# Patient Record
Sex: MALE | Race: WHITE | Employment: OTHER | URBAN - METROPOLITAN AREA
[De-identification: names, ages, dates, MRNs, and addresses within clinical notes are randomized per-mention and may not be internally consistent; named-entity substitution may affect disease eponyms.]

---

## 2017-02-27 RX ORDER — LISINOPRIL 20 MG/1
TABLET ORAL
Qty: 60 TABLET | Refills: 4 | Status: SHIPPED | OUTPATIENT
Start: 2017-02-27 | End: 2017-08-19 | Stop reason: SDUPTHER

## 2017-04-06 RX ORDER — ATORVASTATIN CALCIUM 40 MG/1
TABLET, FILM COATED ORAL
Qty: 30 TABLET | Refills: 5 | Status: SHIPPED | OUTPATIENT
Start: 2017-04-06 | End: 2017-10-21 | Stop reason: SDUPTHER

## 2017-04-06 RX ORDER — METOPROLOL TARTRATE 50 MG/1
TABLET, FILM COATED ORAL
Qty: 60 TABLET | Refills: 5 | Status: SHIPPED | OUTPATIENT
Start: 2017-04-06 | End: 2017-10-21 | Stop reason: SDUPTHER

## 2017-05-25 ENCOUNTER — TELEPHONE (OUTPATIENT)
Dept: CARDIOLOGY CLINIC | Age: 67
End: 2017-05-25

## 2017-07-11 ENCOUNTER — HOSPITAL ENCOUNTER (OUTPATIENT)
Dept: NON INVASIVE DIAGNOSTICS | Age: 67
Discharge: HOME | End: 2017-07-11
Attending: INTERNAL MEDICINE | Admitting: INTERNAL MEDICINE

## 2017-07-11 DIAGNOSIS — I35.1 NONRHEUMATIC AORTIC VALVE INSUFFICIENCY: ICD-10-CM

## 2017-07-11 LAB
LV EF: 48 %
LVEF MODALITY: NORMAL

## 2017-07-24 ENCOUNTER — OFFICE VISIT (OUTPATIENT)
Dept: CARDIOLOGY CLINIC | Age: 67
End: 2017-07-24

## 2017-07-24 VITALS
SYSTOLIC BLOOD PRESSURE: 130 MMHG | DIASTOLIC BLOOD PRESSURE: 80 MMHG | OXYGEN SATURATION: 98 % | HEART RATE: 62 BPM | HEIGHT: 69 IN | WEIGHT: 184 LBS | BODY MASS INDEX: 27.25 KG/M2

## 2017-07-24 DIAGNOSIS — E78.5 HYPERLIPIDEMIA, UNSPECIFIED HYPERLIPIDEMIA TYPE: ICD-10-CM

## 2017-07-24 DIAGNOSIS — I35.1 NONRHEUMATIC AORTIC VALVE INSUFFICIENCY: ICD-10-CM

## 2017-07-24 DIAGNOSIS — I25.10 CAD, MULTIPLE VESSEL: Primary | ICD-10-CM

## 2017-07-24 DIAGNOSIS — I10 ESSENTIAL HYPERTENSION: ICD-10-CM

## 2017-07-24 DIAGNOSIS — D68.00 VON WILLEBRAND DISEASE: ICD-10-CM

## 2017-07-24 PROCEDURE — 4040F PNEUMOC VAC/ADMIN/RCVD: CPT | Performed by: NURSE PRACTITIONER

## 2017-07-24 PROCEDURE — G8419 CALC BMI OUT NRM PARAM NOF/U: HCPCS | Performed by: NURSE PRACTITIONER

## 2017-07-24 PROCEDURE — G8427 DOCREV CUR MEDS BY ELIG CLIN: HCPCS | Performed by: NURSE PRACTITIONER

## 2017-07-24 PROCEDURE — 1123F ACP DISCUSS/DSCN MKR DOCD: CPT | Performed by: NURSE PRACTITIONER

## 2017-07-24 PROCEDURE — G8598 ASA/ANTIPLAT THER USED: HCPCS | Performed by: NURSE PRACTITIONER

## 2017-07-24 PROCEDURE — 1036F TOBACCO NON-USER: CPT | Performed by: NURSE PRACTITIONER

## 2017-07-24 PROCEDURE — 3017F COLORECTAL CA SCREEN DOC REV: CPT | Performed by: NURSE PRACTITIONER

## 2017-07-24 PROCEDURE — 99214 OFFICE O/P EST MOD 30 MIN: CPT | Performed by: NURSE PRACTITIONER

## 2017-07-27 DIAGNOSIS — I25.10 CAD, MULTIPLE VESSEL: ICD-10-CM

## 2017-07-27 DIAGNOSIS — I35.1 NONRHEUMATIC AORTIC VALVE INSUFFICIENCY: ICD-10-CM

## 2017-07-27 DIAGNOSIS — I10 ESSENTIAL HYPERTENSION: ICD-10-CM

## 2017-07-27 LAB
A/G RATIO: 1.5 (ref 1.1–2.2)
ALBUMIN SERPL-MCNC: 4 G/DL (ref 3.4–5)
ALP BLD-CCNC: 60 U/L (ref 40–129)
ALT SERPL-CCNC: 15 U/L (ref 10–40)
ANION GAP SERPL CALCULATED.3IONS-SCNC: 10 MMOL/L (ref 3–16)
AST SERPL-CCNC: 12 U/L (ref 15–37)
BASOPHILS ABSOLUTE: 0 K/UL (ref 0–0.2)
BASOPHILS RELATIVE PERCENT: 0.4 %
BILIRUB SERPL-MCNC: 0.7 MG/DL (ref 0–1)
BUN BLDV-MCNC: 14 MG/DL (ref 7–20)
CALCIUM SERPL-MCNC: 9.2 MG/DL (ref 8.3–10.6)
CHLORIDE BLD-SCNC: 105 MMOL/L (ref 99–110)
CHOLESTEROL, TOTAL: 128 MG/DL (ref 0–199)
CO2: 27 MMOL/L (ref 21–32)
CREAT SERPL-MCNC: 0.6 MG/DL (ref 0.8–1.3)
EOSINOPHILS ABSOLUTE: 0.2 K/UL (ref 0–0.6)
EOSINOPHILS RELATIVE PERCENT: 2.7 %
GFR AFRICAN AMERICAN: >60
GFR NON-AFRICAN AMERICAN: >60
GLOBULIN: 2.6 G/DL
GLUCOSE BLD-MCNC: 88 MG/DL (ref 70–99)
HCT VFR BLD CALC: 43.3 % (ref 40.5–52.5)
HDLC SERPL-MCNC: 52 MG/DL (ref 40–60)
HEMOGLOBIN: 14.9 G/DL (ref 13.5–17.5)
LDL CHOLESTEROL CALCULATED: 68 MG/DL
LYMPHOCYTES ABSOLUTE: 1.3 K/UL (ref 1–5.1)
LYMPHOCYTES RELATIVE PERCENT: 21.6 %
MCH RBC QN AUTO: 32.3 PG (ref 26–34)
MCHC RBC AUTO-ENTMCNC: 34.4 G/DL (ref 31–36)
MCV RBC AUTO: 94.1 FL (ref 80–100)
MONOCYTES ABSOLUTE: 0.5 K/UL (ref 0–1.3)
MONOCYTES RELATIVE PERCENT: 7.8 %
NEUTROPHILS ABSOLUTE: 4 K/UL (ref 1.7–7.7)
NEUTROPHILS RELATIVE PERCENT: 67.5 %
PDW BLD-RTO: 13.7 % (ref 12.4–15.4)
PLATELET # BLD: 157 K/UL (ref 135–450)
PMV BLD AUTO: 7.7 FL (ref 5–10.5)
POTASSIUM SERPL-SCNC: 4.9 MMOL/L (ref 3.5–5.1)
RBC # BLD: 4.61 M/UL (ref 4.2–5.9)
SODIUM BLD-SCNC: 142 MMOL/L (ref 136–145)
TOTAL PROTEIN: 6.6 G/DL (ref 6.4–8.2)
TRIGL SERPL-MCNC: 39 MG/DL (ref 0–150)
VLDLC SERPL CALC-MCNC: 8 MG/DL
WBC # BLD: 5.9 K/UL (ref 4–11)

## 2017-08-21 RX ORDER — LISINOPRIL 20 MG/1
TABLET ORAL
Qty: 60 TABLET | Refills: 3 | Status: SHIPPED | OUTPATIENT
Start: 2017-08-21 | End: 2018-10-11 | Stop reason: SDUPTHER

## 2017-10-23 RX ORDER — METOPROLOL TARTRATE 50 MG/1
TABLET, FILM COATED ORAL
Qty: 60 TABLET | Refills: 6 | Status: SHIPPED | OUTPATIENT
Start: 2017-10-23 | End: 2018-05-31 | Stop reason: SDUPTHER

## 2017-10-23 RX ORDER — ATORVASTATIN CALCIUM 40 MG/1
TABLET, FILM COATED ORAL
Qty: 30 TABLET | Refills: 11 | Status: SHIPPED | OUTPATIENT
Start: 2017-10-23 | End: 2018-08-16 | Stop reason: SDUPTHER

## 2017-10-25 RX ORDER — METOPROLOL TARTRATE 50 MG/1
TABLET, FILM COATED ORAL
Qty: 60 TABLET | Refills: 6 | Status: SHIPPED | OUTPATIENT
Start: 2017-10-25 | End: 2018-08-16 | Stop reason: SDUPTHER

## 2017-12-22 RX ORDER — LISINOPRIL 20 MG/1
TABLET ORAL
Qty: 60 TABLET | Refills: 1 | Status: SHIPPED | OUTPATIENT
Start: 2017-12-22 | End: 2018-02-23 | Stop reason: SDUPTHER

## 2018-02-26 RX ORDER — LISINOPRIL 20 MG/1
TABLET ORAL
Qty: 60 TABLET | Refills: 6 | Status: SHIPPED | OUTPATIENT
Start: 2018-02-26 | End: 2018-08-16 | Stop reason: SDUPTHER

## 2018-05-31 RX ORDER — METOPROLOL TARTRATE 50 MG/1
TABLET, FILM COATED ORAL
Qty: 180 TABLET | Refills: 3 | Status: SHIPPED | OUTPATIENT
Start: 2018-05-31 | End: 2019-10-17

## 2018-08-16 ENCOUNTER — TELEPHONE (OUTPATIENT)
Dept: CARDIOLOGY CLINIC | Age: 68
End: 2018-08-16

## 2018-08-16 RX ORDER — METOPROLOL TARTRATE 50 MG/1
TABLET, FILM COATED ORAL
Qty: 180 TABLET | Refills: 3 | Status: SHIPPED | OUTPATIENT
Start: 2018-08-16 | End: 2018-10-11 | Stop reason: SDUPTHER

## 2018-08-16 RX ORDER — LISINOPRIL 20 MG/1
TABLET ORAL
Qty: 180 TABLET | Refills: 3 | Status: SHIPPED | OUTPATIENT
Start: 2018-08-16 | End: 2019-08-13 | Stop reason: SDUPTHER

## 2018-08-16 RX ORDER — ATORVASTATIN CALCIUM 40 MG/1
TABLET, FILM COATED ORAL
Qty: 90 TABLET | Refills: 3 | Status: SHIPPED | OUTPATIENT
Start: 2018-08-16 | End: 2019-08-13 | Stop reason: SDUPTHER

## 2018-10-11 ENCOUNTER — OFFICE VISIT (OUTPATIENT)
Dept: CARDIOLOGY CLINIC | Age: 68
End: 2018-10-11
Payer: MEDICARE

## 2018-10-11 VITALS
BODY MASS INDEX: 27.4 KG/M2 | SYSTOLIC BLOOD PRESSURE: 134 MMHG | HEIGHT: 69 IN | HEART RATE: 56 BPM | DIASTOLIC BLOOD PRESSURE: 72 MMHG | OXYGEN SATURATION: 97 % | WEIGHT: 185 LBS

## 2018-10-11 DIAGNOSIS — I35.1 NONRHEUMATIC AORTIC VALVE INSUFFICIENCY: ICD-10-CM

## 2018-10-11 DIAGNOSIS — D68.00 VON WILLEBRAND DISEASE: ICD-10-CM

## 2018-10-11 DIAGNOSIS — I10 ESSENTIAL HYPERTENSION: ICD-10-CM

## 2018-10-11 DIAGNOSIS — E78.2 MIXED HYPERLIPIDEMIA: ICD-10-CM

## 2018-10-11 DIAGNOSIS — I25.10 CORONARY ARTERY DISEASE INVOLVING NATIVE CORONARY ARTERY OF NATIVE HEART WITHOUT ANGINA PECTORIS: Primary | ICD-10-CM

## 2018-10-11 DIAGNOSIS — Z95.1 S/P CABG (CORONARY ARTERY BYPASS GRAFT): Chronic | ICD-10-CM

## 2018-10-11 PROCEDURE — 4040F PNEUMOC VAC/ADMIN/RCVD: CPT | Performed by: INTERNAL MEDICINE

## 2018-10-11 PROCEDURE — 93000 ELECTROCARDIOGRAM COMPLETE: CPT | Performed by: INTERNAL MEDICINE

## 2018-10-11 PROCEDURE — 3017F COLORECTAL CA SCREEN DOC REV: CPT | Performed by: INTERNAL MEDICINE

## 2018-10-11 PROCEDURE — G8484 FLU IMMUNIZE NO ADMIN: HCPCS | Performed by: INTERNAL MEDICINE

## 2018-10-11 PROCEDURE — 1101F PT FALLS ASSESS-DOCD LE1/YR: CPT | Performed by: INTERNAL MEDICINE

## 2018-10-11 PROCEDURE — 99214 OFFICE O/P EST MOD 30 MIN: CPT | Performed by: INTERNAL MEDICINE

## 2018-10-11 PROCEDURE — G8427 DOCREV CUR MEDS BY ELIG CLIN: HCPCS | Performed by: INTERNAL MEDICINE

## 2018-10-11 PROCEDURE — G8419 CALC BMI OUT NRM PARAM NOF/U: HCPCS | Performed by: INTERNAL MEDICINE

## 2018-10-11 PROCEDURE — 1036F TOBACCO NON-USER: CPT | Performed by: INTERNAL MEDICINE

## 2018-10-11 PROCEDURE — 1123F ACP DISCUSS/DSCN MKR DOCD: CPT | Performed by: INTERNAL MEDICINE

## 2018-10-11 PROCEDURE — G8598 ASA/ANTIPLAT THER USED: HCPCS | Performed by: INTERNAL MEDICINE

## 2018-10-11 NOTE — PROGRESS NOTES
B-blocker. 2) Essential hypertension. Goal BP <130/80. Reports intolerance to HCTZ. Consider CCB if uncontrolled at follow-up. 3) Hyperlipidemia. Continue high potency statin. 4) Aortic insufficiency. Continue to monitor clinically and with intermittent echo. No murmur heard. 5) Von Willebrand disease. Tolerating ASA 81mg daily. 6) CVA. Continue ASA and statin. Follow up in 1 year. Thank you very much for allowing me to participate in the care of your patient. Please do not hesitate to contact me if you have any questions. Sincerely,  Toby Burkett. Daniela Montilla, [address] [place] [address]  Ph: [phone]  Fax: [phone]    This note was scribed in the presence of Dr Daniela Montilla MD by Lady Desai RN. Physician Attestation: The scribes documentation has been prepared under my direction and personally reviewed by me in its entirety. I confirm that the note above accurately reflects all work, treatment, procedures, and medical decision making performed by me.

## 2018-10-11 NOTE — PATIENT INSTRUCTIONS
get worse or are not getting better within 5 minutes, call 911 right away. Stay on the phone. The emergency  will give you further instructions.     · Be sure to tell your doctor about any angina symptoms that you have had, even if they went away.     · Do not take any over-the-counter medicines, vitamins, or herbal products without talking to your doctor first.   Zari Jason your doctor if a cardiac rehab program is right for you. Cardiac rehab can help you make lifestyle changes. In cardiac rehab, a team of health professionals provides education and support to help you make new, healthy habits.    · Do not smoke. Avoid secondhand smoke too. Smoking can increase your risk of a heart attack or stroke. If you need help quitting, talk to your doctor about stop-smoking programs and medicines. These can increase your chances of quitting for good.     · Eat a heart-healthy diet that is high in fiber and low in saturated fat and sodium. ¨ Learn what a serving is. A \"serving\" and a \"portion\" are not always the same thing. Make sure that you are not eating larger portions than recommended. For example, a serving of pasta is ½ cup. A serving size of meat is 2 to 3 ounces; a 3-ounce serving is about the size of a deck of cards. ¨ Eat a variety of grain products every day. Include whole-grain foods such as oats, whole wheat bread, and brown rice. ¨ Eat fish, skinless poultry, lean meats, and soy products such as tofu instead of high-fat meats. Cut out all visible fat when you prepare meat. Eat at least 2 servings of fish a week. ¨ Eat a variety of fruit and vegetables every day. They have lots of nutrients that help protect against heart disease, and they have little-if any-fat. Keep carrots, celery, and other veggies handy for snacks. Buy fruit that is in season and store it where you can see it so that you will be tempted to eat it.  Cook dishes that have a lot of veggies in them, such as stir-fried dishes

## 2019-05-09 ENCOUNTER — OFFICE VISIT (OUTPATIENT)
Dept: INTERNAL MEDICINE CLINIC | Age: 69
End: 2019-05-09
Payer: MEDICARE

## 2019-05-09 VITALS
WEIGHT: 188 LBS | DIASTOLIC BLOOD PRESSURE: 90 MMHG | SYSTOLIC BLOOD PRESSURE: 162 MMHG | HEART RATE: 60 BPM | BODY MASS INDEX: 27.76 KG/M2

## 2019-05-09 DIAGNOSIS — H90.3 SENSORINEURAL HEARING LOSS (SNHL) OF BOTH EARS: Primary | ICD-10-CM

## 2019-05-09 PROCEDURE — 1036F TOBACCO NON-USER: CPT | Performed by: INTERNAL MEDICINE

## 2019-05-09 PROCEDURE — 4040F PNEUMOC VAC/ADMIN/RCVD: CPT | Performed by: INTERNAL MEDICINE

## 2019-05-09 PROCEDURE — 99213 OFFICE O/P EST LOW 20 MIN: CPT | Performed by: INTERNAL MEDICINE

## 2019-05-09 PROCEDURE — G8598 ASA/ANTIPLAT THER USED: HCPCS | Performed by: INTERNAL MEDICINE

## 2019-05-09 PROCEDURE — G8419 CALC BMI OUT NRM PARAM NOF/U: HCPCS | Performed by: INTERNAL MEDICINE

## 2019-05-09 PROCEDURE — 3017F COLORECTAL CA SCREEN DOC REV: CPT | Performed by: INTERNAL MEDICINE

## 2019-05-09 PROCEDURE — G8427 DOCREV CUR MEDS BY ELIG CLIN: HCPCS | Performed by: INTERNAL MEDICINE

## 2019-05-09 PROCEDURE — 1123F ACP DISCUSS/DSCN MKR DOCD: CPT | Performed by: INTERNAL MEDICINE
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
## 2019-05-09 NOTE — PATIENT INSTRUCTIONS
monitor. · Use pagers, fax machines, and email if it is hard for you to communicate by telephone. · Try to learn a listening technique called speech-reading. It is not lip-reading. You pay attention to people's gestures, expressions, posture, and tone of voice. These clues can help you understand what a person is saying. Face the person you are talking to, and have him or her face you. Make sure the lighting is good. You need to see the other person's face clearly. · Think about counseling if you need help to adjust to your hearing loss. When should you call for help? Watch closely for changes in your health, and be sure to contact your doctor if:    · You think your hearing is getting worse.     · You have new symptoms, such as dizziness or nausea. Where can you learn more? Go to https://[email or web address].[email or web address] org and sign in to your [employer] account. Enter R084 in the [employer] box to learn more about \"Hearing Loss: Care Instructions. \"     If you do not have an account, please click on the \"Sign Up Now\" link. Current as of: October 21, 2018  Content Version: 12.0  © 8216-6245 Healthwise, Incorporated. Care instructions adapted under license by [facility] ([facility]). If you have questions about a medical condition or this instruction, always ask your healthcare professional. [address] any warranty or liability for your use of this information.

## 2019-05-10 ENCOUNTER — TELEPHONE (OUTPATIENT)
Dept: INTERNAL MEDICINE CLINIC | Age: 69
End: 2019-05-10

## 2019-05-10 NOTE — TELEPHONE ENCOUNTER
Carolyn with Hearing and [address] called on doctor line because she faxed over a referral form to us yesterday for Dr. Irma Perez to fill out for patient's appt on Monday 5/13 at 9:00. I explained that Dr. Irma Perez is out but that I would send a message to [place] to see what can be done so patient does not have to re-schedule his diagnostic hearing evaluation scheduled for Monday. Please call Carolyn at number provided, ext. [phone] to discuss options. Thanks.

## 2019-05-10 NOTE — TELEPHONE ENCOUNTER
Spoke with Mariel at [facility] and [facility].   Ok to fax form back on Monday afternoon

## 2019-06-10 ENCOUNTER — OFFICE VISIT (OUTPATIENT)
Dept: INTERNAL MEDICINE CLINIC | Age: 69
End: 2019-06-10
Payer: MEDICARE

## 2019-06-10 VITALS
DIASTOLIC BLOOD PRESSURE: 80 MMHG | WEIGHT: 186 LBS | RESPIRATION RATE: 12 BRPM | SYSTOLIC BLOOD PRESSURE: 160 MMHG | BODY MASS INDEX: 27.55 KG/M2 | HEART RATE: 88 BPM | HEIGHT: 69 IN

## 2019-06-10 DIAGNOSIS — Z23 NEED FOR SHINGLES VACCINE: ICD-10-CM

## 2019-06-10 DIAGNOSIS — Z00.00 ROUTINE GENERAL MEDICAL EXAMINATION AT A HEALTH CARE FACILITY: Primary | ICD-10-CM

## 2019-06-10 DIAGNOSIS — Z11.59 NEED FOR HEPATITIS C SCREENING TEST: ICD-10-CM

## 2019-06-10 DIAGNOSIS — I10 BENIGN ESSENTIAL HTN: ICD-10-CM

## 2019-06-10 DIAGNOSIS — D68.00 VON WILLEBRAND DISEASE: Chronic | ICD-10-CM

## 2019-06-10 DIAGNOSIS — D22.9 MULTIPLE NEVI: ICD-10-CM

## 2019-06-10 DIAGNOSIS — Z23 NEED FOR PROPHYLACTIC VACCINATION AGAINST STREPTOCOCCUS PNEUMONIAE (PNEUMOCOCCUS): ICD-10-CM

## 2019-06-10 DIAGNOSIS — H90.3 SENSORINEURAL HEARING LOSS (SNHL) OF BOTH EARS: ICD-10-CM

## 2019-06-10 DIAGNOSIS — Z12.11 SCREEN FOR COLON CANCER: ICD-10-CM

## 2019-06-10 DIAGNOSIS — I25.10 CORONARY ARTERY DISEASE INVOLVING NATIVE CORONARY ARTERY OF NATIVE HEART WITHOUT ANGINA PECTORIS: ICD-10-CM

## 2019-06-10 DIAGNOSIS — Z12.5 SCREENING FOR PROSTATE CANCER: ICD-10-CM

## 2019-06-10 DIAGNOSIS — Z13.1 SCREENING FOR DIABETES MELLITUS: ICD-10-CM

## 2019-06-10 DIAGNOSIS — Z23 NEED FOR TDAP VACCINATION: ICD-10-CM

## 2019-06-10 PROCEDURE — G0009 ADMIN PNEUMOCOCCAL VACCINE: HCPCS | Performed by: INTERNAL MEDICINE

## 2019-06-10 PROCEDURE — 4040F PNEUMOC VAC/ADMIN/RCVD: CPT | Performed by: INTERNAL MEDICINE

## 2019-06-10 PROCEDURE — G0438 PPPS, INITIAL VISIT: HCPCS | Performed by: INTERNAL MEDICINE

## 2019-06-10 PROCEDURE — 90670 PCV13 VACCINE IM: CPT | Performed by: INTERNAL MEDICINE

## 2019-06-10 PROCEDURE — 3017F COLORECTAL CA SCREEN DOC REV: CPT | Performed by: INTERNAL MEDICINE

## 2019-06-10 PROCEDURE — G8598 ASA/ANTIPLAT THER USED: HCPCS | Performed by: INTERNAL MEDICINE

## 2019-06-10 PROCEDURE — 1123F ACP DISCUSS/DSCN MKR DOCD: CPT | Performed by: INTERNAL MEDICINE

## 2019-06-10 ASSESSMENT — LIFESTYLE VARIABLES
HOW MANY STANDARD DRINKS CONTAINING ALCOHOL DO YOU HAVE ON A TYPICAL DAY: 0
AUDIT-C TOTAL SCORE: 3
HOW OFTEN DO YOU HAVE SIX OR MORE DRINKS ON ONE OCCASION: 0
HOW OFTEN DURING THE LAST YEAR HAVE YOU NEEDED AN ALCOHOLIC DRINK FIRST THING IN THE MORNING TO GET YOURSELF GOING AFTER A NIGHT OF HEAVY DRINKING: 0
HAS A RELATIVE, FRIEND, DOCTOR, OR ANOTHER HEALTH PROFESSIONAL EXPRESSED CONCERN ABOUT YOUR DRINKING OR SUGGESTED YOU CUT DOWN: 0
HOW OFTEN DURING THE LAST YEAR HAVE YOU HAD A FEELING OF GUILT OR REMORSE AFTER DRINKING: 0
HOW OFTEN DURING THE LAST YEAR HAVE YOU BEEN UNABLE TO REMEMBER WHAT HAPPENED THE NIGHT BEFORE BECAUSE YOU HAD BEEN DRINKING: 0
HAVE YOU OR SOMEONE ELSE BEEN INJURED AS A RESULT OF YOUR DRINKING: 0
HOW OFTEN DO YOU HAVE A DRINK CONTAINING ALCOHOL: 3
HOW OFTEN DURING THE LAST YEAR HAVE YOU FOUND THAT YOU WERE NOT ABLE TO STOP DRINKING ONCE YOU HAD STARTED: 0
AUDIT TOTAL SCORE: 3
HOW OFTEN DURING THE LAST YEAR HAVE YOU FAILED TO DO WHAT WAS NORMALLY EXPECTED FROM YOU BECAUSE OF DRINKING: 0

## 2019-06-10 ASSESSMENT — ANXIETY QUESTIONNAIRES: GAD7 TOTAL SCORE: 1

## 2019-06-10 ASSESSMENT — PATIENT HEALTH QUESTIONNAIRE - PHQ9
SUM OF ALL RESPONSES TO PHQ QUESTIONS 1-9: 2
SUM OF ALL RESPONSES TO PHQ QUESTIONS 1-9: 2
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
## 2019-06-10 NOTE — PATIENT INSTRUCTIONS
Advance Directives: Care Instructions  Your Care Instructions  An advance directive is a legal way to state your wishes at the end of your life. It tells your family and your doctor what to do if you can no longer say what you want. There are two main types of advance directives. You can change them any time that your wishes change. · A living will tells your family and your doctor your wishes about life support and other treatment. · A durable power of  for health care lets you name a person to make treatment decisions for you when you can't speak for yourself. This person is called a health care agent. If you do not have an advance directive, decisions about your medical care may be made by a doctor or a  who doesn't know you. It may help to think of an advance directive as a gift to the people who care for you. If you have one, they won't have to make tough decisions by themselves. Follow-up care is a key part of your treatment and safety. Be sure to make and go to all appointments, and call your doctor if you are having problems. It's also a good idea to know your test results and keep a list of the medicines you take. How can you care for yourself at home? · Discuss your wishes with your loved ones and your doctor. This way, there are no surprises. · Many states have a unique form. Or you might use a universal form that has been approved by many states. This kind of form can sometimes be completed and stored online. Your electronic copy will then be available wherever you have a connection to the Internet. In most cases, doctors will respect your wishes even if you have a form from a different state. · You don't need a  to do an advance directive. But you may want to get legal advice. · Think about these questions when you prepare an advance directive:  ? Who do you want to make decisions about your medical care if you are not able to?  Many people choose a family member or close friend. ? Do you know enough about life support methods that might be used? If not, talk to your doctor so you understand. ? What are you most afraid of that might happen? You might be afraid of having pain, losing your independence, or being kept alive by machines. ? Where would you prefer to die? Choices include your home, a hospital, or a nursing home. ? Would you like to have information about hospice care to support you and your family? ? Do you want to donate organs when you die? ? Do you want certain Jehovah's witness practices performed before you die? If so, put your wishes in the advance directive. · Read your advance directive every year, and make changes as needed. When should you call for help? Be sure to contact your doctor if you have any questions. Where can you learn more? Go to [email or web address] [email or web address] org and sign in to your [employer] account. Enter R264 in the Comviva box to learn more about \"Advance Directives: Care Instructions. \"     If you do not have an account, please click on the \"Sign Up Now\" link. Current as of: April 18, 2018  Content Version: 12.0  © 0986-4034 Healthwise, [employer]. Care instructions adapted under license by [facility] ([facility]). If you have questions about a medical condition or this instruction, always ask your healthcare professional. [address] any warranty or liability for your use of this information. Learning About Durable Power of  for Health Care  What is a durable power of  for health care? A durable power of  for health care is one type of the legal forms called advance directives. It lets you decide who you want to make treatment decisions for you if you cannot speak or decide for yourself. The person you choose is called your health care agent. Another type of advance directive is a living will.  It lets you write down what kinds of treatment or life support you want or do not want. What should you think about when choosing a health care agent? Choose your health care agent carefully. This person may or may not be a family member. Talk to the person before you make your final decision. Make sure he or she is comfortable with this responsibility. It's a good idea to choose someone who:  · Is at least 25years old. · Knows you well and understands what makes life meaningful for you. · Understands your Rastafari and moral values. · Will do what you want, not what he or she wants. · Will be able to make difficult choices at a stressful time. · Will be able to refuse or stop treatment, if that is what you would want, even if you could die. · Will be firm and confident with health professionals if needed. · Will ask questions to get necessary information. · Lives near you or agrees to travel to you if needed. Your family may help you make medical decisions while you can still be part of that process. But it is important to choose one person to be your health care agent in case you are not able to make decisions for yourself. If you don't fill out the legal form and name a health care agent, the decisions your family can make may be limited. Who will make decisions for you if you do not have a health care agent? If you don't have a health care agent or a living will, your family members may disagree about your medical care. And then some medical professionals who may not know you as well might have to make decisions for you. In some cases, a  makes the decisions. When you name a health care agent, it is very clear who has the power to make health decisions for you. How do you name a health care agent? You name your health care agent on a legal form. It is usually called a durable power of  for health care. Ask your hospital, state bar association, or office on aging where to find these forms.   You must sign the form to make it legal. Some states require you to get the form notarized. This means that a person called a  watches you sign the form and then he or she signs the form. Some states also require that two or more witnesses sign the form. Be sure to tell your family members and doctors who your health care agent is. Keep your forms in a safe place. But make sure that your loved ones know where the forms are. This could be in your desk where you keep other important papers. Make sure your doctor has a copy of your forms. Where can you learn more? Go to [email or web address] org and sign in to your [facility] account. Enter [id number] in the UsabilityTools.com box to learn more about \"Learning About Durable Power of  for Health Care. \"     If you do not have an account, please click on the \"Sign Up Now\" link. Current as of: April 18, 2018  Content Version: 12.0  © 4568-0038 [employer]. Care instructions adapted under license by [facility] ([facility]). If you have questions about a medical condition or this instruction, always ask your healthcare professional. [place] [address] any warranty or liability for your use of this information. Learning About Living Rob Moore  What is a living will? A living will is a legal form you use to write down the kind of care you want at the end of your life. It is used by the health professionals who will treat you if you aren't able to decide for yourself. If you put your wishes in writing, your loved ones and others will know what kind of care you want. They won't need to guess. This can ease your mind and be helpful to others. A living will is not the same as an estate or property will. An estate will explains what you want to happen with your money and property after you die. Is a living will a legal document? A living will is a legal document. Each state has its own laws about living snyder.  If you move to another state, make sure that your living will is legal in the state where you now live. Or you might use a universal form that has been approved by many states. This kind of form can sometimes be completed and stored online. Your electronic copy will then be available wherever you have a connection to the Internet. In most cases, doctors will respect your wishes even if you have a form from a different state. · You don't need an  to complete a living will. But legal advice can be helpful if your state's laws are unclear, your health history is complicated, or your family can't agree on what should be in your living will. · You can change your living will at any time. Some people find that their wishes about end-of-life care change as their health changes. · In addition to making a living will, think about completing a medical power of  form. This form lets you name the person you want to make end-of-life treatment decisions for you (your \"health care agent\") if you're not able to. Many hospitals and nursing homes will give you the forms you need to complete a living will and a medical power of . · Your living will is used only if you can't make or communicate decisions for yourself anymore. If you become able to make decisions again, you can accept or refuse any treatment, no matter what you wrote in your living will. · Your state may offer an online registry. This is a place where you can store your living will online so the doctors and nurses who need to treat you can find it right away. What should you think about when creating a living will? Talk about your end-of-life wishes with your family members and your doctor. Let them know what you want. That way the people making decisions for you won't be surprised by your choices. Think about these questions as you make your living will:  · Do you know enough about life support methods that might be used?  If not, talk to your doctor so you know what might be done if you can't breathe on your own, your heart stops, or you're unable to swallow. · What things would you still want to be able to do after you receive life-support methods? Would you want to be able to walk? To speak? To eat on your own? To live without the help of machines? · If you have a choice, where do you want to be cared for? In your home? At a hospital or nursing home? · Do you want certain Presybeterian practices performed if you become very ill? · If you have a choice at the end of your life, where would you prefer to die? At home? In a hospital or nursing home? Somewhere else? · Would you prefer to be buried or cremated? · Do you want your organs to be donated after you die? What should you do with your living will? · Make sure that your family members and your health care agent have copies of your living will. · Give your doctor a copy of your living will to keep in your medical record. If you have more than one doctor, make sure that each one has a copy. · You may want to put a copy of your living will where it can be easily found. Where can you learn more? Go to [email or web address]://ConstructpeWorkable.[email or web address] org and sign in to your [place] account. Enter E562 in the LUVHAN box to learn more about \"Learning About Living [place]. \"     If you do not have an account, please click on the \"Sign Up Now\" link. Current as of: April 18, 2018  Content Version: 12.0  © 1004-3437 Healthwise, Incorporated. Care instructions adapted under license by [facility] ([facility]). If you have questions about a medical condition or this instruction, always ask your healthcare professional. [place] [address] any warranty or liability for your use of this information. Personalized Preventive Plan for Imelda Thompson - 6/10/2019  Medicare offers a range of preventive health benefits.  Some of the tests and screenings are paid in full while other may be subject to a deductible, co-insurance, and/or copay.    Some of these benefits include a comprehensive review of your medical history including lifestyle, illnesses that may run in your family, and various assessments and screenings as appropriate. After reviewing your medical record and screening and assessments performed today your provider may have ordered immunizations, labs, imaging, and/or referrals for you. A list of these orders (if applicable) as well as your Preventive Care list are included within your After Visit Summary for your review. Other Preventive Recommendations:    · A preventive eye exam performed by an eye specialist is recommended every 1-2 years to screen for glaucoma; cataracts, macular degeneration, and other eye disorders. · A preventive dental visit is recommended every 6 months. · Try to get at least 150 minutes of exercise per week or 10,000 steps per day on a pedometer . · Order or download the FREE \"Exercise & Physical Activity: Your Everyday Guide\" from [facility] [employer] on Aging. Call 1-814.144.8075 or search [facility] [employer] online. · You need 1750-6172 mg of calcium and 2699-7297 IU of vitamin D per day. It is possible to meet your calcium requirement with diet alone, but a vitamin D supplement is usually necessary to meet this goal.  · When exposed to the sun, use a sunscreen that protects against both UVA and UVB radiation with an SPF of 30 or greater. Reapply every 2 to 3 hours or after sweating, drying off with a towel, or swimming. · Always wear a seat belt when traveling in a car. Always wear a helmet when riding a bicycle or motorcycle. Patient Education        Preventing Falls: Care Instructions  Your Care Instructions    Getting around your home safely can be a challenge if you have injuries or health problems that make it easy for you to fall. Loose rugs and furniture in walkways are among the dangers for many older people who have problems walking or who have poor eyesight.  People who have conditions such as arthritis, osteoporosis, or dementia also have to be careful not to fall. You can make your home safer with a few simple measures. Follow-up care is a key part of your treatment and safety. Be sure to make and go to all appointments, and call your doctor if you are having problems. It's also a good idea to know your test results and keep a list of the medicines you take. How can you care for yourself at home? Taking care of yourself  You may get dizzy if you do not drink enough water. To prevent dehydration, drink plenty of fluids, enough so that your urine is light yellow or clear like water. Choose water and other caffeine-free clear liquids. If you have kidney, heart, or liver disease and have to limit fluids, talk with your doctor before you increase the amount of fluids you drink. Exercise regularly to improve your strength, muscle tone, and balance. Walk if you can. Swimming may be a good choice if you cannot walk easily. Have your vision and hearing checked each year or any time you notice a change. If you have trouble seeing and hearing, you might not be able to avoid objects and could lose your balance. Know the side effects of the medicines you take. Ask your doctor or pharmacist whether the medicines you take can affect your balance. Sleeping pills or sedatives can affect your balance. Limit the amount of alcohol you drink. Alcohol can impair your balance and other senses. Ask your doctor whether calluses or corns on your feet need to be removed. If you wear loose-fitting shoes because of calluses or corns, you can lose your balance and fall. Talk to your doctor if you have numbness in your feet. Preventing falls at home  Remove raised doorway thresholds, throw rugs, and clutter. Repair loose carpet or raised areas in the floor. Move furniture and electrical cords to keep them out of walking paths.   Use nonskid floor wax, and wipe up spills right away, especially on ceramic tile floors. If you use a walker or cane, put rubber tips on it. If you use crutches, clean the bottoms of them regularly with an abrasive pad, such as steel wool. Keep your house well lit, especially stairways, porches, and outside walkways. Use night-lights in areas such as hallways and bathrooms. Add extra light switches or use remote switches (such as switches that go on or off when you clap your hands) to make it easier to turn lights on if you have to get up during the night. Install sturdy handrails on stairways. Move items in your cabinets so that the things you use a lot are on the lower shelves (about waist level). Keep a cordless phone and a flashlight with new batteries by your bed. If possible, put a phone in each of the main rooms of your house, or carry a cell phone in case you fall and cannot reach a phone. Or, you can wear a device around your neck or wrist. You push a button that sends a signal for help. Wear low-heeled shoes that fit well and give your feet good support. Use footwear with nonskid soles. Check the heels and soles of your shoes for wear. Repair or replace worn heels or soles. Do not wear socks without shoes on wood floors. Walk on the grass when the sidewalks are slippery. If you live in an area that gets snow and ice in the winter, sprinkle salt on slippery steps and sidewalks. Preventing falls in the bath  Install grab bars and nonskid mats inside and outside your shower or tub and near the toilet and sinks. Use shower chairs and bath benches. Use a hand-held shower head that will allow you to sit while showering. Get into a tub or shower by putting the weaker leg in first. Get out of a tub or shower with your strong side first.  Repair loose toilet seats and consider installing a raised toilet seat to make getting on and off the toilet easier. Keep your bathroom door unlocked while you are in the shower. Where can you learn more?   Go to effective as it used to be, because some strains of the disease have become resistant to these drugs. This makes prevention of the disease through vaccination even more important. PCV13 vaccine  Pneumococcal conjugate vaccine (called PCV13) protects against 13 types of pneumococcal bacteria. PCV13 is routinely given to children at 2, 4, 6, and 1515 months of age. It is also recommended for children and adults 3to 59years of age with certain health conditions, and for all adults 72years of age and older. Your doctor can give you details. Some people should not get this vaccine  Anyone who has ever had a life-threatening allergic reaction to a dose of this vaccine, to an earlier pneumococcal vaccine called PCV7, or to any vaccine containing diphtheria toxoid (for example, DTaP), should not get PCV13. Anyone with a severe allergy to any component of PCV13 should not get the vaccine. Tell your doctor if the person being vaccinated has any severe allergies. If the person scheduled for vaccination is not feeling well, your healthcare provider might decide to reschedule the shot on another day. Risks of a vaccine reaction  With any medicine, including vaccines, there is a chance of reactions. These are usually mild and go away on their own, but serious reactions are also possible. Problems reported following PCV13 varied by age and dose in the series. The most common problems reported among children were:  About half became drowsy after the shot, had a temporary loss of appetite, or had redness or tenderness where the shot was given. About 1 out of 3 had swelling where the shot was given. About 1 out of 3 had a mild fever, and about 1 in 20 had a fever over 102.2°F.  Up to about 8 out of 10 became fussy or irritable. Adults have reported pain, redness, and swelling where the shot was given; also mild fever, fatigue, headache, chills, or muscle pain.   Young children who get PCV13 along with inactivated flu (VICP) is a federal program that was created to compensate people who may have been injured by certain vaccines. Persons who believe they may have been injured by a vaccine can learn about the program and about filing a claim by calling 5-406.633.9020 or visiting the [employer] website at www.[facility].gov/vaccinecompensation. There is a time limit to file a claim for compensation. How can I learn more? Ask your healthcare provider. He or she can give you the vaccine package insert or suggest other sources of information. Call your local or state health department. Contact the Centers for Disease Control and Prevention (CDC): Call 2-198.833.9738 (1-800-CDC-INFO) or  Visit CDC's website at www.cdc.gov/vaccines  Vaccine Information Statement  PCV13 Vaccine  11/5/2015  42 RADHA Weiss [id number]  Department of Health and Human Services  Centers for Disease Control and Prevention  Many Vaccine Information Statements are available in Djib[place] and other languages. See www.immunize.org/vis. Muchas hojas de información sobre vacunas están disponibles en español y en otros idiomas. Visite www.immunize.org/vis. Care instructions adapted under license by [facility] ([facility]). If you have questions about a medical condition or this instruction, always ask your healthcare professional. [place] [address] any warranty or liability for your use of this information. Patient Education        Recombinant Zoster (Shingles) Vaccine, RZV: What you need to know  Why get vaccinated? Shingles (also called herpes zoster, or just zoster) is a painful skin rash, often with blisters. Shingles is caused by the varicella zoster virus, the same virus that causes chickenpox. After you have chickenpox, the virus stays in your body and can cause shingles later in life. You can't catch shingles from another person. However, a person who has never had chickenpox (or chickenpox vaccine) could get chickenpox from someone with shingles.   A shingles rash usually appears on one side of the face or body and heals within 2 to 4 weeks. Its main symptom is pain, which can be severe. Other symptoms can include fever, headache, chills, and upset stomach. Very rarely, a shingles infection can lead to pneumonia, hearing problems, blindness, brain inflammation (encephalitis), or death. For about 1 person in 5, severe pain can continue even long after the rash has cleared up. This long-lasting pain is called post-herpetic neuralgia (PHN). Shingles is far more common in people 48years of age and older than in younger people, and the risk increases with age. It is also more common in people whose immune system is weakened because of a disease such as cancer, or by drugs such as steroids or chemotherapy. At least 1 million people a year in the [facility] get shingles. Shingles vaccine (recombinant)  Recombinant shingles vaccine was approved by FDA in 2017 for the prevention of shingles. In clinical trials, it was more than 90% effective in preventing shingles. It can also reduce the likelihood of PHN. Two doses, 2 to 6 months apart, are recommended for adults 48 and older. This vaccine is also recommended for people who have already gotten the live shingles vaccine (Zostavax). There is no live virus in this vaccine. Some people should not get this vaccine  Tell your vaccine provider if you:  Have any severe, life-threatening allergies. A person who has ever had a life-threatening allergic reaction after a dose of recombinant shingles vaccine, or has a severe allergy to any component of this vaccine, may be advised not to be vaccinated. Ask your health care provider if you want information about vaccine components. Are pregnant or breastfeeding. There is not much information about use of recombinant shingles vaccine in pregnant or nursing women. Your healthcare provider might recommend delaying vaccination. Are not feeling well.  If you have a mild illness, such as a cold, you can probably get the vaccine today. If you are moderately or severely ill, you should probably wait until you recover. Your doctor can advise you. Risks of a vaccine reaction  With any medicine, including vaccines, there is a chance of reactions. After recombinant shingles vaccination, a person might experience:  Pain, redness, soreness, or swelling at the site of the injection  Headache, muscle aches, fever, shivering, fatigue  In clinical trials, most people got a sore arm with mild or moderate pain after vaccination, and some also had redness and swelling where they got the shot. Some people felt tired, had muscle pain, a headache, shivering, fever, stomach pain, or nausea. About 1 out of 6 people who got recombinant zoster vaccine experienced side effects that prevented them from doing regular activities. Symptoms went away on their own in about 2 to 3 days. Side effects were more common in younger people. You should still get the second dose of recombinant zoster vaccine even if you had one of these reactions after the first dose. Other things that could happen after this vaccine:  People sometimes faint after medical procedures, including vaccination. Sitting or lying down for about 15 minutes can help prevent fainting and injuries caused by a fall. Tell your provider if you feel dizzy or have vision changes or ringing in the ears. Some people get shoulder pain that can be more severe and longer-lasting than routine soreness that can follow injections. This happens very rarely. Any medication can cause a severe allergic reaction. Such reactions to a vaccine are estimated at about 1 in a million doses, and would happen within a few minutes to a few hours after the vaccination. As with any medicine, there is a very remote chance of a vaccine causing a serious injury or death. The safety of vaccines is always being monitored.  For more information, visit: www.cdc.gov/vaccinesafety/  What if there is a serious problem? What should I look for? Look for anything that concerns you, such as signs of a severe allergic reaction, very high fever, or unusual behavior. Signs of a severe allergic reaction can include hives, swelling of the face and throat, difficulty breathing, a fast heartbeat, dizziness, and weakness. These would usually start a few minutes to a few hours after the vaccination. What should I do? If you think it is a severe allergic reaction or other emergency that can't wait, call 9-1-1 or get to the nearest hospital. Otherwise, call your health care provider. Afterward, the reaction should be reported to the Vaccine Adverse Event Reporting System (VAERS). Your doctor should file this report, or you can do it yourself through the VAERS website at www.vaers. [facility].gov, or by calling 1-895.969.8143. VAERS does not give medical advice. .  How can I learn more? Ask your health care provider. He or she can give you the vaccine package insert or suggest other sources of information. Call your local or state health department. Contact the Centers for Disease Control and Prevention (CDC): Call 8-460.629.7961 (1-800-CDC-INFO) or  Visit CDC's website at www.cdc.gov/vaccines  Vaccine Information Statement (Interim)  Recombinant Zoster Vaccine  2/12/2018  [facility] for Disease Control and Prevention  Many Vaccine Information Statements are available in [place]ive[place] and other languages. See www.immunize.org/vis. Hojas de Información Sobre Vacunas están disponibles en Español y en muchos otros idiomas. Visite Wesley.si   Care instructions adapted under license by [address] If you have questions about a medical condition or this instruction, always ask your healthcare professional. [place] [address] any warranty or liability for your use of this information.          Patient Education Tdap (Tetanus, Diphtheria, Pertussis) Vaccine: What You Need to Know  Why get vaccinated? Tetanus, diphtheria, and pertussis are very serious diseases. Tdap vaccine can protect us from these diseases. And Tdap vaccine given to pregnant women can protect  babies against pertussis. Tetanus (lockjaw) is rare in the [facility] today. It causes painful muscle tightening and stiffness, usually all over the body. It can lead to tightening of muscles in the head and neck so you can't open your mouth, swallow, or sometimes even breathe. Tetanus kills about 1 out of 10 people who are infected even after receiving the best medical care. Diphtheria is also rare in the [place] today. It can cause a thick coating to form in the back of the throat. It can lead to breathing problems, heart failure, paralysis, and death. Pertussis (whooping cough) causes severe coughing spells, which can cause difficulty breathing, vomiting, and disturbed sleep. It can also lead to weight loss, incontinence, and rib fractures. Up to 2 in 100 adolescents and 5 in 100 adults with pertussis are hospitalized or have complications, which could include pneumonia or death. These diseases are caused by bacteria. Diphtheria and pertussis are spread from person to person through secretions from coughing or sneezing. Tetanus enters the body through cuts, scratches, or wounds. Before vaccines, as many as 200,000 cases of diphtheria, 200,000 cases of pertussis, and hundreds of cases of tetanus were reported in the [place] each year. Since vaccination began, reports of cases for tetanus and diphtheria have dropped by about 99% and for pertussis by about 80%. Tdap vaccine  The Tdap vaccine can protect adolescents and adults from tetanus, diphtheria, and pertussis. One dose of Tdap is routinely given at age 6 or 15. People who did not get Tdap at that age should get it as soon as possible.   Tdap is especially important for health adults)  Redness or swelling where the shot was given (about 1 person in 5)  Mild fever of at least 100.4°F (up to about 1 in 25 adolescents or 1 in 100 adults)  Headache (about 3 or 4 people in 10)  Tiredness (about 1 person in 3 or 4)  Nausea, vomiting, diarrhea, stomachache (up to 1 in 4 adolescents or 1 in 10 adults)  Chills, sore joints (about 1 person in 10)  Body aches (about 1 person in 3 or 4)  Rash, swollen glands (uncommon)  Moderate problems following Tdap  (Interfered with activities, but did not require medical attention)  Pain where the shot was given (up to 1 in 5 or 6)  Redness or swelling where the shot was given (up to about 1 in 16 adolescents or 1 in 12 adults)  Fever over 102°F (about 1 in 100 adolescents or 1 in 250 adults)  Headache (about 1 in 7 adolescents or 1 in 10 adults)  Nausea, vomiting, diarrhea, stomachache (up to 1 to 3 people in 100)  Swelling of the entire arm where the shot was given (up to about 1 in 500)  Severe problems following Tdap  (Unable to perform usual activities; required medical attention)  Swelling, severe pain, bleeding and redness in the arm where the shot was given (rare)  Problems that could happen after any vaccine:  People sometimes faint after a medical procedure, including vaccination. Sitting or lying down for about 15 minutes can help prevent fainting, and injuries caused by a fall. Tell your doctor if you feel dizzy or have vision changes or ringing in the ears. Some people get severe pain in the shoulder and have difficulty moving the arm where a shot was given. This happens very rarely. Any medication can cause a severe allergic reaction. Such reactions from a vaccine are very rare, estimated at fewer than 1 in a million doses, and would happen within a few minutes to a few hours after the vaccination. As with any medicine, there is a very remote chance of a vaccine causing a serious injury or death.   The safety of vaccines is always being monitored. For more information, visit: www.cdc.gov/vaccinesafety. What if there is a serious problem? What should I look for? Look for anything that concerns you, such as signs of a severe allergic reaction, very high fever, or unusual behavior. Signs of a severe allergic reaction can include hives, swelling of the face and throat, difficulty breathing, a fast heartbeat, dizziness, and weakness. These would usually start a few minutes to a few hours after the vaccination. What should I do? If you think it is a severe allergic reaction or other emergency that can't wait, call 9-1-1 or get the person to the nearest hospital. Otherwise, call your doctor. Afterward, the reaction should be reported to the Vaccine Adverse Event Reporting System (VAERS). Your doctor might file this report, or you can do it yourself through the VAERS web site at www.vaers. Vigour.io.gov, or by calling 7-536.959.8692. VAERS does not give medical advice. The National Vaccine Injury Compensation Program  The National Vaccine Injury Compensation Program (VICP) is a federal program that was created to compensate people who may have been injured by certain vaccines. Persons who believe they may have been injured by a vaccine can learn about the program and about filing a claim by calling 5-971.684.3196 or visiting the [facility] website at www.[facility].gov/vaccinecompensation. There is a time limit to file a claim for compensation. How can I learn more? Ask your doctor. He or she can give you the vaccine package insert or suggest other sources of information. Call your local or state health department. Contact the Centers for Disease Control and Prevention (CDC): Call 3-196.376.5534 (1-800-CDC-INFO) or  Visit CDC's website at www.cdc.gov/vaccines  Vaccine Information Statement (Interim)  Tdap Vaccine  (2/24/15)  [address] [id number]  Department of Health and Human Services  Centers for Disease Control and Prevention  Many Vaccine Information Statements are available in [place]ian and other languages. See www.immunize.org/vis. Muchas hojas de información sobre vacunas están disponibles en español y en otros idiomas. Visite www.immunize.org/vis. Care instructions adapted under license by [facility] ([facility]). If you have questions about a medical condition or this instruction, always ask your healthcare professional. [place] [address] any warranty or liability for your use of this information. Patient Education        Moles: Care Instructions  Your Care Instructions  Moles are skin growths made up of cells that produce color (pigment). A mole can appear anywhere on the skin, alone or in groups. Most people get a few moles during their first 20 years of life. They are usually brown in color but can be blue, black, or flesh-colored. Most moles are harmless and do not cause pain or other symptoms, unless you rub them or they bump against something. You usually do not need treatment for moles. But some can turn into cancer. Talk to your doctor if a mole bleeds, itches, burns, or changes size or color. Also let your doctor know if you get a new mole. Make sure to wear sunscreen and other sun protection every day to help prevent skin cancer. Follow-up care is a key part of your treatment and safety. Be sure to make and go to all appointments, and call your doctor if you are having problems. It's also a good idea to know your test results and keep a list of the medicines you take. How can you care for yourself at home? Check all the skin on your body once a month for skin growths or other changes, such as in the color and feel of the skin.  front of a full-length mirror. Look carefully at the front and back of your body. Then look at your right and left sides with your arms raised. Bend your elbows and look carefully at your forearms, the back of your upper arms, and your palms.   Look at your feet, the bottoms of your feet, and the spaces between your toes. Use a hand mirror to look at the back of your legs, the back of your neck, and your back, rear end (buttocks), and genital area. Part the hair on your head to look at your scalp. If you see a change in a skin growth, contact your doctor. Look for:  A mole that bleeds. A fast-growing mole. A scaly or crusted growth on the skin. A sore that will not heal.  To prevent skin cancer  Always wear sunscreen on exposed skin. Make sure to use a broad-spectrum sunscreen that has a sun protection factor (SPF) of 30 or higher. Use it every day, even when it is cloudy. Wear a wide-brimmed hat and long sleeves and pants if you are going to be outdoors for very long. Avoid the sun between 10 a.m. and 4 p.m., which is the peak time for the sun's ultraviolet rays. Avoid sunburns, tanning booths, and sunlamps. Be sure to protect children from the sun. Sunburns in childhood damage the skin and increase the risk of cancer. When should you call for help? Watch closely for changes in your health, and be sure to contact your doctor if:    A mole looks different than it did before. It may have changed in size, color, shape, or the way it looks. Where can you learn more? Go to [email or web address]://NanostellarpebrittonCitycelebrityeb.Pharmaxis. org and sign in to your Maxpanda SaaS Software account. Enter V573 in the Rock Content box to learn more about \"Moles: Care Instructions. \"     If you do not have an account, please click on the \"Sign Up Now\" link. Current as of: April 17, 2018  Content Version: 12.0  © 0724-2591 Claro. Care instructions adapted under license by [facility] ([facility]). If you have questions about a medical condition or this instruction, always ask your healthcare professional. [address] any warranty or liability for your use of this information.          Patient Education        Prostate Cancer Screening: Care Instructions  Your Care Instructions    The prostate gland is an organ found just below a man's bladder. It is the size and shape of a walnut. It surrounds the tube that carries urine from the bladder out of the body through the penis. This tube is called the urethra. Prostate cancer is the abnormal growth of cells in the prostate. It is the second most common type of cancer in men. (Skin cancer is the most common.)  Most cases of prostate cancer occur in men older than 72. The disease runs in families. And it's more common in -American men. When it's found and treated early, prostate cancer may be cured. But it is not always treated. This is because prostate cancer may not shorten your life, especially if you are older and the cancer is growing slowly. Follow-up care is a key part of your treatment and safety. Be sure to make and go to all appointments, and call your doctor if you are having problems. It's also a good idea to know your test results and keep a list of the medicines you take. What are the screening tests for prostate cancer? The main screening test for prostate cancer is the prostate-specific antigen (PSA) test. This is a blood test that measures how much PSA is in your blood. A high level may mean that you have an enlargement, an infection, or cancer. Along with the PSA test, you may have a digital rectal exam. The digital (finger) rectal exam checks for anything abnormal in your prostate. To do the exam, the doctor puts a lubricated, gloved finger into your rectum. If these tests suggest cancer, you may need a prostate biopsy. How is prostate cancer diagnosed? In a biopsy, the doctor takes small tissue samples from your prostate gland. Another doctor then looks at the tissue under a microscope to see if there are cancer cells, signs of infection, or other problems. The results help diagnose prostate cancer. What are the pros and cons of screening? Neither a PSA test nor a digital rectal exam can tell you for sure that you do or do not have cancer. But they can help you decide if you need more tests, such as a prostate biopsy. Screening tests may be useful because most men with prostate cancer don't have symptoms. It can be hard to know if you have cancer until it is more advanced. And then it's harder to treat. But having a PSA test can also cause harm. The test may show high levels of PSA that aren't caused by cancer. So you could have a prostate biopsy you didn't need. Or the PSA test might be normal when there is cancer, so a cancer might not be found early. The test can also find cancers that would never have caused a problem during your lifetime. So you might have treatment that was not needed. Prostate cancer usually develops late in life and grows slowly. For many men, it does not shorten their lives. Some experts advise screening only for men who are at high risk. Talk with your doctor to see if screening is right for you. Where can you learn more? Go to https://TapCanvas.Socialize. org and sign in to your Hotlease.Com account. Enter R550 in the Elonics box to learn more about \"Prostate Cancer Screening: Care Instructions. \"     If you do not have an account, please click on the \"Sign Up Now\" link. Current as of: December 19, 2018  Content Version: 12.0  © 7152-3686 Fortisphere. Care instructions adapted under license by [facility] ([facility]). If you have questions about a medical condition or this instruction, always ask your healthcare professional. [place] [address] any warranty or liability for your use of this information. Patient Education        Colon Cancer Screening: Care Instructions  Your Care Instructions    Colorectal cancer occurs in the colon or rectum. That's the lower part of your digestive system. It is the second-leading cause of cancer deaths in the [place]. It often starts with small growths called polyps in the colon or rectum.  Polyps are usually found with screening blood test (gFOBT) every year  Sigmoidoscopy every 5 years  Colonoscopy every 10 years  If you are over age 76, you can work with your doctor to decide if screening is a good option. Talk with your doctor about when you need to be tested. And discuss which tests are right for you. Your doctor may recommend earlier or more frequent testing if you:  Have had colorectal cancer before. Have had colon polyps. Have symptoms of colorectal cancer. These include blood in your stool and changes in your bowel habits. Have a parent, brother or sister, or child with colon polyps or colorectal cancer. Have a bowel disease. This includes ulcerative colitis and Crohn's disease. Have a rare polyp syndrome that runs in families, such as familial adenomatous polyposis (FAP). Have had radiation treatments to the belly or pelvis. When should you call for help? Watch closely for changes in your health, and be sure to contact your doctor if:    You have any changes in your bowel habits.     You have any problems. Where can you learn more? Go to https://Appvance."[employer]. org and sign in to your WayConnected account. Enter [id number] [id number] [phone] in the Purple Labs box to learn more about \"Colon Cancer Screening: Care Instructions. \"     If you do not have an account, please click on the \"Sign Up Now\" link. Current as of: December 19, 2018  Content Version: 12.0  © 3667-6066 Healthwise, Incorporated. Care instructions adapted under license by [facility] ([facility]). If you have questions about a medical condition or this instruction, always ask your healthcare professional. [place] [address] any warranty or liability for your use of this information. Patient Education        Well Visit, Over 72: Care Instructions  Your Care Instructions    Physical exams can help you stay healthy. Your doctor has checked your overall health and may have suggested ways to take good care of yourself.  He or she also may have recommended tests. At home, you can help prevent illness with healthy eating, regular exercise, and other steps. Follow-up care is a key part of your treatment and safety. Be sure to make and go to all appointments, and call your doctor if you are having problems. It's also a good idea to know your test results and keep a list of the medicines you take. How can you care for yourself at home? Reach and stay at a healthy weight. This will lower your risk for many problems, such as obesity, diabetes, heart disease, and high blood pressure. Get at least 30 minutes of exercise on most days of the week. Walking is a good choice. You also may want to do other activities, such as running, swimming, cycling, or playing tennis or team sports. Do not smoke. Smoking can make health problems worse. If you need help quitting, talk to your doctor about stop-smoking programs and medicines. These can increase your chances of quitting for good. Protect your skin from too much sun. When you're outdoors from 10 a.m. to 4 p.m., stay in the shade or cover up with clothing and a hat with a wide brim. Wear sunglasses that block UV rays. Even when it's cloudy, put broad-spectrum sunscreen (SPF 30 or higher) on any exposed skin. See a dentist one or two times a year for checkups and to have your teeth cleaned. Wear a seat belt in the car. Limit alcohol to 2 drinks a day for men and 1 drink a day for women. Too much alcohol can cause health problems. Follow your doctor's advice about when to have certain tests. These tests can spot problems early. For men and women  Cholesterol. Your doctor will tell you how often to have this done based on your overall health and other things that can increase your risk for heart attack and stroke. Blood pressure. Have your blood pressure checked during a routine doctor visit.  Your doctor will tell you how often to check your blood pressure based on your age, your blood pressure results, and other factors. Diabetes. Ask your doctor whether you should have tests for diabetes. Vision. Experts recommend that you have yearly exams for glaucoma and other age-related eye problems. Hearing. Tell your doctor if you notice any change in your hearing. You can have tests to find out how well you hear. Colon cancer tests. Keep having colon cancer tests as your doctor recommends. You can have one of several types of tests. Heart attack and stroke risk. At least every 4 to 6 years, you should have your risk for heart attack and stroke assessed. Your doctor uses factors such as your age, blood pressure, cholesterol, and whether you smoke or have diabetes to show what your risk for a heart attack or stroke is over the next 10 years. Osteoporosis. Talk to your doctor about whether you should have a bone density test to find out whether you have thinning bones. Also ask your doctor about whether you should take calcium and vitamin D supplements. For women  Pap test and pelvic exam. You may no longer need a Pap test. Talk with your doctor about whether to stop or continue to have Pap tests. Breast exam and mammogram. Ask how often you should have a mammogram, which is an X-ray of your breasts. A mammogram can spot breast cancer before it can be felt and when it is easiest to treat. Thyroid disease. Talk to your doctor about whether to have your thyroid checked as part of a regular physical exam. Women have an increased chance of a thyroid problem. For men  Prostate exam. Talk to your doctor about whether you should have a blood test (called a PSA test) for prostate cancer. Experts recommend that you discuss the benefits and risks of the test with your doctor before you decide whether to have this test. Some experts say that men ages 79 and older no longer need testing. Abdominal aortic aneurysm. Ask your doctor whether you should have a test to check for an aneurysm.  You may need a test if you ever smoked or if your parent, brother, sister, or child has had an aneurysm. When should you call for help? Watch closely for changes in your health, and be sure to contact your doctor if you have any problems or symptoms that concern you. Where can you learn more? Go to https://chflaquitaeb.Corvil. org and sign in to your CardShark Poker Productst account. Enter N336 in the Walk-in box to learn more about \"Well Visit, Over 65: Care Instructions. \"     If you do not have an account, please click on the \"Sign Up Now\" link. Current as of: December 13, 2018  Content Version: 12.0  © 9813-9722 Healthwise, Incorporated. Care instructions adapted under license by [facility] ([facility]). If you have questions about a medical condition or this instruction, always ask your healthcare professional. [address] any warranty or liability for your use of this information.

## 2019-06-17 ENCOUNTER — TELEPHONE (OUTPATIENT)
Dept: INTERNAL MEDICINE CLINIC | Age: 69
End: 2019-06-17

## 2019-06-17 NOTE — TELEPHONE ENCOUNTER
Radha with [facility] requesting previous audiology results from [facility]. Please fax to [phone] if results are available. Radha can be reached at phone number provided.  She states patient is currently in office getting a hearing test.

## 2019-06-17 NOTE — TELEPHONE ENCOUNTER
Advised Radha no record of recent hearing test. Looked at last OV and in Dr Yair Mcdermott note it stated patient was not happy with last hearing eval so he wanted a second opinion is why it was ordered.

## 2019-08-15 RX ORDER — METOPROLOL TARTRATE 50 MG/1
TABLET, FILM COATED ORAL
Qty: 180 TABLET | Refills: 2 | Status: SHIPPED | OUTPATIENT
Start: 2019-08-15 | End: 2019-10-17

## 2019-08-15 RX ORDER — ATORVASTATIN CALCIUM 40 MG/1
TABLET, FILM COATED ORAL
Qty: 90 TABLET | Refills: 2 | Status: SHIPPED | OUTPATIENT
Start: 2019-08-15 | End: 2019-10-17

## 2019-08-15 RX ORDER — LISINOPRIL 20 MG/1
TABLET ORAL
Qty: 180 TABLET | Refills: 2 | Status: SHIPPED | OUTPATIENT
Start: 2019-08-15 | End: 2019-10-17

## 2019-08-15 NOTE — TELEPHONE ENCOUNTER
Please advise as this is a Dr. Pankaj Mars patient. .  Per RN, Kayley Gordon, pt also needs a followup in conjunction to RX request.    Thanks.

## 2019-10-17 ENCOUNTER — OFFICE VISIT (OUTPATIENT)
Dept: CARDIOLOGY CLINIC | Age: 69
End: 2019-10-17
Payer: MEDICARE

## 2019-10-17 VITALS
HEART RATE: 61 BPM | WEIGHT: 199 LBS | HEIGHT: 69 IN | OXYGEN SATURATION: 99 % | DIASTOLIC BLOOD PRESSURE: 80 MMHG | SYSTOLIC BLOOD PRESSURE: 172 MMHG | BODY MASS INDEX: 29.47 KG/M2

## 2019-10-17 DIAGNOSIS — I35.1 NONRHEUMATIC AORTIC VALVE INSUFFICIENCY: Primary | Chronic | ICD-10-CM

## 2019-10-17 DIAGNOSIS — Z86.73 H/O: CVA (CEREBROVASCULAR ACCIDENT): ICD-10-CM

## 2019-10-17 DIAGNOSIS — I10 ESSENTIAL HYPERTENSION: ICD-10-CM

## 2019-10-17 DIAGNOSIS — D68.00 VON WILLEBRAND DISEASE: ICD-10-CM

## 2019-10-17 DIAGNOSIS — Z95.1 S/P CABG (CORONARY ARTERY BYPASS GRAFT): ICD-10-CM

## 2019-10-17 DIAGNOSIS — I25.10 CORONARY ARTERY DISEASE INVOLVING NATIVE CORONARY ARTERY OF NATIVE HEART WITHOUT ANGINA PECTORIS: ICD-10-CM

## 2019-10-17 PROCEDURE — 4040F PNEUMOC VAC/ADMIN/RCVD: CPT | Performed by: INTERNAL MEDICINE

## 2019-10-17 PROCEDURE — 1036F TOBACCO NON-USER: CPT | Performed by: INTERNAL MEDICINE

## 2019-10-17 PROCEDURE — 1123F ACP DISCUSS/DSCN MKR DOCD: CPT | Performed by: INTERNAL MEDICINE

## 2019-10-17 PROCEDURE — G8598 ASA/ANTIPLAT THER USED: HCPCS | Performed by: INTERNAL MEDICINE

## 2019-10-17 PROCEDURE — 99214 OFFICE O/P EST MOD 30 MIN: CPT | Performed by: INTERNAL MEDICINE

## 2019-10-17 PROCEDURE — 93000 ELECTROCARDIOGRAM COMPLETE: CPT | Performed by: INTERNAL MEDICINE

## 2019-10-17 PROCEDURE — G8427 DOCREV CUR MEDS BY ELIG CLIN: HCPCS | Performed by: INTERNAL MEDICINE

## 2019-10-17 PROCEDURE — G8417 CALC BMI ABV UP PARAM F/U: HCPCS | Performed by: INTERNAL MEDICINE

## 2019-10-17 PROCEDURE — G8484 FLU IMMUNIZE NO ADMIN: HCPCS | Performed by: INTERNAL MEDICINE

## 2019-10-17 PROCEDURE — 3017F COLORECTAL CA SCREEN DOC REV: CPT | Performed by: INTERNAL MEDICINE

## 2019-10-17 RX ORDER — METOPROLOL SUCCINATE 100 MG/1
100 TABLET, EXTENDED RELEASE ORAL DAILY
Qty: 30 TABLET | Refills: 3 | Status: SHIPPED | OUTPATIENT
Start: 2019-10-17 | End: 2020-03-24

## 2019-10-17 RX ORDER — LISINOPRIL 40 MG/1
40 TABLET ORAL DAILY
Qty: 30 TABLET | Refills: 3 | Status: SHIPPED | OUTPATIENT
Start: 2019-10-17 | End: 2020-03-24

## 2019-10-17 RX ORDER — ATORVASTATIN CALCIUM 80 MG/1
80 TABLET, FILM COATED ORAL DAILY
Qty: 90 TABLET | Refills: 3 | Status: SHIPPED | OUTPATIENT
Start: 2019-10-17 | End: 2020-12-31

## 2019-10-17 RX ORDER — AMLODIPINE BESYLATE 2.5 MG/1
2.5 TABLET ORAL DAILY
Qty: 30 TABLET | Refills: 2 | Status: SHIPPED | OUTPATIENT
Start: 2019-10-17 | End: 2020-01-07 | Stop reason: DRUGHIGH

## 2019-11-01 ENCOUNTER — HOSPITAL ENCOUNTER (OUTPATIENT)
Dept: NON INVASIVE DIAGNOSTICS | Age: 69
Discharge: HOME | End: 2019-11-01
Payer: MEDICARE

## 2019-11-01 DIAGNOSIS — Z95.1 S/P CABG (CORONARY ARTERY BYPASS GRAFT): ICD-10-CM

## 2019-11-01 DIAGNOSIS — I35.1 NONRHEUMATIC AORTIC VALVE INSUFFICIENCY: Chronic | ICD-10-CM

## 2019-11-01 DIAGNOSIS — I25.10 CORONARY ARTERY DISEASE INVOLVING NATIVE CORONARY ARTERY OF NATIVE HEART WITHOUT ANGINA PECTORIS: ICD-10-CM

## 2019-11-01 LAB
LV EF: 53 %
LVEF MODALITY: NORMAL

## 2019-11-01 PROCEDURE — 93306 TTE W/DOPPLER COMPLETE: CPT

## 2019-12-05 ENCOUNTER — OFFICE VISIT (OUTPATIENT)
Dept: INTERNAL MEDICINE CLINIC | Age: 69
End: 2019-12-05
Payer: MEDICARE

## 2019-12-05 VITALS
RESPIRATION RATE: 12 BRPM | DIASTOLIC BLOOD PRESSURE: 80 MMHG | BODY MASS INDEX: 29.62 KG/M2 | HEART RATE: 66 BPM | WEIGHT: 200 LBS | HEIGHT: 69 IN | SYSTOLIC BLOOD PRESSURE: 172 MMHG

## 2019-12-05 DIAGNOSIS — D68.00 VON WILLEBRAND DISEASE: ICD-10-CM

## 2019-12-05 DIAGNOSIS — E78.5 HYPERLIPIDEMIA, UNSPECIFIED HYPERLIPIDEMIA TYPE: Chronic | ICD-10-CM

## 2019-12-05 DIAGNOSIS — I10 BENIGN ESSENTIAL HTN: Primary | ICD-10-CM

## 2019-12-05 DIAGNOSIS — I25.10 CORONARY ARTERY DISEASE INVOLVING NATIVE CORONARY ARTERY OF NATIVE HEART WITHOUT ANGINA PECTORIS: ICD-10-CM

## 2019-12-05 PROCEDURE — G8484 FLU IMMUNIZE NO ADMIN: HCPCS | Performed by: INTERNAL MEDICINE

## 2019-12-05 PROCEDURE — 99214 OFFICE O/P EST MOD 30 MIN: CPT | Performed by: INTERNAL MEDICINE

## 2019-12-05 PROCEDURE — 1036F TOBACCO NON-USER: CPT | Performed by: INTERNAL MEDICINE

## 2019-12-05 PROCEDURE — G8427 DOCREV CUR MEDS BY ELIG CLIN: HCPCS | Performed by: INTERNAL MEDICINE

## 2019-12-05 PROCEDURE — 1123F ACP DISCUSS/DSCN MKR DOCD: CPT | Performed by: INTERNAL MEDICINE

## 2019-12-05 PROCEDURE — G8598 ASA/ANTIPLAT THER USED: HCPCS | Performed by: INTERNAL MEDICINE

## 2019-12-05 PROCEDURE — G8417 CALC BMI ABV UP PARAM F/U: HCPCS | Performed by: INTERNAL MEDICINE

## 2019-12-05 PROCEDURE — 3017F COLORECTAL CA SCREEN DOC REV: CPT | Performed by: INTERNAL MEDICINE

## 2019-12-05 PROCEDURE — 4040F PNEUMOC VAC/ADMIN/RCVD: CPT | Performed by: INTERNAL MEDICINE

## 2019-12-05 RX ORDER — AMLODIPINE BESYLATE 5 MG/1
5 TABLET ORAL DAILY
Qty: 90 TABLET | Refills: 3 | Status: SHIPPED | OUTPATIENT
Start: 2019-12-05 | End: 2020-01-10 | Stop reason: DRUGHIGH

## 2019-12-06 ENCOUNTER — NURSE ONLY (OUTPATIENT)
Dept: INTERNAL MEDICINE CLINIC | Age: 69
End: 2019-12-06
Payer: MEDICARE

## 2019-12-06 DIAGNOSIS — Z12.12 SCREENING FOR MALIGNANT NEOPLASM OF THE RECTUM: Primary | ICD-10-CM

## 2019-12-06 PROCEDURE — 82274 ASSAY TEST FOR BLOOD FECAL: CPT | Performed by: INTERNAL MEDICINE

## 2019-12-09 DIAGNOSIS — R19.5 POSITIVE FIT (FECAL IMMUNOCHEMICAL TEST): Primary | ICD-10-CM

## 2019-12-09 LAB
CONTROL: NORMAL
HEMOCCULT STL QL: POSITIVE

## 2019-12-20 ENCOUNTER — TELEPHONE (OUTPATIENT)
Dept: INTERNAL MEDICINE CLINIC | Age: 69
End: 2019-12-20

## 2019-12-20 NOTE — TELEPHONE ENCOUNTER
Patient is requesting that Dorian Nelson call him back at number provided so he can ask her some additional questions about his lab results. Thanks.

## 2020-01-07 ENCOUNTER — OFFICE VISIT (OUTPATIENT)
Dept: INTERNAL MEDICINE CLINIC | Age: 70
End: 2020-01-07
Payer: MEDICARE

## 2020-01-07 VITALS
SYSTOLIC BLOOD PRESSURE: 150 MMHG | WEIGHT: 200 LBS | RESPIRATION RATE: 12 BRPM | BODY MASS INDEX: 29.53 KG/M2 | DIASTOLIC BLOOD PRESSURE: 72 MMHG | HEART RATE: 80 BPM

## 2020-01-07 PROCEDURE — 99213 OFFICE O/P EST LOW 20 MIN: CPT | Performed by: INTERNAL MEDICINE

## 2020-01-07 ASSESSMENT — PATIENT HEALTH QUESTIONNAIRE - PHQ9
1. LITTLE INTEREST OR PLEASURE IN DOING THINGS: 0
SUM OF ALL RESPONSES TO PHQ QUESTIONS 1-9: 0
SUM OF ALL RESPONSES TO PHQ9 QUESTIONS 1 & 2: 0
2. FEELING DOWN, DEPRESSED OR HOPELESS: 0
SUM OF ALL RESPONSES TO PHQ QUESTIONS 1-9: 0

## 2020-01-07 NOTE — PROGRESS NOTES
[facility]) Physicians  Internal Medicine  Patient Encounter  Treasure Sy D.O., [facility]        Chief Complaint   Patient presents with    1 Month Follow-Up       HPI: 71 y.o. male seen for a short interval follow-up from his checkup on 12/5/2019. At that visit we addressed his blood pressure which was uncontrolled. His blood pressure was measured at 140/80 and then rechecked at 160/70 and then again 172/80. His amlodipine was increased to 5 mg daily. Lab work was obtained. We reviewed the results. He denies adverse effects. He has not been checking BP at home because he has been distracted about the colonoscopy. Pharmacy machine-- 12/26/2019--150/76, 12/27/2019-- 161/85.  12/30/2019-- 151/80. He has tried to reduce added salt. He states he has adverse experience with HCTZ-- he states this was a dizziness. He denies a true allergic reaction. He denies any problems with hyponatremia or skin reaction    His LDL was 93. HDL 45  Glucose 106     We also discussed the importance of: Cancer screening. Patient did not want to do the colonoscopy. He did agree to the FIT. This test was positive. He was referred to GI for colonoscopy. He thinks the blood is from Hemorrhoids. He is nervous though. He does have Atha Lights. He may need infusion of Humate P. Past Medical History:   Diagnosis Date    Acute ischemic stroke ([facility] [address] 10/24/2014    Right    Arthritis     rt knee    CAD (coronary artery disease) 5/29/14    LAD, RCA, Diag - cariothoracic surgery    HTN (hypertension)     Hyperlipidemia     Von Willebrand disease ([facility] [address]          MEDICATIONS:  Prior to Visit Medications    Medication Sig Taking?  Authorizing Provider   amLODIPine (NORVASC) 5 MG tablet Take 1 tablet by mouth daily Yes Albert Lopez DO   metoprolol succinate (TOPROL XL) 100 MG extended release tablet Take 1 tablet by mouth daily Yes Leandro Jacobson MD   lisinopril (PRINIVIL;ZESTRIL) 40 MG tablet Take 1 tablet by mouth daily Yes Sahra Osullivan MD   aspirin 81 MG tablet Take 81 mg by mouth daily. Yes Historical Provider, MD   atorvastatin (LIPITOR) 80 MG tablet Take 1 tablet by mouth daily  Sahra Osullivan MD           Review of Systems - As per HPI        OBJECTIVE:  Vitals:    01/07/20 1025 01/07/20 1101 01/07/20 1107   BP: (!) 144/74 (!) 172/70 (!) 150/72   Pulse: 72 80    Resp: 12     Weight: 200 lb (90.7 kg)       Body mass index is 29.53 kg/m². Wt Readings from Last 3 Encounters:   01/07/20 200 lb (90.7 kg)   12/05/19 200 lb (90.7 kg)   10/17/19 199 lb (90.3 kg)     BP Readings from Last 3 Encounters:   01/07/20 (!) 150/72   12/05/19 (!) 172/80   10/17/19 (!) 172/80       GEN: NAD, A&O, Non-toxic  HEENT: NC/AT, SALLY, EOMI, Oral cavity Clear,  TM's NL, Nasal cavity clear. NECK: Supple. No thyromegaly. LYMPH: No C/SC nodes. CV: S1 S2 NL, RRR. No murmurs, clicks or rubs. PULM: CTA, symmentric air exchange  EXT: No edema    ASSESSMENT[de-identified]  Alexy Dasilva was seen today for 1 month follow-up. Diagnoses and all orders for this visit:    Benign essential HTN  --Blood pressure continues to be an issue. It would seem he is got a whitecoat component. However, his readings at the drugstore have been elevated  --Recommend he get his own home blood pressure unit  --Would like to consider adding a low-dose of hydrochlorothiazide again. This was listed as an allergy but patient states he really did not have a true allergy. He had some lightheadedness or dizziness problems while on it. This can be reexplored. --We will communicate with his cardiologist.    Librado Marshall disease [facility])  --Patient will need to discuss this with his gastroenterologist prior to the colonoscopy in case biopsies need to be done. --Patient may need to be pretreated with Humate-P          Additional Plan:  1. Return in 3 month  2.  Send in BP readings    Discussed medications with patient who voiced understanding of their use, indication and potential side effects. Pt also understands the above recommendations. All questions answered. This note was generated completely or in part utilizing Dragon dictation speech recognition software. Occasionally, words are mistranscribed and despite editing, the text may contain inaccuracies due to incorrect word recognition.   If further clarification is needed please contact the office at [phone]

## 2020-01-07 NOTE — PATIENT INSTRUCTIONS
Check BP 2-3 times daily and record. Send these readings in to the office in the next couple of weeks    You may need to re-explore Hydrochlorothiazide.

## 2020-01-09 ENCOUNTER — TELEPHONE (OUTPATIENT)
Dept: INTERNAL MEDICINE CLINIC | Age: 70
End: 2020-01-09

## 2020-01-10 RX ORDER — AMLODIPINE BESYLATE 5 MG/1
10 TABLET ORAL DAILY
Qty: 90 TABLET | Refills: 3 | Status: SHIPPED
Start: 2020-01-10 | End: 2020-02-05 | Stop reason: DRUGHIGH

## 2020-01-10 NOTE — TELEPHONE ENCOUNTER
Patient is returning Dr. Tressa Tracey call. Per Dr. Emmy Ricardo, patient instructed to increase Amlodipine to 10mg daily. Patient voiced understanding.

## 2020-01-31 ENCOUNTER — ANESTHESIA EVENT (OUTPATIENT)
Dept: ENDOSCOPY | Age: 70
End: 2020-01-31
Payer: MEDICARE

## 2020-01-31 RX ORDER — SODIUM CHLORIDE 9 MG/ML
INJECTION, SOLUTION INTRAVENOUS CONTINUOUS
Status: CANCELLED | OUTPATIENT
Start: 2020-02-03

## 2020-01-31 RX ORDER — METHYLPREDNISOLONE SODIUM SUCCINATE 125 MG/2ML
125 INJECTION, POWDER, LYOPHILIZED, FOR SOLUTION INTRAMUSCULAR; INTRAVENOUS ONCE
Status: CANCELLED | OUTPATIENT
Start: 2020-02-03

## 2020-01-31 RX ORDER — SODIUM CHLORIDE 0.9 % (FLUSH) 0.9 %
10 SYRINGE (ML) INJECTION PRN
Status: CANCELLED | OUTPATIENT
Start: 2020-02-03

## 2020-01-31 RX ORDER — EPINEPHRINE 1 MG/ML
0.3 INJECTION, SOLUTION, CONCENTRATE INTRAVENOUS PRN
Status: CANCELLED | OUTPATIENT
Start: 2020-02-03

## 2020-01-31 RX ORDER — DIPHENHYDRAMINE HYDROCHLORIDE 50 MG/ML
50 INJECTION INTRAMUSCULAR; INTRAVENOUS ONCE
Status: CANCELLED | OUTPATIENT
Start: 2020-02-03

## 2020-02-03 ENCOUNTER — HOSPITAL ENCOUNTER (OUTPATIENT)
Age: 70
Setting detail: OUTPATIENT SURGERY
Discharge: HOME | End: 2020-02-03
Attending: INTERNAL MEDICINE | Admitting: INTERNAL MEDICINE
Payer: MEDICARE

## 2020-02-03 ENCOUNTER — HOSPITAL ENCOUNTER (OUTPATIENT)
Dept: INFUSION THERAPY | Age: 70
Setting detail: INFUSION SERIES
Discharge: HOME | End: 2020-02-03
Payer: MEDICARE

## 2020-02-03 ENCOUNTER — ANESTHESIA (OUTPATIENT)
Dept: ENDOSCOPY | Age: 70
End: 2020-02-03
Payer: MEDICARE

## 2020-02-03 VITALS
OXYGEN SATURATION: 96 % | WEIGHT: 200 LBS | DIASTOLIC BLOOD PRESSURE: 70 MMHG | SYSTOLIC BLOOD PRESSURE: 122 MMHG | BODY MASS INDEX: 29.62 KG/M2 | HEART RATE: 72 BPM | HEIGHT: 69 IN | TEMPERATURE: 97.6 F | RESPIRATION RATE: 16 BRPM

## 2020-02-03 VITALS
TEMPERATURE: 98.6 F | SYSTOLIC BLOOD PRESSURE: 100 MMHG | DIASTOLIC BLOOD PRESSURE: 54 MMHG | RESPIRATION RATE: 17 BRPM | OXYGEN SATURATION: 97 %

## 2020-02-03 VITALS
TEMPERATURE: 98 F | OXYGEN SATURATION: 97 % | RESPIRATION RATE: 18 BRPM | DIASTOLIC BLOOD PRESSURE: 76 MMHG | HEART RATE: 76 BPM | SYSTOLIC BLOOD PRESSURE: 157 MMHG

## 2020-02-03 DIAGNOSIS — D68.00 VON WILLEBRAND DISEASE: Primary | ICD-10-CM

## 2020-02-03 LAB — APTT: 36.1 SEC (ref 24.2–36.2)

## 2020-02-03 PROCEDURE — 2580000003 HC RX 258

## 2020-02-03 PROCEDURE — 6360000002 HC RX W HCPCS: Performed by: NURSE ANESTHETIST, CERTIFIED REGISTERED

## 2020-02-03 PROCEDURE — 7100000001 HC PACU RECOVERY - ADDTL 15 MIN: Performed by: INTERNAL MEDICINE

## 2020-02-03 PROCEDURE — 85730 THROMBOPLASTIN TIME PARTIAL: CPT

## 2020-02-03 PROCEDURE — 6360000002 HC RX W HCPCS: Performed by: INTERNAL MEDICINE

## 2020-02-03 PROCEDURE — 85245 CLOT FACTOR VIII VW RISTOCTN: CPT

## 2020-02-03 PROCEDURE — 96365 THER/PROPH/DIAG IV INF INIT: CPT

## 2020-02-03 PROCEDURE — 7100000000 HC PACU RECOVERY - FIRST 15 MIN: Performed by: INTERNAL MEDICINE

## 2020-02-03 PROCEDURE — 88305 TISSUE EXAM BY PATHOLOGIST: CPT

## 2020-02-03 PROCEDURE — 85246 CLOT FACTOR VIII VW ANTIGEN: CPT

## 2020-02-03 PROCEDURE — 21480 CLTX TMPRMAND DISLC 1ST/SBSQ: CPT

## 2020-02-03 PROCEDURE — 7100000010 HC PHASE II RECOVERY - FIRST 15 MIN: Performed by: INTERNAL MEDICINE

## 2020-02-03 PROCEDURE — 2580000003 HC RX 258: Performed by: ANESTHESIOLOGY

## 2020-02-03 PROCEDURE — 2709999900 HC NON-CHARGEABLE SUPPLY: Performed by: INTERNAL MEDICINE

## 2020-02-03 PROCEDURE — 2580000003 HC RX 258: Performed by: INTERNAL MEDICINE

## 2020-02-03 PROCEDURE — 3609010300 HC COLONOSCOPY W/BIOPSY SINGLE/MULTIPLE

## 2020-02-03 PROCEDURE — 85240 CLOT FACTOR VIII AHG 1 STAGE: CPT

## 2020-02-03 PROCEDURE — 21480 CLTX TMPRMAND DISLC 1ST/SBSQ: CPT | Performed by: OTOLARYNGOLOGY

## 2020-02-03 PROCEDURE — 3700000001 HC ADD 15 MINUTES (ANESTHESIA): Performed by: INTERNAL MEDICINE

## 2020-02-03 PROCEDURE — 3609010600 HC COLONOSCOPY POLYPECTOMY SNARE/COLD BIOPSY: Performed by: INTERNAL MEDICINE

## 2020-02-03 PROCEDURE — 7100000011 HC PHASE II RECOVERY - ADDTL 15 MIN: Performed by: INTERNAL MEDICINE

## 2020-02-03 PROCEDURE — 3700000000 HC ANESTHESIA ATTENDED CARE: Performed by: INTERNAL MEDICINE

## 2020-02-03 RX ORDER — EPINEPHRINE 1 MG/ML
0.3 INJECTION, SOLUTION, CONCENTRATE INTRAVENOUS PRN
Status: CANCELLED | OUTPATIENT
Start: 2020-02-03

## 2020-02-03 RX ORDER — PROPOFOL 10 MG/ML
INJECTION, EMULSION INTRAVENOUS CONTINUOUS PRN
Status: DISCONTINUED | OUTPATIENT
Start: 2020-02-03 | End: 2020-02-03 | Stop reason: SDUPTHER

## 2020-02-03 RX ORDER — SODIUM CHLORIDE 0.9 % (FLUSH) 0.9 %
10 SYRINGE (ML) INJECTION PRN
Status: CANCELLED | OUTPATIENT
Start: 2020-02-03

## 2020-02-03 RX ORDER — DIPHENHYDRAMINE HYDROCHLORIDE 50 MG/ML
50 INJECTION INTRAMUSCULAR; INTRAVENOUS ONCE
Status: CANCELLED | OUTPATIENT
Start: 2020-02-03

## 2020-02-03 RX ORDER — SODIUM CHLORIDE 9 MG/ML
INJECTION, SOLUTION INTRAVENOUS CONTINUOUS
Status: CANCELLED | OUTPATIENT
Start: 2020-02-03

## 2020-02-03 RX ORDER — METHYLPREDNISOLONE SODIUM SUCCINATE 125 MG/2ML
125 INJECTION, POWDER, LYOPHILIZED, FOR SOLUTION INTRAMUSCULAR; INTRAVENOUS ONCE
Status: CANCELLED | OUTPATIENT
Start: 2020-02-03

## 2020-02-03 RX ORDER — SODIUM CHLORIDE 9 MG/ML
INJECTION, SOLUTION INTRAVENOUS CONTINUOUS
Status: DISCONTINUED | OUTPATIENT
Start: 2020-02-03 | End: 2020-02-03 | Stop reason: HOSPADM

## 2020-02-03 RX ORDER — SODIUM CHLORIDE 9 MG/ML
INJECTION, SOLUTION INTRAVENOUS
Status: COMPLETED
Start: 2020-02-03 | End: 2020-02-03

## 2020-02-03 RX ORDER — ONDANSETRON 2 MG/ML
4 INJECTION INTRAMUSCULAR; INTRAVENOUS
Status: DISCONTINUED | OUTPATIENT
Start: 2020-02-03 | End: 2020-02-03 | Stop reason: HOSPADM

## 2020-02-03 RX ORDER — SODIUM CHLORIDE 0.9 % (FLUSH) 0.9 %
10 SYRINGE (ML) INJECTION PRN
Status: DISCONTINUED | OUTPATIENT
Start: 2020-02-03 | End: 2020-02-03 | Stop reason: HOSPADM

## 2020-02-03 RX ORDER — PROPOFOL 10 MG/ML
INJECTION, EMULSION INTRAVENOUS PRN
Status: DISCONTINUED | OUTPATIENT
Start: 2020-02-03 | End: 2020-02-03 | Stop reason: SDUPTHER

## 2020-02-03 RX ORDER — SODIUM CHLORIDE 0.9 % (FLUSH) 0.9 %
10 SYRINGE (ML) INJECTION EVERY 12 HOURS SCHEDULED
Status: DISCONTINUED | OUTPATIENT
Start: 2020-02-03 | End: 2020-02-03 | Stop reason: HOSPADM

## 2020-02-03 RX ADMIN — DESMOPRESSIN ACETATE 20 MCG: 4 SOLUTION INTRAVENOUS at 09:54

## 2020-02-03 RX ADMIN — SODIUM CHLORIDE 1000 ML: 9 INJECTION, SOLUTION INTRAVENOUS at 09:50

## 2020-02-03 RX ADMIN — PROPOFOL 100 MG: 10 INJECTION, EMULSION INTRAVENOUS at 11:55

## 2020-02-03 RX ADMIN — PROPOFOL 180 MCG/KG/MIN: 10 INJECTION, EMULSION INTRAVENOUS at 11:55

## 2020-02-03 RX ADMIN — SODIUM CHLORIDE: 9 INJECTION, SOLUTION INTRAVENOUS at 11:10

## 2020-02-03 ASSESSMENT — PULMONARY FUNCTION TESTS
PIF_VALUE: 0
PIF_VALUE: 5
PIF_VALUE: 0
PIF_VALUE: 2
PIF_VALUE: 0
PIF_VALUE: 0

## 2020-02-03 ASSESSMENT — PAIN DESCRIPTION - ONSET
ONSET: ON-GOING

## 2020-02-03 ASSESSMENT — PAIN DESCRIPTION - DESCRIPTORS
DESCRIPTORS: SORE
DESCRIPTORS: ACHING
DESCRIPTORS: SORE

## 2020-02-03 ASSESSMENT — PAIN DESCRIPTION - ORIENTATION
ORIENTATION: RIGHT
ORIENTATION: RIGHT

## 2020-02-03 ASSESSMENT — PAIN DESCRIPTION - FREQUENCY
FREQUENCY: CONTINUOUS

## 2020-02-03 ASSESSMENT — PAIN DESCRIPTION - PAIN TYPE
TYPE: ACUTE PAIN

## 2020-02-03 ASSESSMENT — PAIN DESCRIPTION - PROGRESSION
CLINICAL_PROGRESSION: NOT CHANGED

## 2020-02-03 ASSESSMENT — PAIN DESCRIPTION - LOCATION
LOCATION: JAW

## 2020-02-03 ASSESSMENT — PAIN SCALES - GENERAL
PAINLEVEL_OUTOF10: 2
PAINLEVEL_OUTOF10: 2
PAINLEVEL_OUTOF10: 0
PAINLEVEL_OUTOF10: 10

## 2020-02-03 ASSESSMENT — PAIN - FUNCTIONAL ASSESSMENT: PAIN_FUNCTIONAL_ASSESSMENT: 0-10

## 2020-02-03 NOTE — PROGRESS NOTES
Alert and oriented. Agreeable to procedure. Consent signed by pt.   Electronically signed by Merrie Jeans, RN on 2/3/2020 at 11:07 AM

## 2020-02-03 NOTE — ANESTHESIA POSTPROCEDURE EVALUATION
src:Temporal, Pulse:76, Resp:19, SpO2:97 %  02/03/20 1106, BP:(!) 158/74, Temp:98.9 °F (37.2 °C), Temp src:Temporal, Pulse:90, Resp:16, SpO2:98 %     LABS:    CBC  Lab Results       Component                Value               Date/Time                  WBC                      5.9                 12/06/2019 10:28 AM        HGB                      15.6                12/06/2019 10:28 AM        HCT                      45.6                12/06/2019 10:28 AM        PLT                      177                 12/06/2019 10:28 AM   RENAL  Lab Results       Component                Value               Date/Time                  NA                       141                 12/06/2019 10:28 AM        K                        4.6                 12/06/2019 10:28 AM        CL                       102                 12/06/2019 10:28 AM        CO2                      28                  12/06/2019 10:28 AM        BUN                      16                  12/06/2019 10:28 AM        CREATININE               0.8                 12/06/2019 10:28 AM        GLUCOSE                  106 (H)             12/06/2019 10:28 AM   COAGS  Lab Results       Component                Value               Date/Time                  PROTIME                  13.2 (H)            10/28/2014 05:20 AM        INR                      1.21 (H)            10/28/2014 05:20 AM        APTT                     36.1                02/03/2020 09:35 AM     Intake & Output:  @24RVSP@    Nausea & Vomiting:  No    Level of Consciousness:  Awake    Pain Assessment:  Adequate analgesia    Anesthesia Complications:  No apparent anesthetic complications    SUMMARY      Vital signs stable  OK to discharge from Stage I post anesthesia care.   Care transferred from Anesthesiology department on discharge from perioperative area

## 2020-02-03 NOTE — PROGRESS NOTES
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 States he has always bled easily after an injury but has been told his VW is not as severe as his sister. Sees Dr. Donna Kaiser for his VW. Patient had a CABG done in 2014 and received DDAVP during that period. Has HTN and is on 3 different BP medications. Recently had blood in stool and needs Colonoscopy today per Dr. Martha Fonseca. Patient is anxious about having all this done. Sister states he usually doesn't like going to doctors so he is a little irritable but cooperative. Patient remains NPO for colonoscopy. Is the patient experiencing any:  Fatigue:   [x]   None  []   Increase over baseline but not altering normal activities  []   Moderate of causing difficulty performing some activities  []   Severe or loss of ability to perform some activities  []   Bedridden or disabling    Dizziness or Lightheadedness:  [x]   None  []   No Interference  []   Interferes with functioning but not activities of daily living  []   Interferes with daily activies  []   Bedridden or disabling    Shortness of Breath:   []   None   [x]   Dyspneic on exertion   []   Dyspnea with normal activities  []   Dyspnea at rest      Patient currently having active bleeding? No   Nose bleed? No  Bleeding mouth or gums? No  Blood noted in urine? No  Blood noted in stool? Yes , recently  Patient have unexplained bruising? No   Patient have petechiae? No   Headache? No  Blurry Vision?   No    Patient Active Problem List   Diagnosis    Hyperlipidemia    CAD, multiple vessel    [facility]illebrand disease ([facility] [address]    S/P CABG (coronary artery bypass graft)  AI (aortic insufficiency)    Benign essential HTN    Right knee DJD    Primary localized osteoarthrosis, lower leg    Acute ischemic stroke (HCC)    Left-sided weakness    Slurred speech    Facial droop due to stroke    Anemia    Paronychia of great toe, left    Sensorineural hearing loss (SNHL) of both ears    Coronary artery disease involving native coronary artery of native heart without angina pectoris        Blood drawn prior to infusion; PTT, VonWillebrand Panel (VW activity, VW antigen, Factor 8)    Administered DDAVP via: [x] Peripheral access    [] PICC access    [] Port access    DDAVP 20 mcg in 50 ml infused over 30 minutes. No complications. Blood drawn again after infusion completed. Response to treatment:  Well tolerated by patient. Patient transferred to [facility] per wheelchair. Patient's sister accompanied patient to [facility].       Electronically signed by Delbert Villarreal RN on 2/3/2020 at 10:34 AM

## 2020-02-03 NOTE — OP NOTE
gI       Colonoscopy Procedure Note      Patient: Alessandra Gregory  : 1950  Acct#:     Procedure: Colonoscopy with biopsy, polypectomy (cold snare)    Date:  2/3/2020    Surgeon:  Joey Hernandez MD    Referring Physician:  Isai Crump DO    Previous Colonoscopy: NO  Date: N/A  Greater than 3 years: N/A    Preoperative Diagnosis:  1. Positive FIT    Postoperative Diagnosis:  1. Transverse Colon Polyps 2. Diverticulosis 3. Internal hemorrhoids     Consent:  The patient or their legal guardian has signed a consent, and is aware of the potential risks, benefits, alternatives, and potential complications of this procedure. These include, but are not limited to hemorrhage, bleeding, post procedural pain, perforation, phlebitis, aspiration, hypotension, hypoxia, cardiovascular events such as arryhthmia, and possibly death. Additionally, the possibility of missed colonic polyps and interval colon cancer was discussed in the consent. Anesthesia:  The patient was administered IV propofol per anesthesiology team.  Please see their operative records for full details. Procedure: An informed consent was obtained from the patient after explanation of indications, benefits, possible risks and complications of the procedure. The patient was then taken to the endoscopy suite, placed in the left lateral decubitus position, and the above IV anesthesia was administered. A digital rectal examination was performed and revealed negative without mass, lesions or tenderness. The Olympus video colonoscope was placed in the patient's rectum under digital direction and advanced to the cecum. The cecum was identified by characteristic anatomy and ballottment. The preparation was excellent. The ileocecal valve was identified. The terminal ileum was normal appearing. The scope was then withdrawn back through the cecum, ascending, transverse, descending, sigmoid colon, and rectum.   Careful

## 2020-02-03 NOTE — PROGRESS NOTES
Consent for closed reduction of jaw dislocation signed by pt's sister. Pt now waking up from anesthesia. States no more jaw pain, able to open and close mouth properly.

## 2020-02-03 NOTE — ANESTHESIA PRE PROCEDURE
[facility] Department of Anesthesiology  Pre-Anesthesia Evaluation/Consultation       Name:  Nicole Phpips  : 1950  Age:  71 y.o. MRN:  [id number]  Date: 2/3/2020           Surgeon: Surgeon(s):  Niurka Vazquez MD    Procedure: Procedure(s):  COLONOSCOPY     No Known Allergies  Patient Active Problem List   Diagnosis    Hyperlipidemia    CAD, multiple vessel    Von Willebrand disease ([address]    S/P CABG (coronary artery bypass graft)    AI (aortic insufficiency)    Benign essential HTN    Right knee DJD    Primary localized osteoarthrosis, lower leg    Acute ischemic stroke ([address]    Left-sided weakness    Slurred speech    Facial droop due to stroke    Anemia    Paronychia of great toe, left    Sensorineural hearing loss (SNHL) of both ears    Coronary artery disease involving native coronary artery of native heart without angina pectoris     Past Medical History:   Diagnosis Date    Acute ischemic stroke ([address] 10/24/2014    Right    Anemia     Arthritis     rt knee    CAD (coronary artery disease) 14    LAD, RCA, Diag - cariothoracic surgery    [place] (hard of hearing)     hermes-lateral hearing aids    HTN (hypertension)     Hyperlipidemia     Hypokalemia     Von Willebrand disease ([address]     Walking pneumonia      Past Surgical History:   Procedure Laterality Date    CORONARY ARTERY BYPASS GRAFT      JOINT REPLACEMENT Right 10/21/14    right total knee replacement    TONSILLECTOMY AND ADENOIDECTOMY       Social History     Tobacco Use    Smoking status: Never Smoker    Smokeless tobacco: Never Used   Substance Use Topics    Alcohol use: Yes     Alcohol/week: 3.0 standard drinks     Types: 3 Cans of beer per week    Drug use: No     Medications  No current facility-administered medications on file prior to encounter.       Current Outpatient Medications on File Prior to Encounter   Medication Sig Dispense Refill    amLODIPine (NORVASC) 5 MG tablet Take 2 tablets by mouth daily 90 tablet 3    aspirin 81 MG tablet Take 81 mg by mouth daily.  metoprolol succinate (TOPROL XL) 100 MG extended release tablet Take 1 tablet by mouth daily 30 tablet 3    lisinopril (PRINIVIL;ZESTRIL) 40 MG tablet Take 1 tablet by mouth daily 30 tablet 3    atorvastatin (LIPITOR) 80 MG tablet Take 1 tablet by mouth daily 90 tablet 3     No current facility-administered medications for this encounter. Vital Signs (Current)   Vitals:    20 1507   Weight: 200 lb (90.7 kg)   Height: 5' 9\" (1.753 m)                                          BP Readings from Last 3 Encounters:   20 (!) 157/76   20 (!) 150/72   19 (!) 17280     Vital Signs Statistics (for past 48 hrs)     Temp  Av °F (36.7 °C)  Min: 98 °F (36.7 °C)   Min taken time: 20 0905  Max: 98 °F (36.7 °C)   Max taken time: 20 0905  Pulse  Av  Min: 68   Min taken time: 20 0905  Max: 68   Max taken time: 20 0905  Resp  Av  Min: 25   Min taken time: 20 0905  Max: 18   Max taken time: 20 0905  BP  Min: 157/76   Min taken time: 20 0905  Max: 157/76   Max taken time: 20 0905  SpO2  Av %  Min: 97 %   Min taken time: 20 0905  Max: 97 %   Max taken time: 20 0905  BP Readings from Last 3 Encounters:   20 (!) 157/76   20 (!) 150/72   19 (!) 172/80       BMI  Body mass index is 29.53 kg/m². Estimated body mass index is 29.53 kg/m² as calculated from the following:    Height as of this encounter: 5' 9\" (1.753 m). Weight as of this encounter: 200 lb (90.7 kg).     CBC   Lab Results   Component Value Date    WBC 5.9 2019    RBC 4.80 2019    HGB 15.6 2019    HCT 45.6 2019    MCV 95.0 2019    RDW 14.1 2019     2019     CMP    Lab Results   Component Value Date     2019    K 4.6 2019     2019    CO2 28 12/06/2019    BUN 16 12/06/2019    CREATININE 0.8 12/06/2019    GFRAA >60 12/06/2019    AGRATIO 1.8 12/06/2019    LABGLOM >60 12/06/2019    GLUCOSE 106 12/06/2019    PROT 6.9 12/06/2019    CALCIUM 9.6 12/06/2019    BILITOT 0.8 12/06/2019    ALKPHOS 63 12/06/2019    AST 11 12/06/2019    ALT 17 12/06/2019     BMP    Lab Results   Component Value Date     12/06/2019    K 4.6 12/06/2019     12/06/2019    CO2 28 12/06/2019    BUN 16 12/06/2019    CREATININE 0.8 12/06/2019    CALCIUM 9.6 12/06/2019    GFRAA >60 12/06/2019    LABGLOM >60 12/06/2019    GLUCOSE 106 12/06/2019     POCGlucose  No results for input(s): GLUCOSE in the last 72 hours.    Coags    Lab Results   Component Value Date    PROTIME 13.2 10/28/2014    INR 1.21 10/28/2014    APTT 36.1 53/37/4443     HCG (If Applicable) No results found for: Kelle Do, HCG, HCGQUANT   ABGs   Lab Results   Component Value Date    PHART 7.389 06/03/2014    PO2ART 69 06/03/2014    AUG4XGW 41 06/03/2014    UMR8WRU 24.8 06/03/2014    BEART 0 06/03/2014    T3MUGEOQ 93 06/03/2014      Type & Screen (If Applicable)  No results found for: LABABO, LABRH                         BMI: Wt Readings from Last 3 Encounters:       NPO Status:                          Anesthesia Evaluation  Patient summary reviewed no history of anesthetic complications:   Airway: Mallampati: II  TM distance: >3 FB   Neck ROM: full  Mouth opening: > = 3 FB Dental: normal exam         Pulmonary:       (-) pneumonia                           Cardiovascular:    (+) hypertension:, valvular problems/murmurs: AI, CAD:, CABG/stent:, hyperlipidemia    (-) pacemaker                Neuro/Psych:   (+) CVA:,    (-) seizures           GI/Hepatic/Renal:   (+) bowel prep,      (-) liver disease, no renal disease and no morbid obesity       Endo/Other:    (+) blood dyscrasia: VWd and anemia:., .    (-) diabetes mellitus, hypothyroidism               Abdominal:           Vascular:     - DVT and PE. Anesthesia Plan      MAC     ASA 3     (Completed ddavp infusion )  Induction: intravenous. Anesthetic plan and risks discussed with patient. Plan discussed with CRNA. Attending anesthesiologist reviewed and agrees with Pre Eval content              This pre-anesthesia assessment may be used as a history and physical.    DOS STAFF ADDENDUM:    Pt seen and examined, chart reviewed (including anesthesia, drug and allergy history). No interval changes to history and physical examination. Anesthetic plan, risks, benefits, alternatives, and personnel involved discussed with patient. Patient verbalized an understanding and agrees to proceed.       Trae Marx MD  February 3, 2020  11:04 AM

## 2020-02-03 NOTE — PROCEDURES
[address] [facility] SURGERY  OPERATIVE REPORT    Patient Name: Lisa Main  YOB: 1950  Medical Record Number:  [id number]  Billing Number:  [id number]  Date of Procedure: [unfilled]  Time: 1100    Pre Operative Diagnoses:   Dislocated temporomandibular joint  Post Operative Diagnoses:    Dislocated temporomandibular joint  Procedure: Closed reduction of the dislocated temporomandibular joint       Surgeon: Yaya Emery MD  OR Staff/ Assistant:  Sedation Nurse: Erica Shaikh RN  Endoscopy Technician: Chery Dangelo    Anesthesia: Sedation     Findings:  1) dislocated right temporomandibular joint    Indications: This is a 71 y.o. male with reportedly underwent a colonoscopy earlier today. I was called in recovery because he had dislocation of his temporomandibular joint. On my exam his mouth was open with about 2-1/2 cm at the incisors. He could not open or close his mouth from this point. He reportedly has a history of this happening before a number of years ago. He required injection of something into the muscles around the joint in order to relax him to get it to go back into place. The decision was made to give him some sedation to try to reduce the dislocation. His wife signed consent to proceed as he had already had sedation prior to me seeing him. DETAILS OF PROCEDURE(S):   The patient was in recovery. Anesthesia gave him some propofol for deep sedation. I grabbed his mandible and pulled anteriorly and you could feel the right temporomandibular joint move. It seemed to slip back out of place quickly. I had a twisted side to side and he suddenly went back into occlusion. He seemed to stay in occlusion while he woke up. He was able to open and close his mouth. I attest that I was present for and did the entire procedure myself.     Estimated Blood Loss: None                 This patient's drill went back into occlusion with sedation and manipulation. I still recommend that he see oral surgery to discuss this with them to see if anything else needs to be done.     Janae Luo

## 2020-02-05 RX ORDER — AMLODIPINE BESYLATE 10 MG/1
10 TABLET ORAL DAILY
Qty: 90 TABLET | Refills: 1 | Status: SHIPPED | OUTPATIENT
Start: 2020-02-05 | End: 2020-07-06

## 2020-02-06 LAB
FACTOR VIII ACTIVITY: 158 % (ref 56–191)
VON WILLEBRAND ACTIVITY RCF: 94 % (ref 51–215)
VON WILLEBRAND AG: 120 % (ref 52–214)

## 2020-03-24 RX ORDER — LISINOPRIL 40 MG/1
TABLET ORAL
Qty: 30 TABLET | Refills: 2 | Status: SHIPPED | OUTPATIENT
Start: 2020-03-24 | End: 2020-06-23

## 2020-03-24 RX ORDER — METOPROLOL SUCCINATE 100 MG/1
TABLET, EXTENDED RELEASE ORAL
Qty: 30 TABLET | Refills: 2 | Status: SHIPPED | OUTPATIENT
Start: 2020-03-24 | End: 2020-06-23

## 2020-06-19 ENCOUNTER — VIRTUAL VISIT (OUTPATIENT)
Dept: INTERNAL MEDICINE CLINIC | Age: 70
End: 2020-06-19
Payer: MEDICARE

## 2020-06-19 PROCEDURE — 99442 PR PHYS/QHP TELEPHONE EVALUATION 11-20 MIN: CPT | Performed by: INTERNAL MEDICINE

## 2020-06-19 NOTE — PROGRESS NOTES
minutes (20 minutes)    Note: not billable if this call serves to triage the patient into an appointment for the relevant concern      Nadya Kennedy

## 2020-06-22 DIAGNOSIS — E78.5 HYPERLIPIDEMIA, UNSPECIFIED HYPERLIPIDEMIA TYPE: ICD-10-CM

## 2020-06-22 DIAGNOSIS — R73.01 IMPAIRED FASTING GLUCOSE: ICD-10-CM

## 2020-06-22 DIAGNOSIS — I10 BENIGN ESSENTIAL HTN: ICD-10-CM

## 2020-06-22 DIAGNOSIS — Z12.5 SCREENING FOR PROSTATE CANCER: ICD-10-CM

## 2020-06-22 DIAGNOSIS — I25.10 CORONARY ARTERY DISEASE INVOLVING NATIVE CORONARY ARTERY OF NATIVE HEART WITHOUT ANGINA PECTORIS: ICD-10-CM

## 2020-06-22 LAB
A/G RATIO: 1.9 (ref 1.1–2.2)
ALBUMIN SERPL-MCNC: 4.3 G/DL (ref 3.4–5)
ALP BLD-CCNC: 68 U/L (ref 40–129)
ALT SERPL-CCNC: 10 U/L (ref 10–40)
ANION GAP SERPL CALCULATED.3IONS-SCNC: 15 MMOL/L (ref 3–16)
AST SERPL-CCNC: 10 U/L (ref 15–37)
BASOPHILS ABSOLUTE: 0 K/UL (ref 0–0.2)
BASOPHILS RELATIVE PERCENT: 0.6 %
BILIRUB SERPL-MCNC: 0.6 MG/DL (ref 0–1)
BUN BLDV-MCNC: 13 MG/DL (ref 7–20)
CALCIUM SERPL-MCNC: 9.1 MG/DL (ref 8.3–10.6)
CHLORIDE BLD-SCNC: 108 MMOL/L (ref 99–110)
CHOLESTEROL, TOTAL: 138 MG/DL (ref 0–199)
CO2: 21 MMOL/L (ref 21–32)
CREAT SERPL-MCNC: 0.6 MG/DL (ref 0.8–1.3)
EOSINOPHILS ABSOLUTE: 0.2 K/UL (ref 0–0.6)
EOSINOPHILS RELATIVE PERCENT: 2.7 %
GFR AFRICAN AMERICAN: >60
GFR NON-AFRICAN AMERICAN: >60
GLOBULIN: 2.3 G/DL
GLUCOSE BLD-MCNC: 124 MG/DL (ref 70–99)
HCT VFR BLD CALC: 45.6 % (ref 40.5–52.5)
HDLC SERPL-MCNC: 47 MG/DL (ref 40–60)
HEMOGLOBIN: 15.5 G/DL (ref 13.5–17.5)
LDL CHOLESTEROL CALCULATED: 79 MG/DL
LYMPHOCYTES ABSOLUTE: 1.8 K/UL (ref 1–5.1)
LYMPHOCYTES RELATIVE PERCENT: 21.8 %
MCH RBC QN AUTO: 32.5 PG (ref 26–34)
MCHC RBC AUTO-ENTMCNC: 34 G/DL (ref 31–36)
MCV RBC AUTO: 95.8 FL (ref 80–100)
MONOCYTES ABSOLUTE: 0.5 K/UL (ref 0–1.3)
MONOCYTES RELATIVE PERCENT: 6.4 %
NEUTROPHILS ABSOLUTE: 5.6 K/UL (ref 1.7–7.7)
NEUTROPHILS RELATIVE PERCENT: 68.5 %
PDW BLD-RTO: 13.3 % (ref 12.4–15.4)
PLATELET # BLD: 201 K/UL (ref 135–450)
PMV BLD AUTO: 8 FL (ref 5–10.5)
POTASSIUM SERPL-SCNC: 4.3 MMOL/L (ref 3.5–5.1)
PROSTATE SPECIFIC ANTIGEN: 3.56 NG/ML (ref 0–4)
RBC # BLD: 4.76 M/UL (ref 4.2–5.9)
SODIUM BLD-SCNC: 144 MMOL/L (ref 136–145)
TOTAL PROTEIN: 6.6 G/DL (ref 6.4–8.2)
TRIGL SERPL-MCNC: 60 MG/DL (ref 0–150)
VLDLC SERPL CALC-MCNC: 12 MG/DL
WBC # BLD: 8.2 K/UL (ref 4–11)

## 2020-06-23 LAB
ESTIMATED AVERAGE GLUCOSE: 91.1 MG/DL
HBA1C MFR BLD: 4.8 %

## 2020-06-23 RX ORDER — METOPROLOL SUCCINATE 100 MG/1
TABLET, EXTENDED RELEASE ORAL
Qty: 90 TABLET | Refills: 3 | Status: SHIPPED | OUTPATIENT
Start: 2020-06-23 | End: 2021-06-09

## 2020-06-23 RX ORDER — LISINOPRIL 40 MG/1
TABLET ORAL
Qty: 90 TABLET | Refills: 3 | Status: SHIPPED | OUTPATIENT
Start: 2020-06-23 | End: 2021-06-09

## 2020-07-06 RX ORDER — AMLODIPINE BESYLATE 10 MG/1
TABLET ORAL
Qty: 90 TABLET | Refills: 0 | Status: SHIPPED | OUTPATIENT
Start: 2020-07-06 | End: 2020-09-30

## 2020-07-08 ENCOUNTER — TELEPHONE (OUTPATIENT)
Dept: INTERNAL MEDICINE CLINIC | Age: 70
End: 2020-07-08

## 2020-07-08 NOTE — TELEPHONE ENCOUNTER
Patient is having cataract surgery on 7/31/20 by Dr. Neelam Jaramillo ([facility]). Patient is requesting a PreOp appointment. Patient states that his PreOp paperwork indicates that an EKG is needed. Ok to schedule?

## 2020-07-23 NOTE — PROGRESS NOTES
[address] [facility] time___0640_________        Surgery time__0810__________    Take the following medications with a sip of water:      Do not eat or drink anything after 12:00 midnight prior to your surgery. This includes water chewing gum, mints and ice chips. You may brush your teeth and gargle the morning of your surgery, but do not swallow the water     Please see your family doctor/pediatrician for a history and physical and/or concerning medications. H&P 7/24/20 Dr. Lucy Stein    Bring any test results/reports from your physicians office. If you are under the care of a heart doctor or specialist doctor, please be aware that you may be asked to them for clearance    You may be asked to stop blood thinners such as Coumadin, Plavix, Fragmin, Lovenox, etc., or any anti-inflammatories such as:  Aspirin, Ibuprofen, Advil, Naproxen prior to your surgery. We also ask that you stop any OTC medications such as fish oil, vitamin E, glucosamine, garlic, Multivitamins, COQ 10, etc.    We ask that you do not smoke 24 hours prior to surgery  We ask that you do not  drink any alcoholic beverages 24 hours prior to surgery     You must make arrangements for a responsible adult to take you home after your surgery. For your safety you will not be allowed to leave alone or drive yourself home. Your surgery will be cancelled if you do not have a ride home. Also for your safety, it is strongly suggested that someone stay with you the first 24 hours after your surgery. A parent or legal guardian must accompany a child scheduled for surgery and plan to stay at the hospital until the child is discharged. Please do not bring other children with you. For your comfort, please wear simple loose fitting clothing to the hospital.  Please do not bring valuables. Wear short sleeve button down shirt or loose fitting shirt. Bring eye drops or ointment day of surgery.     Do not wear any make-up or nail polish on your fingers or toes      For your safety, please do not wear any jewelry or body piercing's on the day of surgery. All jewelry must be removed. If you have dentures, they will be removed before going to operating room. For your convenience, we will provide you with a container. If you wear contact lenses or glasses, they will be removed, please bring a case for them. If you have a living will and a durable power of  for healthcare, please bring in a copy. As part of our patient safety program to minimize surgical site infections, we ask you to do the following:    · Please notify your surgeon if you develop any illness between         now and the  day of your surgery. · This includes a cough, cold, fever, sore throat, nausea,         or vomiting, and diarrhea, etc.  ·  Please notify your surgeon if you experience dizziness, shortness         of breath or blurred vision between now and the time of your surgery. Do not shave your operative site 96 hours prior to surgery. For face and neck surgery, men may use an electric razor 48 hours   prior to surgery. You may shower the night before surgery or the morning of   your surgery with an antibacterial soap. You will need to bring a photo ID and insurance card    [facility] has an onsite pharmacy, would you like to utilize our pharmacy     If you will be staying overnight and use a C-pap machine, please bring   your C-pap to hospital     Our goal is to provide you with excellent care, therefore, visitors will be limited to two(2) in the room at a time so that we may focus on providing this care for you. Please contact pre-admission testing if you have any further questions. [facility] phone number:  [phone]  Please note these are generalized instructions for all surgical cases, you may be provided with more specific instructions according to your surgery.

## 2020-07-29 ENCOUNTER — OFFICE VISIT (OUTPATIENT)
Dept: INTERNAL MEDICINE CLINIC | Age: 70
End: 2020-07-29
Payer: MEDICARE

## 2020-07-29 VITALS
BODY MASS INDEX: 29.77 KG/M2 | SYSTOLIC BLOOD PRESSURE: 142 MMHG | DIASTOLIC BLOOD PRESSURE: 60 MMHG | HEART RATE: 80 BPM | HEIGHT: 69 IN | OXYGEN SATURATION: 98 % | WEIGHT: 201 LBS | RESPIRATION RATE: 12 BRPM

## 2020-07-29 PROCEDURE — 99214 OFFICE O/P EST MOD 30 MIN: CPT | Performed by: INTERNAL MEDICINE

## 2020-07-29 PROCEDURE — G0444 DEPRESSION SCREEN ANNUAL: HCPCS | Performed by: INTERNAL MEDICINE

## 2020-07-29 RX ORDER — ESCITALOPRAM OXALATE 5 MG/1
5 TABLET ORAL DAILY
Qty: 90 TABLET | Refills: 1 | Status: SHIPPED | OUTPATIENT
Start: 2020-07-29 | End: 2020-08-17 | Stop reason: DRUGHIGH

## 2020-07-29 ASSESSMENT — PATIENT HEALTH QUESTIONNAIRE - PHQ9
7. TROUBLE CONCENTRATING ON THINGS, SUCH AS READING THE NEWSPAPER OR WATCHING TELEVISION: 0
9. THOUGHTS THAT YOU WOULD BE BETTER OFF DEAD, OR OF HURTING YOURSELF: 0
6. FEELING BAD ABOUT YOURSELF - OR THAT YOU ARE A FAILURE OR HAVE LET YOURSELF OR YOUR FAMILY DOWN: 3
SUM OF ALL RESPONSES TO PHQ QUESTIONS 1-9: 10
1. LITTLE INTEREST OR PLEASURE IN DOING THINGS: 2
SUM OF ALL RESPONSES TO PHQ9 QUESTIONS 1 & 2: 3
5. POOR APPETITE OR OVEREATING: 1
3. TROUBLE FALLING OR STAYING ASLEEP: 1
SUM OF ALL RESPONSES TO PHQ QUESTIONS 1-9: 10
4. FEELING TIRED OR HAVING LITTLE ENERGY: 2
10. IF YOU CHECKED OFF ANY PROBLEMS, HOW DIFFICULT HAVE THESE PROBLEMS MADE IT FOR YOU TO DO YOUR WORK, TAKE CARE OF THINGS AT HOME, OR GET ALONG WITH OTHER PEOPLE: 2
2. FEELING DOWN, DEPRESSED OR HOPELESS: 1
8. MOVING OR SPEAKING SO SLOWLY THAT OTHER PEOPLE COULD HAVE NOTICED. OR THE OPPOSITE, BEING SO FIGETY OR RESTLESS THAT YOU HAVE BEEN MOVING AROUND A LOT MORE THAN USUAL: 0

## 2020-07-29 NOTE — PATIENT INSTRUCTIONS
Patient Education        Recovering From Depression: Care Instructions  Your Care Instructions     Taking good care of yourself is important as you recover from depression. In time, your symptoms will fade as your treatment takes hold. Do not give up. Instead, focus your energy on getting better. Your mood will improve. It just takes some time. Focus on things that can help you feel better, such as being with friends and family, eating well, and getting enough rest. But take things slowly. Do not do too much too soon. You will begin to feel better gradually. Follow-up care is a key part of your treatment and safety. Be sure to make and go to all appointments, and call your doctor if you are having problems. It's also a good idea to know your test results and keep a list of the medicines you take. How can you care for yourself at home? Be realistic  · If you have a large task to do, break it up into smaller steps you can handle, and just do what you can. · You may want to put off important decisions until your depression has lifted. If you have plans that will have a major impact on your life, such as marriage, divorce, or a job change, try to wait a bit. Talk it over with friends and loved ones who can help you look at the overall picture first.  · Reaching out to people for help is important. Do not isolate yourself. Let your family and friends help you. Find someone you can trust and confide in, and talk to that person. · Be patient, and be kind to yourself. Remember that depression is not your fault and is not something you can overcome with willpower alone. Treatment is necessary for depression, just like for any other illness. Feeling better takes time, and your mood will improve little by little. Stay active  · Stay busy and get outside. Take a walk, or try some other light exercise. · Talk with your doctor about an exercise program. Exercise can help with mild depression.   · Go to a movie or concert. Take part in a Caodaism activity or other social gathering. Go to a [employer] game. · Ask a friend to have dinner with you. Take care of yourself  · Eat a balanced diet with plenty of fresh fruits and vegetables, whole grains, and lean protein. If you have lost your appetite, eat small snacks rather than large meals. · Avoid drinking alcohol or using illegal drugs. Do not take medicines that have not been prescribed for you. They may interfere with medicines you may be taking for depression, or they may make your depression worse. · Take your medicines exactly as they are prescribed. You may start to feel better within 1 to 3 weeks of taking antidepressant medicine. But it can take as many as 6 to 8 weeks to see more improvement. If you have questions or concerns about your medicines, or if you do not notice any improvement by 3 weeks, talk to your doctor. · If you have any side effects from your medicine, tell your doctor. Antidepressants can make you feel tired, dizzy, or nervous. Some people have dry mouth, constipation, headaches, sexual problems, or diarrhea. Many of these side effects are mild and will go away on their own after you have been taking the medicine for a few weeks. Some may last longer. Talk to your doctor if side effects are bothering you too much. You might be able to try a different medicine. · Get enough sleep. If you have problems sleeping:  ? Go to bed at the same time every night, and get up at the same time every morning. ? Keep your bedroom dark and quiet. ? Do not exercise after 5:00 p.m.  ? Avoid drinks with caffeine after 5:00 p.m. · Avoid sleeping pills unless they are prescribed by the doctor treating your depression. Sleeping pills may make you groggy during the day, and they may interact with other medicine you are taking. · If you have any other illnesses, such as diabetes, heart disease, or high blood pressure, make sure to continue with your treatment.  Tell your doctor about all of the medicines you take, including those with or without a prescription. · Keep the numbers for these national suicide hotlines: 8-991-529-TALK (6-909.374.6133) and 4-564-SJTDSFD (9-616.514.3285). If you or someone you know talks about suicide or feeling hopeless, get help right away. When should you call for help? TYBO949 anytime you think you may need emergency care. For example, call if:  · You feel like hurting yourself or someone else. · Someone you know has depression and is about to attempt or is attempting suicide. Call your doctor now or seek immediate medical care if:  · You hear voices. · Someone you know has depression and:  ? Starts to give away his or her possessions. ? Uses illegal drugs or drinks alcohol heavily. ? Talks or writes about death, including writing suicide notes or talking about guns, knives, or pills. ? Starts to spend a lot of time alone. ? Acts very aggressively or suddenly appears calm. Watch closely for changes in your health, and be sure to contact your doctor if:  · You do not get better as expected. Where can you learn more? Go to https://[employer].MONOQI. org and sign in to your [employer] account. Enter P838 in the [employer] box to learn more about \"Recovering From Depression: Care Instructions. \"     If you do not have an account, please click on the \"Sign Up Now\" link. Current as of: January 31, 2020               Content Version: 12.5  © 2006-2020 Healthwise, Incorporated. Care instructions adapted under license by [facility] ([facility]). If you have questions about a medical condition or this instruction, always ask your healthcare professional. [place] [address] any warranty or liability for your use of this information. Patient Education        Depression Treatment: Care Instructions  Your Care Instructions     Depression is a condition that affects the way you feel, think, and act.  It causes are pregnant or breastfeeding, talk to your doctor about what medicines you can take. Learn about counseling  In many cases, counseling can work as well as medicines to treat mild to moderate depression. Counseling is done by licensed mental health providers, such as psychologists, social workers, and some types of nurses. It can be done in one-on-one sessions or in a group setting. Many people find group sessions helpful. Cognitive-behavioral therapy is a type of counseling. In this treatment therapy, you learn how to see and change unhelpful thinking styles that may be adding to your depression. Counseling and medicines often work well when used together. To manage depression  · Be physically active. Getting 30 minutes of exercise each day is good for your body and your mind. Begin slowly if it is hard for you to get started. If you already exercise, keep it up. · Plan something pleasant for yourself every day. Include activities that you have enjoyed in the past.  · Get enough sleep. Talk to your doctor if you have problems sleeping. · Eat a balanced diet. If you do not feel hungry, eat small snacks rather than large meals. · Do not drink alcohol, use illegal drugs, or take medicines that your doctor has not prescribed for you. They may interfere with your treatment. · Spend time with family and friends. It may help to speak openly about your depression with people you trust.  · Take your medicines exactly as prescribed. Call your doctor if you think you are having a problem with your medicine. · Do not make major life decisions while you are depressed. Depression may change the way you think. You will be able to make better decisions after you feel better. · Think positively. Challenge negative thoughts with statements such as \"I am hopeful\"; \"Things will get better\"; and \"I can ask for the help I need. \" Write down these statements and read them often, even if you don't believe them yet.   · Be patient with yourself. It took time for your depression to develop, and it will take time for your symptoms to improve. Do not take on too much or be too hard on yourself. · Learn all you can about depression from written and online materials. · Check out behavioral health classes to learn more about dealing with depression. · Keep the numbers for these national suicide hotlines: 7-365-918-TALK (2-721.838.9869) and 1-722-VXFQKAV (6-741.649.5620). If you or someone you know talks about suicide or feeling hopeless, get help right away. When should you call for help? PELY131 anytime you think you may need emergency care. For example, call if:  · You feel you cannot stop from hurting yourself or someone else. Call your doctor now or seek immediate medical care if:  · You hear voices. · You feel much more depressed. Watch closely for changes in your health, and be sure to contact your doctor if:  · You are having problems with your depression medicine. · You are not getting better as expected. Where can you learn more? Go to https://[employer] Networks[employer] Solutions.SumAll. org and sign in to your [employer] account. Enter N606 in the [employer] box to learn more about \"Depression Treatment: Care Instructions. \"     If you do not have an account, please click on the \"Sign Up Now\" link. Current as of: January 31, 2020               Content Version: 12.5  © 0937-0134 [employer] (formerly [employer]). Care instructions adapted under license by [facility] ([facility]). If you have questions about a medical condition or this instruction, always ask your healthcare professional. [place] [address] any warranty or liability for your use of this information. Patient Education        Adjustment Disorder: Care Instructions  Your Care Instructions     Adjustment disorder means that you have emotional or behavioral problems because of stress. But your response to the stress is far more severe than a normal response.  It is deeply, filling up the area between your navel and your rib cage. Breathe so that your belly goes up and down. · Do not hold your breath. · Breathe like this for 5 to 10 minutes. Notice the feeling of calmness throughout your whole body. As you continue to breathe slowly and deeply, relax by doing these next steps for another 5 to 10 minutes:  · Tighten and relax each muscle group in your body. Start at your toes, and work your way up to your head. · Imagine your muscle groups relaxing and getting heavy. · Empty your mind of all thoughts. · Let yourself relax more and more deeply. · Be aware of the state of calmness that surrounds you. · When your relaxation time is over, you can bring yourself back to alertness by moving your fingers and toes. Then move your hands and feet. And then move your entire body. Sometimes people fall asleep during relaxation. But they most often wake up soon. · Always give yourself time to return to full alertness before you drive a car. Wait to do anything that might cause an accident if you are not fully alert. Never play a relaxation tape while you drive a car. When should you call for help? TZAY315 anytime you think you may need emergency care. For example, call if:  · You feel you cannot stop from hurting yourself or someone else. Keep the numbers for these national suicide hotlines: 0-479-048-TALK (5-676-542-078-418-9354) and 4-115-PYMMTNT (7-940.274.8019). If you or someone you know talks about suicide or feeling hopeless, get help right away. Watch closely for changes in your health, and be sure to contact your doctor if:  · You have new anxiety, or your anxiety gets worse. · You have been feeling sad, depressed, or hopeless or have lost interest in things that you usually enjoy. · You do not get better as expected. Where can you learn more? Go to https://manjula.ChipCare. org and sign in to your [employer] account.  Enter [id number] in the [place] box to learn more about \"Adjustment Disorder: Care Instructions. \"     If you do not have an account, please click on the \"Sign Up Now\" link. Current as of: December 16, 2019               Content Version: 12.5  © 1662-3455 Healthwise, Incorporated. Care instructions adapted under license by [facility] ([facility]). If you have questions about a medical condition or this instruction, always ask your healthcare professional. [address] any warranty or liability for your use of this information.

## 2020-07-29 NOTE — PROGRESS NOTES
[facility]) Physicians  Internal Medicine  Patient Encounter  Oracio Zhang D.O., [facility]          Chief Complaint   Patient presents with    Pre-op Exam     [facility]  rt cataract 7/31/20       HPI-- 79 y.o. male presents today for a preoperative physical.  Pt diagnosed with Bl cataracts. He has been struggling with decreased visual acuity over the last several months. He is also struggling with hearing loss, but he wanted to get his eyes dealt with. His visual difficulty is affecting his activities of daily living. The combination of his vision and hearing difficulties and social isolation, he has become somewhat depressed. He is now scheduled for BL cataract removal with IOL implants. I have been asked to see patient for pre-operative risk assessment and clearance. Surgery right eye scheduled for 7/31/2020 followed by the left eye on 8/6/2020. Surgery will be performed by Dr. Dejah Rangel at [facility]. CAD--he denies any chest pain, shortness of breath, orthopnea, or edema. [place] He is under the care of cardiology. Hypertension--patient denies any headaches, dizziness, lightheadedness or any other symptoms suggestive of accelerated blood pressures. He denies any episodes of unilateral weakness or paresthesias, speech or visual disturbances. He denies any new symptoms suggestive of recurrent stroke. 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 He has had to receive presurgical treatment for major surgeries with Humate-P. He is under the care of hematology. He denies any recent bleeding. As noted above, he has been feeling a little depressed. The pandemic has created a feeling of isolation. He has days when her does not want to do anything. He feels down and blue. He denies crying. He reports feels of hopelessness, worthlessness and low self esteem. Motivation is poor. He has lack of donna in life. He has lack of interest in daily activities. He denies SI, HI.   He does have some passive thoughts of death, but denies thoughts of self harm. He reports family H/O of mental health disorders. His sisters have indicated to him that he seems depressed. He is interested in medication. Medical/Surgical Histories     Past Medical History:   Diagnosis Date    Acute ischemic stroke ([facility] [place] [address] 10/24/2014    Right    Anemia     Arthritis     rt knee    CAD (coronary artery disease) 5/29/14    LAD, RCA, Diag - cariothoracic surgery    Colon polyps 02/03/2020    Tubular adenoma, Dr. Jaylon Dalton (hard of hearing)     hermes-lateral hearing aids    HTN (hypertension)     Hyperlipidemia     Hypokalemia     Jaw dislocation     Von Willebrand disease ([facility] [place] [address]     Walking pneumonia       No prior H/O DVT, PE or bleeding dyscrasias    Past Surgical History:   Procedure Laterality Date    COLONOSCOPY  02/03/2020    Dr. Trejo Head N/A 2/3/2020    COLONOSCOPY POLYPECTOMY SNARE/COLD BIOPSY performed by Jez Gillis MD at [address]      2015    JOINT REPLACEMENT Right 10/21/14    right total knee replacement    TONSILLECTOMY AND ADENOIDECTOMY  1955           Medications/Allergies     Medication Sig    amLODIPine (NORVASC) 10 MG tablet TAKE 1 TABLET BY MOUTH ONE TIME A DAY  (Patient taking differently: Indications: takes at noon )    metoprolol succinate (TOPROL XL) 100 MG extended release tablet TAKE 1 TABLET BY MOUTH ONE TIME A DAY  (Patient taking differently: Indications: takes noon )    lisinopril (PRINIVIL;ZESTRIL) 40 MG tablet TAKE 1 TABLET BY MOUTH ONE TIME A DAY  (Patient taking differently: Indications: takes at noon )    atorvastatin (LIPITOR) 80 MG tablet Take 1 tablet by mouth daily    aspirin 81 MG tablet Take 81 mg by mouth daily.          No Known Allergies      Substance Use History     Social History     Tobacco Use    Smoking status: Never Smoker    Smokeless tobacco: Never Used   Substance Use Topics    Neg  Polydipsia,polyuria,abnormal weight changes,heat /cold intolerance, Hair changes. No H/O Diabetes or Thyroid disease. Physical Exam    BP (!) 142/60   Pulse 80   Resp 12   Ht 5' 9\" (1.753 m)   Wt 201 lb (91.2 kg)   SpO2 98%   BMI 29.68 kg/m²   GEN:  79 y.o. male who is in NAD, A&O. He appears stated age and well nourished. He is cooperative and pleasant. HEAD:  NC/AT, no lesions. EYES:  MALKA, EOMI, No scleral icterus or conjunctival injection or discharge. Visual fields in tact to confrontation. EARS:  EAC's clear, TM's normal.  NOSE:  Nasal cavity is clear. No mucosal congestion or discharge. Sinuses are nontender. MOUTH & THROAT:  Oral cavity is clear without mucosal lesions. Tongue is midline. Dentition is in good repair. No pharyngeal erythema or exudate. NECK:  Supple. Full ROM. Trachea is midline. No increased JVD. No thyromegaly or nodules. No masses  LYMPH: No C/SC/A/F nodes  CARDIAC:  S1S2 NL. Regular rhythm. Soft 1/6 systolic murmur, prominent 2/6 diastolic murmur  VASC:  Pedal pulses 2/4. Carotid upstrokes 2+. No bruits noted. PULM:  Lungs are CTA. Symmetric breath sounds noted. AP Diameter NL. GI:  Abdomen is soft and nontender. No distension. No organomegaly. No masses. No pulsatile masses. EXT:  No Cyanosis or clubbing. 1+ LE edema, pitting. SKIN: Warm and dry, normal turgor, no rash or lesions of concern. NEURO:  Cranial nerves 2-12 are NL. Speech fluent and coherent. Strength is 5/5 in all muscle groups. No sensory deficits. No focal or lateralizing deficits. Reflexes 2/4 and symmetric. Gait is normal.  MS:  No C/T/L paraspinal tenderness. No scoliosis. No joint effusions. Full joint ROM. PSYCH:  Mood and affect NL.   Judgement and insight NL.      PHQ Scores 7/29/2020 1/7/2020 6/10/2019   PHQ2 Score 3 0 2   PHQ9 Score 10 0 2     Interpretation of Total Score Depression Severity: 1-4 = Minimal depression, 5-9 = Mild depression, 10-14 = Moderate depression, 15-19 = Moderately severe depression, 20-27 = Severe depression      Encounter Diagnoses   Name Primary?  Preop exam for internal medicine Yes    Cataract of both eyes, unspecified cataract type     Benign essential HTN     Coronary artery disease involving native coronary artery of native heart without angina pectoris     [facility] [address]     Impaired fasting glucose     Sensorineural hearing loss (SNHL) of both ears     Nonrheumatic aortic valve insufficiency     Current moderate episode of major depressive disorder without prior episode ([address]        Plan:  Acceptable risk for the planned procedure. No contraindications at this time    EKG--not needed  Lab--not needed    Pt will avoid NSAID's, OTC vitamin supplements and fish oil 1 week prior to procedure      Start Lexapro 5 mg daily. May need to titrate medication.     Refer to ENT        Electronically Signed:  Marilou French D.O      Copy of H&P given to pt and sent to S[facility]

## 2020-07-30 ENCOUNTER — ANESTHESIA EVENT (OUTPATIENT)
Dept: SURGERY | Age: 70
End: 2020-07-30
Payer: MEDICARE

## 2020-07-30 ENCOUNTER — TELEPHONE (OUTPATIENT)
Dept: INTERNAL MEDICINE CLINIC | Age: 70
End: 2020-07-30

## 2020-07-30 NOTE — TELEPHONE ENCOUNTER
Left message for patient on 7/30 at 2:20 asking him to call office and schedule 2 week follow up. I explained we could do as a TELEPHONE call since he does not have cell phone and all open appts week of 8/10 are later in the day.

## 2020-07-30 NOTE — PROGRESS NOTES
[address] [facility]  [phone]        Pre-Op Phone Call:     Patient Name: Catarina Cowan     Telephone Information:   Mobile [phone]     Home phone:  [phone]    Surgery Time:    8:10 AM     Arrival Time:  1687     Left extended Message:  Yes     Message left with: VM     Recent change in health status:  Call MDAdvised of transportation/ policy:  Yes     NPO policy reviewed: Yes vm     Advised to take morning heart/blood pressure medications with sips of water morning of surgery? Yes     Instructed to bring eye drops, photo identification, and insurance card day of surgery? Yes     Advised to wear short sleeved button down shirt (no T-shirt underneath):  Yes     Advised not to wear jewelry, hairpins, or pantyhose day of surgery? Yes     Advised not to wear make-up and to wash face day of surgery?   Yes    Remarks:        Electronically signed by:  Haleigh Esposito RN at 7/30/2020 2:02 PM

## 2020-07-31 ENCOUNTER — ANESTHESIA (OUTPATIENT)
Dept: SURGERY | Age: 70
End: 2020-07-31
Payer: MEDICARE

## 2020-07-31 ENCOUNTER — HOSPITAL ENCOUNTER (OUTPATIENT)
Age: 70
Setting detail: OUTPATIENT SURGERY
Discharge: HOME | End: 2020-07-31
Attending: OPHTHALMOLOGY | Admitting: OPHTHALMOLOGY
Payer: MEDICARE

## 2020-07-31 VITALS
WEIGHT: 200 LBS | RESPIRATION RATE: 14 BRPM | BODY MASS INDEX: 29.62 KG/M2 | SYSTOLIC BLOOD PRESSURE: 140 MMHG | OXYGEN SATURATION: 94 % | DIASTOLIC BLOOD PRESSURE: 54 MMHG | TEMPERATURE: 97.3 F | HEIGHT: 69 IN | HEART RATE: 73 BPM

## 2020-07-31 VITALS — DIASTOLIC BLOOD PRESSURE: 50 MMHG | SYSTOLIC BLOOD PRESSURE: 136 MMHG | OXYGEN SATURATION: 99 %

## 2020-07-31 PROCEDURE — 2500000003 HC RX 250 WO HCPCS: Performed by: OPHTHALMOLOGY

## 2020-07-31 PROCEDURE — 7100000010 HC PHASE II RECOVERY - FIRST 15 MIN: Performed by: OPHTHALMOLOGY

## 2020-07-31 PROCEDURE — 3600000004 HC SURGERY LEVEL 4 BASE: Performed by: OPHTHALMOLOGY

## 2020-07-31 PROCEDURE — 6370000000 HC RX 637 (ALT 250 FOR IP): Performed by: OPHTHALMOLOGY

## 2020-07-31 PROCEDURE — 3600000014 HC SURGERY LEVEL 4 ADDTL 15MIN: Performed by: OPHTHALMOLOGY

## 2020-07-31 PROCEDURE — 3700000000 HC ANESTHESIA ATTENDED CARE: Performed by: OPHTHALMOLOGY

## 2020-07-31 PROCEDURE — 2580000003 HC RX 258: Performed by: ANESTHESIOLOGY

## 2020-07-31 PROCEDURE — V2632 POST CHMBR INTRAOCULAR LENS: HCPCS | Performed by: OPHTHALMOLOGY

## 2020-07-31 PROCEDURE — 2709999900 HC NON-CHARGEABLE SUPPLY: Performed by: OPHTHALMOLOGY

## 2020-07-31 PROCEDURE — 3700000001 HC ADD 15 MINUTES (ANESTHESIA): Performed by: OPHTHALMOLOGY

## 2020-07-31 PROCEDURE — 6360000002 HC RX W HCPCS: Performed by: NURSE ANESTHETIST, CERTIFIED REGISTERED

## 2020-07-31 DEVICE — LENS IOL SN60WF 19.0D: Type: IMPLANTABLE DEVICE | Site: EYE | Status: FUNCTIONAL

## 2020-07-31 RX ORDER — BRIMONIDINE TARTRATE 2 MG/ML
SOLUTION/ DROPS OPHTHALMIC
Status: COMPLETED | OUTPATIENT
Start: 2020-07-31 | End: 2020-07-31

## 2020-07-31 RX ORDER — FENTANYL CITRATE 50 UG/ML
INJECTION, SOLUTION INTRAMUSCULAR; INTRAVENOUS PRN
Status: DISCONTINUED | OUTPATIENT
Start: 2020-07-31 | End: 2020-07-31 | Stop reason: SDUPTHER

## 2020-07-31 RX ORDER — OFLOXACIN 3 MG/ML
SOLUTION/ DROPS OPHTHALMIC
Status: COMPLETED | OUTPATIENT
Start: 2020-07-31 | End: 2020-07-31

## 2020-07-31 RX ORDER — BALANCED SALT SOLUTION 6.4; .75; .48; .3; 3.9; 1.7 MG/ML; MG/ML; MG/ML; MG/ML; MG/ML; MG/ML
SOLUTION OPHTHALMIC
Status: COMPLETED | OUTPATIENT
Start: 2020-07-31 | End: 2020-07-31

## 2020-07-31 RX ORDER — TETRACAINE HYDROCHLORIDE 5 MG/ML
1 SOLUTION OPHTHALMIC ONCE
Status: COMPLETED | OUTPATIENT
Start: 2020-07-31 | End: 2020-07-31

## 2020-07-31 RX ORDER — LIDOCAINE HYDROCHLORIDE 10 MG/ML
INJECTION, SOLUTION EPIDURAL; INFILTRATION; INTRACAUDAL; PERINEURAL
Status: COMPLETED | OUTPATIENT
Start: 2020-07-31 | End: 2020-07-31

## 2020-07-31 RX ORDER — MIDAZOLAM HYDROCHLORIDE 1 MG/ML
INJECTION INTRAMUSCULAR; INTRAVENOUS PRN
Status: DISCONTINUED | OUTPATIENT
Start: 2020-07-31 | End: 2020-07-31 | Stop reason: SDUPTHER

## 2020-07-31 RX ORDER — ONDANSETRON 2 MG/ML
4 INJECTION INTRAMUSCULAR; INTRAVENOUS
Status: DISCONTINUED | OUTPATIENT
Start: 2020-07-31 | End: 2020-07-31 | Stop reason: HOSPADM

## 2020-07-31 RX ORDER — SODIUM CHLORIDE 0.9 % (FLUSH) 0.9 %
10 SYRINGE (ML) INJECTION PRN
Status: DISCONTINUED | OUTPATIENT
Start: 2020-07-31 | End: 2020-07-31 | Stop reason: HOSPADM

## 2020-07-31 RX ORDER — LABETALOL HYDROCHLORIDE 5 MG/ML
5 INJECTION, SOLUTION INTRAVENOUS EVERY 10 MIN PRN
Status: DISCONTINUED | OUTPATIENT
Start: 2020-07-31 | End: 2020-07-31 | Stop reason: HOSPADM

## 2020-07-31 RX ORDER — PROMETHAZINE HYDROCHLORIDE 25 MG/ML
6.25 INJECTION, SOLUTION INTRAMUSCULAR; INTRAVENOUS
Status: DISCONTINUED | OUTPATIENT
Start: 2020-07-31 | End: 2020-07-31 | Stop reason: HOSPADM

## 2020-07-31 RX ORDER — CIPROFLOXACIN HYDROCHLORIDE 3.5 MG/ML
1 SOLUTION/ DROPS TOPICAL
Status: COMPLETED | OUTPATIENT
Start: 2020-07-31 | End: 2020-07-31

## 2020-07-31 RX ORDER — TETRACAINE HYDROCHLORIDE 5 MG/ML
SOLUTION OPHTHALMIC
Status: COMPLETED | OUTPATIENT
Start: 2020-07-31 | End: 2020-07-31

## 2020-07-31 RX ORDER — SODIUM CHLORIDE 0.9 % (FLUSH) 0.9 %
10 SYRINGE (ML) INJECTION EVERY 12 HOURS SCHEDULED
Status: DISCONTINUED | OUTPATIENT
Start: 2020-07-31 | End: 2020-07-31 | Stop reason: HOSPADM

## 2020-07-31 RX ORDER — SODIUM CHLORIDE 9 MG/ML
INJECTION, SOLUTION INTRAVENOUS CONTINUOUS
Status: DISCONTINUED | OUTPATIENT
Start: 2020-07-31 | End: 2020-07-31 | Stop reason: HOSPADM

## 2020-07-31 RX ADMIN — FENTANYL CITRATE 25 MCG: 50 INJECTION INTRAMUSCULAR; INTRAVENOUS at 08:08

## 2020-07-31 RX ADMIN — Medication 0.5 ML: at 07:16

## 2020-07-31 RX ADMIN — SODIUM CHLORIDE: 9 INJECTION, SOLUTION INTRAVENOUS at 08:08

## 2020-07-31 RX ADMIN — SODIUM CHLORIDE: 9 INJECTION, SOLUTION INTRAVENOUS at 07:26

## 2020-07-31 RX ADMIN — Medication 0.5 ML: at 07:21

## 2020-07-31 RX ADMIN — CIPROFLOXACIN 1 DROP: 3 SOLUTION OPHTHALMIC at 07:21

## 2020-07-31 RX ADMIN — POVIDONE-IODINE: 5 SOLUTION OPHTHALMIC at 07:16

## 2020-07-31 RX ADMIN — TETRACAINE HYDROCHLORIDE 1 DROP: 5 SOLUTION OPHTHALMIC at 07:16

## 2020-07-31 RX ADMIN — CIPROFLOXACIN 1 DROP: 3 SOLUTION OPHTHALMIC at 07:16

## 2020-07-31 RX ADMIN — MIDAZOLAM 1 MG: 1 INJECTION INTRAMUSCULAR; INTRAVENOUS at 08:08

## 2020-07-31 ASSESSMENT — PAIN - FUNCTIONAL ASSESSMENT: PAIN_FUNCTIONAL_ASSESSMENT: 0-10

## 2020-07-31 ASSESSMENT — PAIN DESCRIPTION - DESCRIPTORS: DESCRIPTORS: DISCOMFORT

## 2020-07-31 ASSESSMENT — PAIN SCALES - GENERAL: PAINLEVEL_OUTOF10: 0

## 2020-07-31 NOTE — OP NOTE
Bigg Ochoa    OPERATIVE NOTE    Preoperative Diagnosis: Cataract right eye    Postoperative Diagnosis: Cataract right eye    Procedure: Phacoemulsification with intraocular lens implantation, right eye  Surgeon: Christie Hunt MD    Anesthesia: MAC, topical.    Complications: none    Estimated blood loss: less than 1 ml. Specimens: none    Indications for procedure: The patient is a 79y.o. year old with decreased vision, glare and halos around lights, and trouble with activities of daily living. Examination revealed a visually significant cataract in the right eye. Risks, benefits, and alternatives to surgery were discussed with the patient and the patient elected to proceed with phacoemulsification with lens implantation. Details of the procedure: Following informed consent, the patient was taken to the operating room and placed in the supine position. Monitored anesthesia care was administered. The eye was prepped and draped in the usual sterile fashion using aseptic technique for cataract surgery. A side port incision was made. 1% preservative free lidocaine was injected through the side port incision for topical anesthesia. The eye was filled with viscoelastic and a 2.4 mm keratome blade was used to make a 3-plane clear corneal incision in the temporal cornea. The cystitome was used to make a tear in the anterior capsule and a Utrata forceps was used to make a complete curvilinear capsulorrhexis. The lens was hydrodissected and freely rotated. Phacoemulsification was performed. Irrigation/aspiration was used to remove all cortical material from the capsular bag. The eye was filled with viscoelastic and a foldable posterior chamber intraocular lens was injected into the capsular bag. The lens was rotated to the appropriate axis as needed. Irrigation/aspiration was used to remove all excess viscoelastic.   The eye was pressurized with BSS and the wounds were check for leaks and none were found. The patient had ofloxacin and Alphagan solutions placed on the eye. The patient went to the PACU in excellent condition, having tolerated the procedure well.

## 2020-07-31 NOTE — ANESTHESIA PRE PROCEDURE
[facility] Department of Anesthesiology  Pre-Anesthesia Evaluation/Consultation       Name:  Mily Wong  : 1950  Age:  79 y.o.                                            MRN:  [id number]  Date: 2020           Surgeon: Surgeon(s):  Erin Coates MD    Procedure: Procedure(s):  Phacoemulsification with intraocular lens implant RIGHT   No Known Allergies  Patient Active Problem List   Diagnosis    Hyperlipidemia    CAD, multiple vessel    Von Willebrand disease ([address]    S/P CABG (coronary artery bypass graft)    AI (aortic insufficiency)    Benign essential HTN    Right knee DJD    Primary localized osteoarthrosis, lower leg    Acute ischemic stroke ([address]    Left-sided weakness    Slurred speech    Facial droop due to stroke    Anemia    Paronychia of great toe, left    Sensorineural hearing loss (SNHL) of both ears    Coronary artery disease involving native coronary artery of native heart without angina pectoris     Past Medical History:   Diagnosis Date    [facility] 10/24/2014    Right    Anemia     Arthritis     rt knee    CAD (coronary artery disease) 14    LAD, RCA, Diag - cariothoracic surgery    Colon polyps 2020    Tubular adenoma, Dr. Taisha Bright (hard of hearing)     hermes-lateral hearing aids    HTN (hypertension)     Hyperlipidemia     Hypokalemia     Jaw dislocation     [facility]illebrand disease ([facility]     Walking pneumonia      Past Surgical History:   Procedure Laterality Date    COLONOSCOPY  2020    Dr. Guerrero Tyner 2/3/2020    COLONOSCOPY POLYPECTOMY SNARE/COLD BIOPSY performed by Araseli Ward MD at [address] [place] [address]          JOINT REPLACEMENT Right 10/21/14    right total knee replacement    TONSILLECTOMY AND ADENOIDECTOMY       Social History     Tobacco Use    Smoking status: Never Smoker    Smokeless tobacco: Never Used   Substance Use Topics  Alcohol use: Yes     Alcohol/week: 3.0 standard drinks     Types: 3 Cans of beer per week     Comment: rare    Drug use: No     Medications  No current facility-administered medications on file prior to encounter. Current Outpatient Medications on File Prior to Encounter   Medication Sig Dispense Refill    amLODIPine (NORVASC) 10 MG tablet TAKE 1 TABLET BY MOUTH ONE TIME A DAY  (Patient taking differently: Indications: takes at noon ) 90 tablet 0    metoprolol succinate (TOPROL XL) 100 MG extended release tablet TAKE 1 TABLET BY MOUTH ONE TIME A DAY  (Patient taking differently: Indications: takes noon ) 90 tablet 3    lisinopril (PRINIVIL;ZESTRIL) 40 MG tablet TAKE 1 TABLET BY MOUTH ONE TIME A DAY  (Patient taking differently: Indications: takes at noon ) 90 tablet 3    atorvastatin (LIPITOR) 80 MG tablet Take 1 tablet by mouth daily 90 tablet 3    aspirin 81 MG tablet Take 81 mg by mouth daily.        Current Facility-Administered Medications   Medication Dose Route Frequency Provider Last Rate Last Dose    0.9 % sodium chloride infusion   Intravenous Continuous Alistair Meadows MD        sodium chloride flush 0.9 % injection 10 mL  10 mL Intravenous 2 times per day Alistair Meadows MD        sodium chloride flush 0.9 % injection 10 mL  10 mL Intravenous PRN Alistair Meadows MD        ciprofloxacin (CILOXAN) 0.3 % ophthalmic solution 1 drop  1 drop Right Eye Q5 Brie Sauer MD   1 drop at 20 1459    cyclopentolate 1%, phenylephrine 2.5%, tropicamide 1%, ketorolac 0.5% ophthalmic solution  0.5 mL Right Eye Q5 Min Trip Wong MD   0.5 mL at 20 0716     Vital Signs (Current)   Vitals:    20   BP: (!) 117/54   Pulse: 80   Resp: 14   Temp: 98.8 °F (37.1 °C)   SpO2: 97%     Vital Signs Statistics (for past 48 hrs)     Temp  Av.8 °F (37.1 °C)  Min: 98.8 °F (37.1 °C)   Min taken time: 20  Max: 98.8 °F (37.1 °C)   Max taken time: 20  Pulse  Av Min: [de-identified]   Min taken time: 20 8094  Max: [de-identified]   Max taken time: 20 0712  Resp  Av  Min: 15   Min taken time: 20 2881  Max: 15   Max taken time: 20 0712  BP  Min: 117/54   Min taken time: 20 0712  Max: 117/54   Max taken time: 20 0712  SpO2  Av %  Min: 97 %   Min taken time: 20 2292  Max: 97 %   Max taken time: 20 0712    BP Readings from Last 3 Encounters:   20 (!) 117/54   20 (!) 142/60   20 (!) 100/54     BMI  Body mass index is 29.53 kg/m². Estimated body mass index is 29.53 kg/m² as calculated from the following:    Height as of this encounter: 5' 9\" (1.753 m). Weight as of this encounter: 200 lb (90.7 kg). CBC   Lab Results   Component Value Date    WBC 8.2 2020    RBC 4.76 2020    HGB 15.5 2020    HCT 45.6 2020    MCV 95.8 2020    RDW 13.3 2020     2020     CMP    Lab Results   Component Value Date     2020    K 4.3 2020     2020    CO2 21 2020    BUN 13 2020    CREATININE 0.6 2020    GFRAA >60 2020    AGRATIO 1.9 2020    LABGLOM >60 2020    GLUCOSE 124 2020    PROT 6.6 2020    CALCIUM 9.1 2020    BILITOT 0.6 2020    ALKPHOS 68 2020    AST 10 2020    ALT 10 2020     BMP    Lab Results   Component Value Date     2020    K 4.3 2020     2020    CO2 21 2020    BUN 13 2020    CREATININE 0.6 2020    CALCIUM 9.1 2020    GFRAA >60 2020    LABGLOM >60 2020    GLUCOSE 124 2020     POCGlucose  No results for input(s): GLUCOSE in the last 72 hours.    Coags    Lab Results   Component Value Date    PROTIME 13.2 10/28/2014    INR 1.21 10/28/2014    APTT 36.1      HCG (If Applicable) No results found for: PREGTESTUR, PREGSERUM, HCG, HCGQUANT   ABGs   Lab Results   Component Value Date    PHART 7.389 2014

## 2020-08-03 NOTE — PROGRESS NOTES
C-Difficile admission screening and protocol:     * Admitted with diarrhea? no     *Prior history of C-Diff. In last 3 months? no     *Antibiotic use in the past 6-8 weeks? Yes eye drops     *Prior hospitalization or nursing home in the last month?   no

## 2020-08-03 NOTE — PROGRESS NOTES
[address] time___0820_________        Surgery time_____0950_______    Take the following medications with a sip of water: takes meds at noon     Do not eat or drink anything after 12:00 midnight prior to your surgery. This includes water chewing gum, mints and ice chips. You may brush your teeth and gargle the morning of your surgery, but do not swallow the water     Please see your family doctor/pediatrician for a history and physical and/or concerning medications. H&P 7/29/2020 DARRELL Quinnhernandez Can    Bring any test results/reports from your physicians office. If you are under the care of a heart doctor or specialist doctor, please be aware that you may be asked to them for clearance    You may be asked to stop blood thinners such as Coumadin, Plavix, Fragmin, Lovenox, etc., or any anti-inflammatories such as:  Aspirin, Ibuprofen, Advil, Naproxen prior to your surgery. We also ask that you stop any OTC medications such as fish oil, vitamin E, glucosamine, garlic, Multivitamins, COQ 10, etc.    We ask that you do not smoke 24 hours prior to surgery  We ask that you do not  drink any alcoholic beverages 24 hours prior to surgery     You must make arrangements for a responsible adult to take you home after your surgery. For your safety you will not be allowed to leave alone or drive yourself home. Your surgery will be cancelled if you do not have a ride home. Also for your safety, it is strongly suggested that someone stay with you the first 24 hours after your surgery. A parent or legal guardian must accompany a child scheduled for surgery and plan to stay at the hospital until the child is discharged. Please do not bring other children with you. For your comfort, please wear simple loose fitting clothing to the hospital.  Please do not bring valuables. Wear short sleeve button down shirt or loose fitting shirt.  Bring eye drops or ointment day of surgery. Do not wear any make-up or nail polish on your fingers or toes      For your safety, please do not wear any jewelry or body piercing's on the day of surgery. All jewelry must be removed. If you have dentures, they will be removed before going to operating room. For your convenience, we will provide you with a container. If you wear contact lenses or glasses, they will be removed, please bring a case for them. If you have a living will and a durable power of  for healthcare, please bring in a copy. As part of our patient safety program to minimize surgical site infections, we ask you to do the following:    · Please notify your surgeon if you develop any illness between         now and the  day of your surgery. · This includes a cough, cold, fever, sore throat, nausea,         or vomiting, and diarrhea, etc.  ·  Please notify your surgeon if you experience dizziness, shortness         of breath or blurred vision between now and the time of your surgery. Do not shave your operative site 96 hours prior to surgery. For face and neck surgery, men may use an electric razor 48 hours   prior to surgery. You may shower the night before surgery or the morning of   your surgery with an antibacterial soap. You will need to bring a photo ID and insurance card    [facility] has an onsite pharmacy, would you like to utilize our pharmacy     If you will be staying overnight and use a C-pap machine, please bring   your C-pap to hospital     Our goal is to provide you with excellent care, therefore, visitors will be limited to two(2) in the room at a time so that we may focus on providing this care for you. Please contact pre-admission testing if you have any further questions.                  [facility] phone number:  [phone]  Please note these are generalized instructions for all surgical cases, you may be provided with more specific instructions according to your surgery.

## 2020-08-04 NOTE — PROGRESS NOTES
[address]        Pre-Op Phone Call:     Patient Name: Melanie Hernandez     Telephone Information:   Mobile [phone]     Home phone:  [phone]    Surgery Time:    9:50 AM     Arrival Time:  0820      Left extended Message:  Yes     Message left with: vm     Recent change in health status:  Call MD     Advised of transportation/ policy:  Yes     NPO policy reviewed: Yes vm     Advised to take morning heart/blood pressure medications with sips of water morning of surgery? Yes     Instructed to bring eye drops, photo identification, and insurance card day of surgery? Yes     Advised to wear short sleeved button down shirt (no T-shirt underneath):  Yes     Advised not to wear jewelry, hairpins, or pantyhose day of surgery? Yes     Advised not to wear make-up and to wash face day of surgery?   Yes    Remarks:        Electronically signed by:  Candelaria Fang RN at 8/4/2020 3:21 PM

## 2020-08-05 ENCOUNTER — PREP FOR PROCEDURE (OUTPATIENT)
Dept: OPHTHALMOLOGY | Age: 70
End: 2020-08-05

## 2020-08-05 ENCOUNTER — ANESTHESIA EVENT (OUTPATIENT)
Dept: SURGERY | Age: 70
End: 2020-08-05
Payer: MEDICARE

## 2020-08-05 RX ORDER — SODIUM CHLORIDE 0.9 % (FLUSH) 0.9 %
10 SYRINGE (ML) INJECTION EVERY 12 HOURS SCHEDULED
Status: CANCELLED | OUTPATIENT
Start: 2020-08-05

## 2020-08-05 RX ORDER — CIPROFLOXACIN HYDROCHLORIDE 3.5 MG/ML
1 SOLUTION/ DROPS TOPICAL SEE ADMIN INSTRUCTIONS
Status: CANCELLED | OUTPATIENT
Start: 2020-08-05

## 2020-08-05 RX ORDER — SODIUM CHLORIDE 0.9 % (FLUSH) 0.9 %
10 SYRINGE (ML) INJECTION PRN
Status: CANCELLED | OUTPATIENT
Start: 2020-08-05

## 2020-08-05 RX ORDER — TETRACAINE HYDROCHLORIDE 5 MG/ML
1 SOLUTION OPHTHALMIC ONCE
Status: CANCELLED | OUTPATIENT
Start: 2020-08-05 | End: 2020-08-05

## 2020-08-06 ENCOUNTER — ANESTHESIA (OUTPATIENT)
Dept: SURGERY | Age: 70
End: 2020-08-06
Payer: MEDICARE

## 2020-08-06 ENCOUNTER — HOSPITAL ENCOUNTER (OUTPATIENT)
Age: 70
Setting detail: OUTPATIENT SURGERY
Discharge: HOME | End: 2020-08-06
Attending: OPHTHALMOLOGY | Admitting: OPHTHALMOLOGY
Payer: MEDICARE

## 2020-08-06 VITALS
HEIGHT: 69 IN | RESPIRATION RATE: 18 BRPM | SYSTOLIC BLOOD PRESSURE: 148 MMHG | WEIGHT: 200 LBS | TEMPERATURE: 97.4 F | BODY MASS INDEX: 29.62 KG/M2 | DIASTOLIC BLOOD PRESSURE: 54 MMHG | OXYGEN SATURATION: 95 % | HEART RATE: 66 BPM

## 2020-08-06 VITALS — SYSTOLIC BLOOD PRESSURE: 130 MMHG | DIASTOLIC BLOOD PRESSURE: 56 MMHG | OXYGEN SATURATION: 100 %

## 2020-08-06 PROCEDURE — 3600000004 HC SURGERY LEVEL 4 BASE: Performed by: OPHTHALMOLOGY

## 2020-08-06 PROCEDURE — 2580000003 HC RX 258: Performed by: NURSE ANESTHETIST, CERTIFIED REGISTERED

## 2020-08-06 PROCEDURE — 3700000000 HC ANESTHESIA ATTENDED CARE: Performed by: OPHTHALMOLOGY

## 2020-08-06 PROCEDURE — 2500000003 HC RX 250 WO HCPCS: Performed by: OPHTHALMOLOGY

## 2020-08-06 PROCEDURE — 2709999900 HC NON-CHARGEABLE SUPPLY: Performed by: OPHTHALMOLOGY

## 2020-08-06 PROCEDURE — 6370000000 HC RX 637 (ALT 250 FOR IP): Performed by: OPHTHALMOLOGY

## 2020-08-06 PROCEDURE — 7100000010 HC PHASE II RECOVERY - FIRST 15 MIN: Performed by: OPHTHALMOLOGY

## 2020-08-06 PROCEDURE — V2632 POST CHMBR INTRAOCULAR LENS: HCPCS | Performed by: OPHTHALMOLOGY

## 2020-08-06 PROCEDURE — 3600000014 HC SURGERY LEVEL 4 ADDTL 15MIN: Performed by: OPHTHALMOLOGY

## 2020-08-06 PROCEDURE — 6360000002 HC RX W HCPCS: Performed by: NURSE ANESTHETIST, CERTIFIED REGISTERED

## 2020-08-06 PROCEDURE — 2580000003 HC RX 258: Performed by: ANESTHESIOLOGY

## 2020-08-06 PROCEDURE — 3700000001 HC ADD 15 MINUTES (ANESTHESIA): Performed by: OPHTHALMOLOGY

## 2020-08-06 DEVICE — LENS IOL SN60WF 19.5D: Type: IMPLANTABLE DEVICE | Site: EYE | Status: FUNCTIONAL

## 2020-08-06 RX ORDER — SODIUM CHLORIDE 0.9 % (FLUSH) 0.9 %
10 SYRINGE (ML) INJECTION EVERY 12 HOURS SCHEDULED
Status: DISCONTINUED | OUTPATIENT
Start: 2020-08-06 | End: 2020-08-06 | Stop reason: HOSPADM

## 2020-08-06 RX ORDER — SODIUM CHLORIDE 0.9 % (FLUSH) 0.9 %
10 SYRINGE (ML) INJECTION PRN
Status: DISCONTINUED | OUTPATIENT
Start: 2020-08-06 | End: 2020-08-06 | Stop reason: SDUPTHER

## 2020-08-06 RX ORDER — TETRACAINE HYDROCHLORIDE 5 MG/ML
SOLUTION OPHTHALMIC
Status: COMPLETED | OUTPATIENT
Start: 2020-08-06 | End: 2020-08-06

## 2020-08-06 RX ORDER — OFLOXACIN 3 MG/ML
SOLUTION/ DROPS OPHTHALMIC
Status: COMPLETED | OUTPATIENT
Start: 2020-08-06 | End: 2020-08-06

## 2020-08-06 RX ORDER — TETRACAINE HYDROCHLORIDE 5 MG/ML
1 SOLUTION OPHTHALMIC ONCE
Status: COMPLETED | OUTPATIENT
Start: 2020-08-06 | End: 2020-08-06

## 2020-08-06 RX ORDER — BALANCED SALT SOLUTION 6.4; .75; .48; .3; 3.9; 1.7 MG/ML; MG/ML; MG/ML; MG/ML; MG/ML; MG/ML
SOLUTION OPHTHALMIC
Status: COMPLETED | OUTPATIENT
Start: 2020-08-06 | End: 2020-08-06

## 2020-08-06 RX ORDER — SODIUM CHLORIDE 9 MG/ML
INJECTION, SOLUTION INTRAVENOUS CONTINUOUS
Status: DISCONTINUED | OUTPATIENT
Start: 2020-08-06 | End: 2020-08-06 | Stop reason: HOSPADM

## 2020-08-06 RX ORDER — SODIUM CHLORIDE 0.9 % (FLUSH) 0.9 %
10 SYRINGE (ML) INJECTION PRN
Status: DISCONTINUED | OUTPATIENT
Start: 2020-08-06 | End: 2020-08-06 | Stop reason: HOSPADM

## 2020-08-06 RX ORDER — SODIUM CHLORIDE 0.9 % (FLUSH) 0.9 %
10 SYRINGE (ML) INJECTION EVERY 12 HOURS SCHEDULED
Status: DISCONTINUED | OUTPATIENT
Start: 2020-08-06 | End: 2020-08-06 | Stop reason: SDUPTHER

## 2020-08-06 RX ORDER — LIDOCAINE HYDROCHLORIDE 10 MG/ML
INJECTION, SOLUTION EPIDURAL; INFILTRATION; INTRACAUDAL; PERINEURAL
Status: COMPLETED | OUTPATIENT
Start: 2020-08-06 | End: 2020-08-06

## 2020-08-06 RX ORDER — MIDAZOLAM HYDROCHLORIDE 1 MG/ML
INJECTION INTRAMUSCULAR; INTRAVENOUS PRN
Status: DISCONTINUED | OUTPATIENT
Start: 2020-08-06 | End: 2020-08-06 | Stop reason: SDUPTHER

## 2020-08-06 RX ORDER — SODIUM CHLORIDE 9 MG/ML
INJECTION, SOLUTION INTRAVENOUS CONTINUOUS PRN
Status: DISCONTINUED | OUTPATIENT
Start: 2020-08-06 | End: 2020-08-06 | Stop reason: SDUPTHER

## 2020-08-06 RX ORDER — CIPROFLOXACIN HYDROCHLORIDE 3.5 MG/ML
1 SOLUTION/ DROPS TOPICAL SEE ADMIN INSTRUCTIONS
Status: COMPLETED | OUTPATIENT
Start: 2020-08-06 | End: 2020-08-06

## 2020-08-06 RX ORDER — BRIMONIDINE TARTRATE 2 MG/ML
SOLUTION/ DROPS OPHTHALMIC
Status: COMPLETED | OUTPATIENT
Start: 2020-08-06 | End: 2020-08-06

## 2020-08-06 RX ADMIN — POVIDONE-IODINE 0.1 ML: 5 SOLUTION OPHTHALMIC at 08:37

## 2020-08-06 RX ADMIN — Medication 3 ML: at 08:40

## 2020-08-06 RX ADMIN — TETRACAINE HYDROCHLORIDE 1 DROP: 5 SOLUTION OPHTHALMIC at 08:37

## 2020-08-06 RX ADMIN — Medication 3 ML: at 08:49

## 2020-08-06 RX ADMIN — CIPROFLOXACIN 1 DROP: 3 SOLUTION OPHTHALMIC at 08:37

## 2020-08-06 RX ADMIN — Medication 3 ML: at 08:44

## 2020-08-06 RX ADMIN — MIDAZOLAM 2 MG: 1 INJECTION INTRAMUSCULAR; INTRAVENOUS at 09:27

## 2020-08-06 RX ADMIN — CIPROFLOXACIN 1 DROP: 3 SOLUTION OPHTHALMIC at 08:44

## 2020-08-06 RX ADMIN — SODIUM CHLORIDE: 9 INJECTION, SOLUTION INTRAVENOUS at 09:26

## 2020-08-06 RX ADMIN — SODIUM CHLORIDE: 9 INJECTION, SOLUTION INTRAVENOUS at 08:43

## 2020-08-06 ASSESSMENT — PAIN SCALES - GENERAL
PAINLEVEL_OUTOF10: 0
PAINLEVEL_OUTOF10: 0

## 2020-08-06 ASSESSMENT — PULMONARY FUNCTION TESTS
PIF_VALUE: 0

## 2020-08-06 ASSESSMENT — PAIN - FUNCTIONAL ASSESSMENT: PAIN_FUNCTIONAL_ASSESSMENT: 0-10

## 2020-08-06 NOTE — ANESTHESIA PRE PROCEDURE
[facility] Department of Anesthesiology  Pre-Anesthesia Evaluation/Consultation       Name:  Tee Lopez  : 1950  Age:  79 y.o.                                            MRN:  [id number]  Date: 2020           Surgeon: Surgeon(s):  Chuy Pienda MD    Procedure: Procedure(s):  Phacoemulsification with intraocular lens implant RIGHT   No Known Allergies  Patient Active Problem List   Diagnosis    Hyperlipidemia    CAD, multiple vessel    [address]brand disease ([address]    S/P CABG (coronary artery bypass graft)    AI (aortic insufficiency)    Benign essential HTN    Right knee DJD    Primary localized osteoarthrosis, lower leg    Acute ischemic stroke ([address]    Left-sided weakness    Slurred speech    Facial droop due to stroke    Anemia    Paronychia of great toe, left    Sensorineural hearing loss (SNHL) of both ears    Coronary artery disease involving native coronary artery of native heart without angina pectoris     Past Medical History:   Diagnosis Date    Acute ischemic stroke ([address] 10/24/2014    Right    Anemia     Arthritis     rt knee    CAD (coronary artery disease) 14    LAD, RCA, Diag - cariothoracic surgery    Colon polyps 2020    Tubular adenoma, Dr. Matt Gifford (hard of hearing)     hermes-lateral hearing aids    HTN (hypertension)     Hyperlipidemia     Hypokalemia     Jaw dislocation     Von Willebrand disease ([address]     Walking pneumonia      Past Surgical History:   Procedure Laterality Date    COLONOSCOPY  2020    Dr. Pauly Peck 2/3/2020    COLONOSCOPY POLYPECTOMY SNARE/COLD BIOPSY performed by Hesham Enamorado MD at [facility]      2015    INTRACAPSULAR CATARACT EXTRACTION Right 2020    Phacoemulsification with intraocular lens implant performed by Chuy Pineda MD at [address] [place] [address] Right 10/21/14    right total knee replacement    TONSILLECTOMY AND ADENOIDECTOMY  1955     Social History     Tobacco Use    Smoking status: Never Smoker    Smokeless tobacco: Never Used   Substance Use Topics    Alcohol use:  Yes     Alcohol/week: 3.0 standard drinks     Types: 3 Cans of beer per week     Comment: rare    Drug use: No     Medications  Current Facility-Administered Medications on File Prior to Visit   Medication Dose Route Frequency Provider Last Rate Last Dose    sodium chloride flush 0.9 % injection 10 mL  10 mL Intravenous 2 times per day Leanna Santos MD        sodium chloride flush 0.9 % injection 10 mL  10 mL Intravenous PRN Leanna Santos MD        0.9 % sodium chloride infusion   Intravenous Continuous Leanna Santos MD        ciprofloxacin (CILOXAN) 0.3 % ophthalmic solution 1 drop  1 drop Left Eye See Admin Instructions Augusto Garcia MD        cyclopentolate 1%, phenylephrine 2.5%, tropicamide 1%, ketorolac 0.5% ophthalmic solution  3 mL Ophthalmic See Admin Instructions Augusto Garcia MD        povidone-iodine 5 % ophthalmic solution 0.1 mL  1 drop Left Eye Once Augusto Garcia MD        tetracaine (TETRAVISC) 0.5 % ophthalmic solution 1 drop  1 drop Left Eye Once Augusto Garcia MD         Current Outpatient Medications on File Prior to Visit   Medication Sig Dispense Refill    escitalopram (LEXAPRO) 5 MG tablet Take 1 tablet by mouth daily 90 tablet 1    amLODIPine (NORVASC) 10 MG tablet TAKE 1 TABLET BY MOUTH ONE TIME A DAY  (Patient taking differently: Indications: takes at noon ) 90 tablet 0    metoprolol succinate (TOPROL XL) 100 MG extended release tablet TAKE 1 TABLET BY MOUTH ONE TIME A DAY  (Patient taking differently: Indications: takes noon ) 90 tablet 3    lisinopril (PRINIVIL;ZESTRIL) 40 MG tablet TAKE 1 TABLET BY MOUTH ONE TIME A DAY  (Patient taking differently: Indications: takes at noon ) 90 tablet 3    atorvastatin (LIPITOR) 80 MG tablet Take 1 tablet by mouth daily 90 06/22/2020    AGRATIO 1.9 06/22/2020    LABGLOM >60 06/22/2020    GLUCOSE 124 06/22/2020    PROT 6.6 06/22/2020    CALCIUM 9.1 06/22/2020    BILITOT 0.6 06/22/2020    ALKPHOS 68 06/22/2020    AST 10 06/22/2020    ALT 10 06/22/2020     BMP    Lab Results   Component Value Date     06/22/2020    K 4.3 06/22/2020     06/22/2020    CO2 21 06/22/2020    BUN 13 06/22/2020    CREATININE 0.6 06/22/2020    CALCIUM 9.1 06/22/2020    GFRAA >60 06/22/2020    LABGLOM >60 06/22/2020    GLUCOSE 124 06/22/2020     POCGlucose  No results for input(s): GLUCOSE in the last 72 hours. Coags    Lab Results   Component Value Date    PROTIME 13.2 10/28/2014    INR 1.21 10/28/2014    APTT 36.1 04/91/4325     HCG (If Applicable) No results found for: Kady Shutters, HCG, HCGQUANT   ABGs   Lab Results   Component Value Date    PHART 7.389 06/03/2014    PO2ART 69 06/03/2014    HDI6GFY 41 06/03/2014    KRZ9AXQ 24.8 06/03/2014    BEART 0 06/03/2014    S5FDZBFM 93 06/03/2014      Type & Screen (If Applicable)  No results found for: LABABO, LABRH                         BMI: Wt Readings from Last 3 Encounters:       NPO Status:  >8h                          Anesthesia Evaluation  Patient summary reviewed no history of anesthetic complications:   Airway: Mallampati: III  TM distance: >3 FB   Neck ROM: full   Dental:    (+) partials      Pulmonary:normal exam                               Cardiovascular:  Exercise tolerance: good (>4 METS),   (+) hypertension:, valvular problems/murmurs: AI, CAD (EF 50):, CABG/stent (CABG 4 years ago):,       ECG reviewed  Rhythm: regular  Rate: normal           Beta Blocker:  Dose within 24 Hrs         Neuro/Psych:   (+) CVA (no residual sx per pt):,             GI/Hepatic/Renal: Neg GI/Hepatic/Renal ROS            Endo/Other:    (+) blood dyscrasia: VWd:., .                 Abdominal:           Vascular: negative vascular ROS.                                          Anesthesia Plan      MAC

## 2020-08-06 NOTE — ANESTHESIA POSTPROCEDURE EVALUATION
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        Anesthesia Post Evaluation    Patient location during evaluation: PACU  Patient participation: complete - patient participated  Level of consciousness: awake and alert  Pain score: 2  Airway patency: patent  Nausea & Vomiting: no nausea and no vomiting  Complications: no  Cardiovascular status: blood pressure returned to baseline  Respiratory status: acceptable  Hydration status: euvolemic

## 2020-08-06 NOTE — OP NOTE
Savita Turner    OPERATIVE NOTE    Preoperative Diagnosis: Cataract left eye    Postoperative Diagnosis: Cataract left eye    Procedure: Phacoemulsification with intraocular lens implantation, left eye  Surgeon: Lamont Tai MD    Anesthesia: MAC, topical.    Complications: none    Estimated blood loss: less than 1 ml. Specimens: none    Indications for procedure: The patient is a 79y.o. year old with decreased vision, glare and halos around lights, and trouble with activities of daily living. Examination revealed a visually significant cataract in the left eye. Risks, benefits, and alternatives to surgery were discussed with the patient and the patient elected to proceed with phacoemulsification with lens implantation. Details of the procedure: Following informed consent, the patient was taken to the operating room and placed in the supine position. Monitored anesthesia care was administered. The eye was prepped and draped in the usual sterile fashion using aseptic technique for cataract surgery. A side port incision was made. 1% preservative free lidocaine was injected through the side port incision for topical anesthesia. The eye was filled with viscoelastic and a 2.4 mm keratome blade was used to make a 3-plane clear corneal incision in the temporal cornea. The cystitome was used to make a tear in the anterior capsule and a Utrata forceps was used to make a complete curvilinear capsulorrhexis. The lens was hydrodissected and freely rotated. Phacoemulsification was performed. Irrigation/aspiration was used to remove all cortical material from the capsular bag. The eye was filled with viscoelastic and a foldable posterior chamber intraocular lens was injected into the capsular bag. The lens was rotated to the appropriate axis as needed. Irrigation/aspiration was used to remove all excess viscoelastic.   The eye was pressurized with BSS and the wounds were check for leaks and none were found.  The patient had ofloxacin and Alphagan solutions placed on the eye. The patient went to the PACU in excellent condition, having tolerated the procedure well.

## 2020-08-17 ENCOUNTER — VIRTUAL VISIT (OUTPATIENT)
Dept: INTERNAL MEDICINE CLINIC | Age: 70
End: 2020-08-17
Payer: MEDICARE

## 2020-08-17 PROCEDURE — 99441 PR PHYS/QHP TELEPHONE EVALUATION 5-10 MIN: CPT | Performed by: INTERNAL MEDICINE

## 2020-08-17 RX ORDER — ESCITALOPRAM OXALATE 5 MG/1
10 TABLET ORAL DAILY
Qty: 90 TABLET | Refills: 1 | Status: SHIPPED | OUTPATIENT
Start: 2020-08-17 | End: 2020-09-08 | Stop reason: SDUPTHER

## 2020-08-17 NOTE — PROGRESS NOTES
Hector Ace is a 79 y.o. male  evaluated via telephone on 8/17/2020        [facility]) Physicians  Internal Medicine  Patient Encounter  Aliyah Parson D.O., [place]     Consent:  She and/or health care decision maker is aware that that she may receive a bill for this telephone service, depending on her insurance coverage, and has provided verbal consent to proceed: Yes      Documentation:    Chief Complaint   Patient presents with    Follow-up         79 y.o. male being evaluated via a phone consultation visit due to the coronavirus pandemic emergency and public health crisis and inability to see the patient face-to-face in the office. Pt had his cataract surgery. He is doing well. He is pleased with the progress. His next quest is his hearing. He was recently started on Lexapro. He states he feels better. His mood is better. He is coping better. He does still feel lonely. He is pleased with how much improvement he has had. Past Medical History:   Diagnosis Date    Acute ischemic stroke ([facility] [address] 10/24/2014    Right    Anemia     Arthritis     rt knee    CAD (coronary artery disease) 5/29/14    LAD, RCA, Diag - cariothoracic surgery    Colon polyps 02/03/2020    Tubular adenoma, Dr. Helena Lewis (hard of hearing)     hermes-lateral hearing aids    HTN (hypertension)     Hyperlipidemia     Hypokalemia     Jaw dislocation     Von Willebrand disease ([facility] [address]     Walking pneumonia           Prior to Visit Medications    Medication Sig Taking?  Authorizing Provider   escitalopram (LEXAPRO) 5 MG tablet Take 1 tablet by mouth daily Yes Albert Lopez DO   amLODIPine (NORVASC) 10 MG tablet TAKE 1 TABLET BY MOUTH ONE TIME A DAY   Patient taking differently: Indications: takes at noon  Yes Albert Lopez,    metoprolol succinate (TOPROL XL) 100 MG extended release tablet TAKE 1 TABLET BY MOUTH ONE TIME A DAY   Patient taking differently: Indications: takes noon  Yes Milka Arce MD

## 2020-08-31 ENCOUNTER — OFFICE VISIT (OUTPATIENT)
Dept: INTERNAL MEDICINE CLINIC | Age: 70
End: 2020-08-31
Payer: MEDICARE

## 2020-08-31 VITALS
HEART RATE: 60 BPM | WEIGHT: 199 LBS | TEMPERATURE: 98 F | SYSTOLIC BLOOD PRESSURE: 120 MMHG | RESPIRATION RATE: 12 BRPM | DIASTOLIC BLOOD PRESSURE: 60 MMHG | BODY MASS INDEX: 29.39 KG/M2

## 2020-08-31 LAB
BILIRUBIN, POC: ABNORMAL
BLOOD URINE, POC: ABNORMAL
CLARITY, POC: ABNORMAL
COLOR, POC: ABNORMAL
GLUCOSE URINE, POC: ABNORMAL
KETONES, POC: ABNORMAL
LEUKOCYTE EST, POC: ABNORMAL
NITRITE, POC: ABNORMAL
PH, POC: 6
PROTEIN, POC: ABNORMAL
SPECIFIC GRAVITY, POC: 1
UROBILINOGEN, POC: ABNORMAL

## 2020-08-31 PROCEDURE — 99214 OFFICE O/P EST MOD 30 MIN: CPT | Performed by: INTERNAL MEDICINE

## 2020-08-31 PROCEDURE — 93000 ELECTROCARDIOGRAM COMPLETE: CPT | Performed by: INTERNAL MEDICINE

## 2020-08-31 PROCEDURE — 81002 URINALYSIS NONAUTO W/O SCOPE: CPT | Performed by: INTERNAL MEDICINE

## 2020-08-31 RX ORDER — POLYETHYLENE GLYCOL 3350 17 G/17G
17 POWDER, FOR SOLUTION ORAL DAILY
Qty: 1530 G | Refills: 0 | Status: SHIPPED | OUTPATIENT
Start: 2020-08-31 | End: 2020-09-30

## 2020-08-31 NOTE — PROGRESS NOTES
[facility]) Physicians  Internal Medicine  Patient Encounter  Gina Li D.O., [facility]        Chief Complaint   Patient presents with    GI Problem       HPI: 79 y.o. male seen urgently today with complaint of an upper abd ache. Symptoms started about 10 days ago. He thought he pulled a muscle, but he did not sustain an injury. The pain was initially located in the left upper quadrant of the abd. He has had discomfort every day, but the pain has been intermittent. The pan does not radiate into the back. He denies exacerbating factors. He feels better with eating. Tums did not help the discomfort. The ache can occur across the upper abd on both sides. The left side is the worse. He denies N/V, SOB, sweats, diarrhea. In the last few days his BM's have been noticeably more difficult. He denies melena or hematochezia. Stools have been drier and harder. Pain is mostly when he is sitting around. Each episode can last 20 to 30 minutes. he denies any exertional chest pain or shortness of breath. He denies any palpitations, dizziness, lightheadedness, syncope. He is able to mow the grass without difficulty. Symptoms are distinctly different than his anginal symptoms in 2014 when he was experiencing chest pressure.     Past Medical History:   Diagnosis Date    Acute ischemic stroke ([address] 10/24/2014    Right    Anemia     Arthritis     rt knee    CAD (coronary artery disease) 5/29/14    LAD, RCA, Diag - cariothoracic surgery    Colon polyps 02/03/2020    Tubular adenoma, Dr. Crystal Membreno (hard of hearing)     hermes-lateral hearing aids    HTN (hypertension)     Hyperlipidemia     Hypokalemia     Jaw dislocation     Von Willebrand disease ([address]     Walking pneumonia          MEDICATIONS:  Medication Sig   escitalopram (LEXAPRO) 5 MG tablet Take 2 tablets by mouth daily   amLODIPine (NORVASC) 10 MG tablet TAKE 1 TABLET BY MOUTH ONE TIME A DAY   Patient taking differently: Indications: takes at noon    metoprolol succinate (TOPROL XL) 100 MG extended release tablet TAKE 1 TABLET BY MOUTH ONE TIME A DAY   Patient taking differently: Indications: takes noon    lisinopril (PRINIVIL;ZESTRIL) 40 MG tablet TAKE 1 TABLET BY MOUTH ONE TIME A DAY   Patient taking differently: Indications: takes at noon    atorvastatin (LIPITOR) 80 MG tablet Take 1 tablet by mouth daily   aspirin 81 MG tablet Take 81 mg by mouth daily. Review of Systems - As per HPI      OBJECTIVE:  /60   Pulse 60   Temp 98 °F (36.7 °C)   Resp 12   Wt 199 lb (90.3 kg)   BMI 29.39 kg/m²   GEN: NAD, A&O, Non-toxic  HEENT: NC/AT, SALLY, EOMI, Oral cavity Clear,  TM's NL, Nasal cavity clear. NECK: Supple. No thyromegaly. No JVD  LYMPH: No C/SC nodes. CV: S1 S2 NL, RRR. No murmurs, clicks or rubs. PULM: CTA, symmentric air exchange  EXT: No C/C/E  GI: Abdomen is soft,  BS+, No hepatomegaly. No masses. He does have some minimal reproducible tenderness to palpation in the left upper quadrant. No pulsatile masses. NEURO: No focal or lateralizing deficits. VASC:  No carotid bruits. Pulses symmetric  SKIN:  No rashes or lesions of concern  MS: Costal margins are nontender. No CVA tenderness. UA--trace ketones. Specific gravity 1.005    EKG- normal sinus rhythm with no ischemic changes. LVH    ASSESSMENT[de-identified]  Lakeisha Michael was seen today for gi problem. Diagnoses and all orders for this visit:    Upper abdominal pain  -     XR ABDOMEN (KUB) (SINGLE AP VIEW); Future  -     EKG 12 Lead  -     POCT Urinalysis no Micro    Change in bowel habit  -     XR ABDOMEN (KUB) (SINGLE AP VIEW); Future  -     polyethylene glycol (GLYCOLAX) 17 GM/SCOOP powder; Take 17 g by mouth daily    Hard stool  -     XR ABDOMEN (KUB) (SINGLE AP VIEW); Future  -     polyethylene glycol (GLYCOLAX) 17 GM/SCOOP powder; Take 17 g by mouth daily      Symptoms started in the last 10 days.   This upper abdominal discomfort does not sound anginal.  It is nonexertional and has also been associated with a change in his bowel habits. Additional Plan:  1. If symptoms persist, may need CT scan of the abdomen as well as further cardiac evaluation. 2.  Push fluids  3. Advised patient to call should symptoms persist, worsen or if new symptoms develop and certainly proceed to the ER should symptoms worsen. Discussed medications with patient who voiced understanding of their use, indication and potential side effects. Pt also understands the above recommendations. All questions answered. This note was generated completely or in part utilizing Dragon dictation speech recognition software. Occasionally, words are mistranscribed and despite editing, the text may contain inaccuracies due to incorrect word recognition.   If further clarification is needed please contact the office at [phone]       Electronically signed    Endy Downs D.O.

## 2020-08-31 NOTE — PATIENT INSTRUCTIONS
To do list    #1 we are starting MiraLAX 17 g mixed in 8 ounces of water daily to help move the bowels better. #2 continue to monitor for symptoms that occur with exertion. This may clue us in that symptoms may be cardiac in nature. #3 push fluids    #4 go to the emergency room should symptoms recur and persist or if severity worsens    #5 call in 1 week with report or sooner if needed      Patient Education        Abdominal Pain: Care Instructions  Your Care Instructions     Abdominal pain has many possible causes. Some aren't serious and get better on their own in a few days. Others need more testing and treatment. If your pain continues or gets worse, you need to be rechecked and may need more tests to find out what is wrong. You may need surgery to correct the problem. Don't ignore new symptoms, such as fever, nausea and vomiting, urination problems, pain that gets worse, and dizziness. These may be signs of a more serious problem. Your doctor may have recommended a follow-up visit in the next 8 to 12 hours. If you are not getting better, you may need more tests or treatment. The doctor has checked you carefully, but problems can develop later. If you notice any problems or new symptoms, get medical treatment right away. Follow-up care is a key part of your treatment and safety. Be sure to make and go to all appointments, and call your doctor if you are having problems. It's also a good idea to know your test results and keep a list of the medicines you take. How can you care for yourself at home? · Rest until you feel better. · To prevent dehydration, drink plenty of fluids, enough so that your urine is light yellow or clear like water. Choose water and other caffeine-free clear liquids until you feel better. If you have kidney, heart, or liver disease and have to limit fluids, talk with your doctor before you increase the amount of fluids you drink.   · If your stomach is upset, eat mild foods, such as rice, dry toast or crackers, bananas, and applesauce. Try eating several small meals instead of two or three large ones. · Wait until 48 hours after all symptoms have gone away before you have spicy foods, alcohol, and drinks that contain caffeine. · Do not eat foods that are high in fat. · Avoid anti-inflammatory medicines such as aspirin, ibuprofen (Advil, Motrin), and naproxen (Aleve). These can cause stomach upset. Talk to your doctor if you take daily aspirin for another health problem. When should you call for help? [id number] anytime you think you may need emergency care. For example, call if:  · You passed out (lost consciousness). · You pass maroon or very bloody stools. · You vomit blood or what looks like coffee grounds. · You have new, severe belly pain. Call your doctor now or seek immediate medical care if:  · Your pain gets worse, especially if it becomes focused in one area of your belly. · You have a new or higher fever. · Your stools are black and look like tar, or they have streaks of blood. · You have unexpected vaginal bleeding. · You have symptoms of a urinary tract infection. These may include:  ? Pain when you urinate. ? Urinating more often than usual.  ? Blood in your urine. · You are dizzy or lightheaded, or you feel like you may faint. Watch closely for changes in your health, and be sure to contact your doctor if:  · You are not getting better after 1 day (24 hours). Where can you learn more? Go to [email or web address] and sign in to your [employer] ([employer]) account. Enter P838 in the [employer] box to learn more about \"Abdominal Pain: Care Instructions. \"     If you do not have an account, please click on the \"Sign Up Now\" link. Current as of: June 26, 2019               Content Version: 12.5  © 4669-8872 Healthwise, Incorporated. Care instructions adapted under license by [facility] ([facility]).  If you have questions about a medical condition or this instruction, always ask your healthcare professional. [place] [address] any warranty or liability for your use of this information.

## 2020-09-01 ENCOUNTER — HOSPITAL ENCOUNTER (OUTPATIENT)
Age: 70
Discharge: HOME | End: 2020-09-01
Payer: MEDICARE

## 2020-09-01 ENCOUNTER — HOSPITAL ENCOUNTER (OUTPATIENT)
Dept: GENERAL RADIOLOGY | Age: 70
Discharge: HOME | End: 2020-09-01
Payer: MEDICARE

## 2020-09-01 PROCEDURE — 74018 RADEX ABDOMEN 1 VIEW: CPT

## 2020-09-08 RX ORDER — ESCITALOPRAM OXALATE 10 MG/1
10 TABLET ORAL DAILY
Qty: 30 TABLET | Refills: 3 | Status: SHIPPED | OUTPATIENT
Start: 2020-09-08 | End: 2020-12-31

## 2020-09-08 NOTE — TELEPHONE ENCOUNTER
----- Message from Demetrius Whitfield sent at 9/8/2020 12:36 PM EDT -----  Subject: Message to Provider    QUESTIONS  Information for Provider? pt is calling to update of how he is doing   pt is doing well he said . pt did mention he ran out of the script for 5   mg and wants to know if the one for 10 mg has been called in yet please   call to advise   ---------------------------------------------------------------------------  --------------  CALL BACK INFO  What is the best way for the office to contact you? OK to leave message on   voicemail  Preferred Call Back Phone Number? [phone]  ---------------------------------------------------------------------------  --------------  SCRIPT ANSWERS  Relationship to Patient?  Self

## 2020-09-08 NOTE — TELEPHONE ENCOUNTER
Message does not state what medication he is requesting. But it looks like dose of Lexapro changed to 10mg. No script sent. Please send to pharmacy on file.

## 2020-09-25 ENCOUNTER — HOSPITAL ENCOUNTER (OUTPATIENT)
Dept: INFUSION THERAPY | Age: 70
Setting detail: INFUSION SERIES
Discharge: HOME | End: 2020-09-25
Payer: MEDICARE

## 2020-09-25 VITALS
SYSTOLIC BLOOD PRESSURE: 146 MMHG | HEIGHT: 69 IN | HEART RATE: 73 BPM | TEMPERATURE: 98.6 F | BODY MASS INDEX: 29.39 KG/M2 | WEIGHT: 198.41 LBS | DIASTOLIC BLOOD PRESSURE: 57 MMHG | RESPIRATION RATE: 17 BRPM | OXYGEN SATURATION: 95 %

## 2020-09-25 DIAGNOSIS — D68.00 VON WILLEBRAND DISEASE: Primary | ICD-10-CM

## 2020-09-25 PROCEDURE — 96365 THER/PROPH/DIAG IV INF INIT: CPT

## 2020-09-25 PROCEDURE — 2580000003 HC RX 258: Performed by: INTERNAL MEDICINE

## 2020-09-25 PROCEDURE — 6360000002 HC RX W HCPCS: Performed by: INTERNAL MEDICINE

## 2020-09-25 RX ORDER — SODIUM CHLORIDE 9 MG/ML
INJECTION, SOLUTION INTRAVENOUS CONTINUOUS
Status: CANCELLED | OUTPATIENT
Start: 2020-09-25

## 2020-09-25 RX ORDER — EPINEPHRINE 1 MG/ML
0.3 INJECTION, SOLUTION, CONCENTRATE INTRAVENOUS PRN
Status: CANCELLED | OUTPATIENT
Start: 2020-09-25

## 2020-09-25 RX ORDER — SODIUM CHLORIDE 0.9 % (FLUSH) 0.9 %
10 SYRINGE (ML) INJECTION PRN
Status: DISCONTINUED | OUTPATIENT
Start: 2020-09-25 | End: 2020-09-26 | Stop reason: HOSPADM

## 2020-09-25 RX ORDER — SODIUM CHLORIDE 0.9 % (FLUSH) 0.9 %
10 SYRINGE (ML) INJECTION PRN
Status: CANCELLED | OUTPATIENT
Start: 2020-09-25

## 2020-09-25 RX ORDER — METHYLPREDNISOLONE SODIUM SUCCINATE 125 MG/2ML
125 INJECTION, POWDER, LYOPHILIZED, FOR SOLUTION INTRAMUSCULAR; INTRAVENOUS ONCE
Status: CANCELLED | OUTPATIENT
Start: 2020-09-25

## 2020-09-25 RX ORDER — DIPHENHYDRAMINE HYDROCHLORIDE 50 MG/ML
50 INJECTION INTRAMUSCULAR; INTRAVENOUS ONCE
Status: CANCELLED | OUTPATIENT
Start: 2020-09-25

## 2020-09-25 RX ADMIN — Medication 10 ML: at 09:28

## 2020-09-25 RX ADMIN — Medication 10 ML: at 08:43

## 2020-09-25 RX ADMIN — DESMOPRESSIN ACETATE 27 MCG: 4 SOLUTION INTRAVENOUS at 09:09

## 2020-09-25 NOTE — PROGRESS NOTES
Patient here for DDAVP infusion for dental procedure at 11:00  Has mild von Willenbrand factor. Vital signs stable. Patient to go to dentist office from here. Instructed on possible side effects of headache, nausea and flushing. /50 72 17 after infusion.

## 2020-09-30 RX ORDER — AMLODIPINE BESYLATE 10 MG/1
TABLET ORAL
Qty: 90 TABLET | Refills: 0 | Status: SHIPPED | OUTPATIENT
Start: 2020-09-30 | End: 2020-12-31

## 2020-10-28 NOTE — PROGRESS NOTES
[address]      Cardiology Consult    Smith Mention  1950 October 30, 2020    Primary Physician: Dr. Yolande Lomax  Reason for Visit: CAD s/p CABG    CC: \"Nothing new. \"     HPI:  The patient is 79 y.o. male with [place] A[place] disease and CAD s/p CABG, hypertension, and hyperlipidemia who presents for management of CAD. He originally presented with typical exertional angina chest pain. Angiogram showed multivessel disease and the patient had bypass surgery. His post-operative course was uncomplicated. Later, he had a total right knee replacement which was complicated by a small CVA. He denies any residual deficit from the CVA. Today, he states he is doing well and denies any new cardiac sounding complaints. He denies any chest pains or worsening shortness of breath. He reports compliance with his medications and tolerating. He does not routinely monitor his blood pressure at home. Patient denies exertional chest pain/pressure, dyspnea at rest, worsening KING, PND, orthopnea, palpitations, lightheadedness, weight changes, changes in LE edema, and syncope.     Past Medical History:   Diagnosis Date    Acute ischemic stroke ([address] 10/24/2014    Right    Anemia     Arthritis     rt knee    CAD (coronary artery disease) 5/29/14    LAD, RCA, Diag - cariothoracic surgery    Colon polyps 02/03/2020    Tubular adenoma, Dr. Johnny Salgado (hard of hearing)     hermes-lateral hearing aids    HTN (hypertension)     Hyperlipidemia     Hypokalemia     Jaw dislocation     Von Willebrand disease ([address]     Walking pneumonia      Past Surgical History:   Procedure Laterality Date    COLONOSCOPY  02/03/2020    Dr. Jalil Salazar N/A 2/3/2020    COLONOSCOPY POLYPECTOMY SNARE/COLD BIOPSY performed by Peter Ramirez MD at [facility]      2015    INTRACAPSULAR CATARACT EXTRACTION Right 7/31/2020    Phacoemulsification with intraocular lens implant performed by Monie May MD at [place] [address] Left 8/6/2020    Phacoemulsification with intraocular lens implant performed by Monie May MD at [facility] Right 10/21/14    right total knee replacement    TONSILLECTOMY AND ADENOIDECTOMY  1955     Family History   Problem Relation Age of Onset    Other Mother 35        blood clot    High Blood Pressure Father     Heart Disease Father 54        CAD, CABG    Clotting Disorder Sister         Von Willebrand's     Social History     Tobacco Use    Smoking status: Never Smoker    Smokeless tobacco: Never Used   Substance Use Topics    Alcohol use: Yes     Alcohol/week: 3.0 standard drinks     Types: 3 Cans of beer per week     Comment: rare    Drug use: No     No Known Allergies  Current Outpatient Medications   Medication Sig Dispense Refill    amLODIPine (NORVASC) 10 MG tablet TAKE 1 TABLET BY MOUTH ONE TIME A DAY  90 tablet 0    escitalopram (LEXAPRO) 10 MG tablet Take 1 tablet by mouth daily 30 tablet 3    metoprolol succinate (TOPROL XL) 100 MG extended release tablet TAKE 1 TABLET BY MOUTH ONE TIME A DAY  (Patient taking differently: Indications: takes noon ) 90 tablet 3    lisinopril (PRINIVIL;ZESTRIL) 40 MG tablet TAKE 1 TABLET BY MOUTH ONE TIME A DAY  (Patient taking differently: Indications: takes at noon ) 90 tablet 3    atorvastatin (LIPITOR) 80 MG tablet Take 1 tablet by mouth daily 90 tablet 3    aspirin 81 MG tablet Take 81 mg by mouth daily. No current facility-administered medications for this visit. Review of Systems:  · Constitutional: No unanticipated weight loss. There's been no change in energy level, sleep pattern, or activity level. No fevers, chills. · Eyes: No visual changes or diplopia. No scleral icterus. · ENT: No Headaches, hearing loss or vertigo. No mouth sores or sore throat.   · Cardiovascular: as reviewed in HPI  · Respiratory: No cough or wheezing, no sputum production. No hemoptysis. · Gastrointestinal: No abdominal pain, appetite loss, blood in stools. No change in bowel or bladder habits. · Genitourinary: No dysuria, trouble voiding, or hematuria. · Musculoskeletal:  No gait disturbance, no joint complaints. · Integumentary: No rash or pruritis. · Neurological: No headache, diplopia, change in muscle strength, numbness or tingling. · Psychiatric: No anxiety or depression. · Endocrine: No temperature intolerance. No excessive thirst, fluid intake, or urination. No tremor. · Hematologic/Lymphatic: No abnormal bruising or bleeding, blood clots or swollen lymph nodes. · Allergic/Immunologic: No nasal congestion or hives. Physical Exam:   BP (!) 144/62   Pulse 68   Temp 97.5 °F (36.4 °C)   Ht 5' 9\" (1.753 m)   Wt 197 lb 3.2 oz (89.4 kg) Comment: with swhoes  SpO2 98%   BMI 29.12 kg/m²   Wt Readings from Last 3 Encounters:   10/30/20 197 lb 3.2 oz (89.4 kg)   09/25/20 198 lb 6.6 oz (90 kg)   08/31/20 199 lb (90.3 kg)     Constitutional: He is oriented to person, place, and time. He appears well-developed and well-nourished. In no acute distress. Head: Normocephalic and atraumatic. Pupils equal and round. Neck: Neck supple. No JVP or carotid bruit appreciated. No mass and no thyromegaly present. No lymphadenopathy present. Cardiovascular: Normal rate, normal heart sounds and intact distal pulses. Exam reveals no gallop and no friction rub. II/VI diastolic murmur heard. Pulmonary/Chest: Effort normal and breath sounds normal. No respiratory distress. He has no wheezes, rhonchi or rales. Well healed median sternotomy scar. Abdominal: Soft, non-tender. Bowel sounds are normal. He exhibits no organomegaly, mass or bruit. Extremities: No edema. No cyanosis or clubbing. Pulses are 2+ radial/dorsalis pedis and posterior tibial bilaterally. S/p R-TKR.   Neurological: He is alert and oriented to person, place, and time. No gross cranial nerve deficit. Coordination normal.   Skin: Skin is warm and dry. There is no rash or diaphoresis. Psychiatric: He has a normal mood and affect. His speech is normal and behavior is normal.     Lab Review:   FLP:    Lab Results   Component Value Date    TRIG 60 06/22/2020    HDL 47 06/22/2020    LDLCALC 79 06/22/2020    LABVLDL 12 06/22/2020     BUN/Creatinine:    Lab Results   Component Value Date    BUN 13 06/22/2020    CREATININE 0.6 06/22/2020     EKG Interpretation: 5/16/14 Sinus rhythm. LVH with QRS widening. 10/11/18 Sinus  Bradycardia. Voltage criteria for LVH. IVCD  10/17/19 Sinus rhythm. Voltage criteria for LVH. IVCD. Image Review:   Lexiscan: 6/2014  1) Abnormal study with a reversible defect of the apical anterior wall and apex consistent with ischemia. There is also a fixed defect of the basal to mid inferior wall. 2) The LV is severely dilated with moderately reduced systolic function (EF 17%). There is moderate diffuse hypokinesis and severe hypokinesis of the apex. 3) The ECG portion of the stress test is abnormal with significant ST depression which occurs late into recovery. 4) Uncontrolled hypertension. Echo 5/20/14  Normal left ventricle size. Concentric left ventricular hypertrophy. Normal systolic function with an estimated ejection fraction of 50-55%. No regional wall motion abnormalities are seen. Diastolic dysfunction is present. Trivial mitral regurgitation is present. Mild aortic regurgitation. Trivial tricuspid regurgitation. Cath: 5/29/14   1. The left main trifurcates into a left anterior descending, a ramus intermedius and a left circumflex artery. The left main was angiographically free of significant disease. 2. The left anterior descending is a normal caliber vessel that follows a normal anatomical course. There was a complex bifurcating lesion in the proximal segment that involved the first diagonal branch.  Again in this proximal segment at the level of the diagonal branch there was a 90% stenosis as well as a 90% stenosis immediately after the diagonal branch. The diagonal branch itself is a large bifurcating vessel; the upper limb of the vessel appears angiographically free of significant disease. The lower pole   of this bifurcating left anterior descending has sequential 95% stenoses. The remainder of the mid LAD and distal LAD have only minimal disease but no focal obstructive defects. 3. The ramus intermedius is a large caliber vessel. There is a 30% to 40% stenosis in the proximal segment of this vessel. 4. The left circumflex artery is a moderate size vessel. There is 1 small-to-moderate size obtuse marginal branch. The left circumflex and obtuse marginal have mild luminal irregularities but no significant obstructive defects. 5. The right coronary artery is a large dominant vessel supplying the PDA. The right coronary artery has a focal 95% stenosis in the proximal segment. CABG: LIMA->LAD, SVG->D2->D1, SVG->distal RCA. Echo 10/27/14  Extremely poor quality images. Definity contrast used to visualize left ventricle. Normal left ventricular size and systolic function. LV ejection fraction is visually estimated to be 50%. Normal right ventricular size and function. The valvular structures are not well seen but there are no significant valvular abnormalities by Doppler. No intracardiac source for embolus identified. Echo 7/11/2017  Left ventricle size is normal. Mild concentric left ventricular hypertrophy is present. Global ejection fraction is mildly decreased and estimated at approximately 45 to 50%. No regional wall motion abnormalities are noted. Normal diastolic function. The left atrium is mildly dilated. The aortic valve is mildly thickened, opens well with normal gradients. Moderate aortic regurgitation is present. There is mild tricuspid regurgitation with RVSP estimated at 33 mmHg.  Estimated right atrial pressure is 5 mmHg. Echo 11/1/19  Concentric LVH with normal LV size and wall motion. EF is  50-55%. The left atrium is dilated. Mild Mitral, Aortic and Tricuspid Regurgitation. Normal right ventricular size and function. Doppler derived Pulmonary artery systolic pressure (PASP) is  34 mmHg . Assessment/Plan:   1) CAD s/p CABG. Asymptomatic. Continue with medical management and risk factor modification including aspirin, statin, and B-blocker. Continue Toprol  mg daily. 2) Essential hypertension. Typically controlled, but elevated today. Goal BP <130/80. Continue current medical therapy. Instructed to monitor blood pressure at home and call if it remains elevated; would start HCTZ 25mg if remains elevated. 3) Hyperlipidemia. LDL 79, continue Lipitor to 80 mg daily. 4) Aortic insufficiency. Continue to monitor clinically and last echo 11/2019 showed mild aortic regurgiation. No murmur heard. 5) Von Willebrand disease. Tolerating ASA 81mg daily. 6) CVA. Continue ASA and statin. Follow up in 1 year. Thank you very much for allowing me to participate in the care of your patient. Please do not hesitate to contact me if you have any questions. Sincerely,  Jose Roberto Chambers. Janice Bhatti, [address] [place] [address]  Ph: [phone]  Fax: [phone]       This note was scribed in the presence of Dr Janice Bhatti MD by Nichelle Fajardo RN. Physician Attestation: The scribes documentation has been prepared under my direction and personally reviewed by me in its entirety. I confirm that the note above accurately reflects all work, treatment, procedures, and medical decision making performed by me. All portions of the note including but not limited to the chief complaint, history of present illness, physical exam, assessment and plan/medical decision making were personally reviewed, edited, and updated on the day of the visit.

## 2020-10-30 ENCOUNTER — OFFICE VISIT (OUTPATIENT)
Dept: CARDIOLOGY CLINIC | Age: 70
End: 2020-10-30
Payer: MEDICARE

## 2020-10-30 VITALS
SYSTOLIC BLOOD PRESSURE: 144 MMHG | HEIGHT: 69 IN | WEIGHT: 197.2 LBS | HEART RATE: 68 BPM | OXYGEN SATURATION: 98 % | TEMPERATURE: 97.5 F | DIASTOLIC BLOOD PRESSURE: 62 MMHG | BODY MASS INDEX: 29.21 KG/M2

## 2020-10-30 PROCEDURE — 99214 OFFICE O/P EST MOD 30 MIN: CPT | Performed by: INTERNAL MEDICINE

## 2020-12-16 ENCOUNTER — VIRTUAL VISIT (OUTPATIENT)
Dept: INTERNAL MEDICINE CLINIC | Age: 70
End: 2020-12-16
Payer: MEDICARE

## 2020-12-16 PROCEDURE — G0439 PPPS, SUBSEQ VISIT: HCPCS | Performed by: INTERNAL MEDICINE

## 2020-12-16 PROCEDURE — G0446 INTENS BEHAVE THER CARDIO DX: HCPCS | Performed by: INTERNAL MEDICINE

## 2020-12-16 RX ORDER — PNEUMOCOCCAL VACCINE POLYVALENT 25; 25; 25; 25; 25; 25; 25; 25; 25; 25; 25; 25; 25; 25; 25; 25; 25; 25; 25; 25; 25; 25; 25 UG/.5ML; UG/.5ML; UG/.5ML; UG/.5ML; UG/.5ML; UG/.5ML; UG/.5ML; UG/.5ML; UG/.5ML; UG/.5ML; UG/.5ML; UG/.5ML; UG/.5ML; UG/.5ML; UG/.5ML; UG/.5ML; UG/.5ML; UG/.5ML; UG/.5ML; UG/.5ML; UG/.5ML; UG/.5ML; UG/.5ML
0.5 INJECTION, SOLUTION INTRAMUSCULAR; SUBCUTANEOUS ONCE
Qty: 0.5 ML | Refills: 0 | Status: SHIPPED | OUTPATIENT
Start: 2020-12-16 | End: 2020-12-16 | Stop reason: CLARIF

## 2020-12-16 RX ORDER — HYDROCHLOROTHIAZIDE 25 MG/1
25 TABLET ORAL EVERY MORNING
Qty: 90 TABLET | Refills: 1 | Status: SHIPPED | OUTPATIENT
Start: 2020-12-16 | End: 2021-12-21 | Stop reason: ALTCHOICE

## 2020-12-16 ASSESSMENT — LIFESTYLE VARIABLES
HOW MANY STANDARD DRINKS CONTAINING ALCOHOL DO YOU HAVE ON A TYPICAL DAY: 0
HAS A RELATIVE, FRIEND, DOCTOR, OR ANOTHER HEALTH PROFESSIONAL EXPRESSED CONCERN ABOUT YOUR DRINKING OR SUGGESTED YOU CUT DOWN: 0
AUDIT-C TOTAL SCORE: 1
HOW OFTEN DURING THE LAST YEAR HAVE YOU NEEDED AN ALCOHOLIC DRINK FIRST THING IN THE MORNING TO GET YOURSELF GOING AFTER A NIGHT OF HEAVY DRINKING: 0
HOW OFTEN DO YOU HAVE SIX OR MORE DRINKS ON ONE OCCASION: 0
AUDIT TOTAL SCORE: 1
HOW OFTEN DO YOU HAVE A DRINK CONTAINING ALCOHOL: 1
HOW OFTEN DURING THE LAST YEAR HAVE YOU FAILED TO DO WHAT WAS NORMALLY EXPECTED FROM YOU BECAUSE OF DRINKING: 0
HOW OFTEN DURING THE LAST YEAR HAVE YOU FOUND THAT YOU WERE NOT ABLE TO STOP DRINKING ONCE YOU HAD STARTED: 0
HAVE YOU OR SOMEONE ELSE BEEN INJURED AS A RESULT OF YOUR DRINKING: 0
HOW OFTEN DURING THE LAST YEAR HAVE YOU HAD A FEELING OF GUILT OR REMORSE AFTER DRINKING: 0
HOW OFTEN DURING THE LAST YEAR HAVE YOU BEEN UNABLE TO REMEMBER WHAT HAPPENED THE NIGHT BEFORE BECAUSE YOU HAD BEEN DRINKING: 0

## 2020-12-16 ASSESSMENT — PATIENT HEALTH QUESTIONNAIRE - PHQ9
SUM OF ALL RESPONSES TO PHQ QUESTIONS 1-9: 0
SUM OF ALL RESPONSES TO PHQ QUESTIONS 1-9: 0
SUM OF ALL RESPONSES TO PHQ9 QUESTIONS 1 & 2: 0
SUM OF ALL RESPONSES TO PHQ QUESTIONS 1-9: 0
1. LITTLE INTEREST OR PLEASURE IN DOING THINGS: 0
2. FEELING DOWN, DEPRESSED OR HOPELESS: 0

## 2020-12-16 NOTE — PROGRESS NOTES
adjuvanted vaccine [facility]) 50 MCG/0.5ML SUSR injection Inject 0.5 mLs into the muscle once for 1 dose . Repeat in 2 to 6 months    Tdap (ADACEL) 5-2-15.5 LF-MCG/0.5 injection Inject 0.5 mLs into the muscle once for 1 dose    amLODIPine (NORVASC) 10 MG tablet TAKE 1 TABLET BY MOUTH ONE TIME A DAY     escitalopram (LEXAPRO) 10 MG tablet Take 1 tablet by mouth daily    metoprolol succinate (TOPROL XL) 100 MG extended release tablet TAKE 1 TABLET BY MOUTH ONE TIME A DAY  (Patient taking differently: Indications: takes noon )    lisinopril (PRINIVIL;ZESTRIL) 40 MG tablet TAKE 1 TABLET BY MOUTH ONE TIME A DAY  (Patient taking differently: Indications: takes at noon )    atorvastatin (LIPITOR) 80 MG tablet Take 1 tablet by mouth daily    aspirin 81 MG tablet Take 81 mg by mouth daily. No current facility-administered medications for this visit. No Known Allergies      Substance Use History     Social History     Tobacco Use    Smoking status: Never Smoker    Smokeless tobacco: Never Used   Substance Use Topics    Alcohol use:  Yes     Alcohol/week: 3.0 standard drinks     Types: 3 Cans of beer per week     Comment: rare    Drug use: No          Family History     Family History   Problem Relation Age of Onset    Other Mother 35        blood clot    High Blood Pressure Father     Heart Disease Father 54        CAD, CABG    Clotting Disorder Sister         Bello Whiting             CareTeam (Including outside providers/suppliers regularly involved in providing care):   Patient Care Team:  David Rogers DO as PCP - General (Internal Medicine)  David Rogers DO as PCP - [facility] Empaneled Provider  Adri Huffman MD as Consulting Physician (Cardiothoracic Surgery)  Kirk Alarcon MD as Consulting Physician (Hematology and Oncology)  Lesia Yoo MD as Consulting Physician (Gastroenterology)  EDIRDRE Hernandez (Physician Assistant)      Based upon direct observation of the patient, evaluation of cognition reveals recent and remote memory intact. ROS:  Psych:  Depression is improved. He gets more depressed in the winter, but he is coping better. Some days are better than others. He has some more energy. He has more will power and motivation. He is happy that he has had his eyes and teeth dealt with  CV: Patient denies any exertional chest pain, chest tightness, lightheadedness. he has not been checking BP. Dr. Shai Martinez has suggested HCTZ if BP remains elevated. Patient-Reported Vitals 12/16/2020   Patient-Reported Weight 197lb   Patient-Reported Height 5'9   Patient-Reported Systolic 554   Patient-Reported Diastolic 64   Patient-Reported Pulse 68       Wt Readings from Last 3 Encounters:   10/30/20 197 lb 3.2 oz (89.4 kg)   09/25/20 198 lb 6.6 oz (90 kg)   08/31/20 199 lb (90.3 kg)         Patient's complete Health Risk Assessment and screening values have been reviewed and are found in Flowsheets. The following problems were reviewed today and where indicated follow up appointments were made and/or referrals ordered.     Positive Risk Factor Screenings with Interventions:            Hearing/Vision:  No exam data present  Hearing/Vision  Do you or your family notice any trouble with your hearing?: (!) Yes(wears hearing aides)  Do you have difficulty driving, watching TV, or doing any of your daily activities because of your eyesight?: No  Have you had an eye exam within the past year?: Yes  Hearing/Vision Interventions:  · Hearing concerns:  Continue hearing aid use      Personalized Preventive Plan   Current Health Maintenance Status  Immunization History   Administered Date(s) Administered    Influenza Virus Vaccine 09/30/2014    Influenza, High Dose (Fluzone 65 yrs and older) 09/17/2020    Pneumococcal Conjugate 13-valent ([id number]) 06/10/2019    Pneumococcal Polysaccharide ([id number]) 07/25/2014        Health Maintenance   Topic Date Due    DTaP/Tdap/Td vaccine (1 - Tdap) 07[birth year]20/1969    Shingles Vaccine (1 of 2) 07/20/2000    Annual Wellness Visit (AWV)  05/29/2019    Pneumococcal 65+ years Vaccine (2 of 2 - PPSV23) 06/10/2020    Lipid screen  06/22/2021    Potassium monitoring  06/22/2021    Creatinine monitoring  06/22/2021    Diabetes screen  06/22/2023    Colon cancer screen colonoscopy  02/03/2030    Flu vaccine  Completed    Hepatitis C screen  Completed    Hepatitis A vaccine  Aged Out    Hepatitis B vaccine  Aged Out    Hib vaccine  Aged Out    Meningococcal (ACWY) vaccine  Aged Out     Recommendations for [employer] Due: see orders and patient instructions/AVS.  . Recommended screening schedule for the next 5-10 years is provided to the patient in written form: see Patient Emmy Taylor was seen today for medicare awv. Diagnoses and all orders for this visit:    Routine general medical examination at a health care facility  --All care gaps identified and addressed  -     zoster recombinant adjuvanted vaccine [facility]) 50 MCG/0.5ML SUSR injection; Inject 0.5 mLs into the muscle once for 1 dose . Repeat in 2 to 6 months  -     Tdap (ADACEL) 5-2-15.5 LF-MCG/0.5 injection; Inject 0.5 mLs into the muscle once for 1 dose  -     [place] Intens behave ther cardio dx, 15 minutes []  -     Pneumovax 23. Patient will return to the office for this vaccine  -     CBC Auto Differential; Future  -     Comprehensive Metabolic Panel; Future  -     Lipid Panel; Future  -     TSH without Reflex; Future    Screening for cardiovascular condition  -     [place] Intens behave ther cardio dx, 15 minutes []    Need for prophylactic vaccination against diphtheria-tetanus-pertussis (DTP)  -     Tdap (ADACEL) 5-2-15.5 LF-MCG/0.5 injection;  Inject 0.5 mLs into the muscle once for 1 dose    Need for prophylactic vaccination and inoculation against varicella  -     zoster recombinant adjuvanted vaccine [facility]) 50 MCG/0.5ML salvador Reid is a 79 y.o. male being evaluated by a Virtual Visit (phone) encounter to address concerns as mentioned above. A caregiver was present when appropriate. Due to this being a TeleHealth encounter (During [facility] public health emergency), evaluation of the following organ systems was limited: Vitals/Constitutional/EENT/Resp/CV/GI//MS/Neuro/Skin/Heme-Lymph-Imm. Pursuant to the emergency declaration under the [address] [place] [address] and the [employer] and [employer] Act, this Virtual Visit was conducted with patient's (and/or legal guardian's) consent, to reduce the patient's risk of exposure to COVID-19 and provide necessary medical care. The patient (and/or legal guardian) has also been advised to contact this office for worsening conditions or problems, and seek emergency medical treatment and/or call 911 if deemed necessary. Patient identification was verified at the start of the visit: Yes    Services were provided through phone to substitute for in-person clinic visit. Patient and provider were located at their individual homes. --Miguel Angel Jeffery DO on 12/16/2020 at 10:36 AM    An electronic signature was used to authenticate this note.

## 2020-12-16 NOTE — PATIENT INSTRUCTIONS
· What are you most afraid of that might happen? (Maybe you're afraid of having pain, losing your independence, or being kept alive by machines.)  · Where would you prefer to die? (Your home? A hospital? A nursing home?)  · Do you want to donate your organs when you die? · Do you want certain Cheondoism practices performed before you die? When should you call for help? Be sure to contact your doctor if you have any questions. Where can you learn more? Go to [email or web address] and sign in to your [employer] account. Enter R264 in the C3L3B Digital box to learn more about \"Advance Directives: Care Instructions. \"     If you do not have an account, please click on the \"Sign Up Now\" link. Current as of: December 9, 2019               Content Version: 12.6  © 7123-8923 Tapulous. Care instructions adapted under license by [facility] ([place] campus). If you have questions about a medical condition or this instruction, always ask your healthcare professional. [address] any warranty or liability for your use of this information. Learning About Living Perroy  What is a living will? A living will, also called a declaration, is a legal form. It tells your family and your doctor your wishes when you can't speak for yourself. It's used by the health professionals who will treat you as you near the end of your life or if you get seriously hurt or ill. If you put your wishes in writing, your loved ones and others will know what kind of care you want. They won't need to guess. This can ease your mind and be helpful to others. And you can change or cancel your living will at any time. A living will is not the same as an estate or property will. An estate will explains what you want to happen with your money and property after you die. How do you use it? A living will is used to describe the kinds of treatment or life support you want as you near the end of your life or if you get seriously hurt or ill. Keep these facts in mind about living snyder. · Your living will is used only if you can't speak or make decisions for yourself. Most often, one or more doctors must certify that you can't speak or decide for yourself before your living will takes effect. · If you get better and can speak for yourself again, you can accept or refuse any treatment. It doesn't matter what you said in your living will. · Some states may limit your right to refuse treatment in certain cases. For example, you may need to clearly state in your living will that you don't want artificial hydration and nutrition, such as being fed through a tube. Is a living will a legal document? A living will is a legal document. Each state has its own laws about living snyder. And a living will may be called something else in your state. Here are some things to know about living snyder. · You don't need an  to complete a living will. But legal advice can be helpful if your state's laws are unclear. It can also help if your health history is complicated or your family can't agree on what should be in your living will. · You can change your living will at any time. Some people find that their wishes about end-of-life care change as their health changes. If you make big changes to your living will, complete a new form. · If you move to another state, make sure that your living will is legal in the state where you now live. In most cases, doctors will respect your wishes even if you have a form from a different state. · You might use a universal form that has been approved by many states. This kind of form can sometimes be filled out and stored online. Your digital copy will then be available wherever you have a connection to the internet. The doctors and nurses who need to treat you can find it right away. · Your state may offer an online registry. This is another place where you can store your living will online. · It's a good idea to get your living will notarized. This means using a person called a  to watch two people sign, or witness, your living will. What should you know when you create a living will? Here are some questions to ask yourself as you make your living will:  · Do you know enough about life support methods that might be used? If not, talk to your doctor so you know what might be done if you can't breathe on your own, your heart stops, or you can't swallow. · What things would you still want to be able to do after you receive life-support methods? Would you want to be able to walk? To speak? To eat on your own? To live without the help of machines? · Do you want certain Yarsanism practices performed if you become very ill? · If you have a choice, where do you want to be cared for? In your home? At a hospital or nursing home? · If you have a choice at the end of your life, where would you prefer to die? At home? In a hospital or nursing home? Somewhere else? · Would you prefer to be buried or cremated? · Do you want your organs to be donated after you die? What should you do with your living will? · Make sure that your family members and your health care agent have copies of your living will (also called a declaration). · Give your doctor a copy of your living will. Ask him or her to keep it as part of your medical record. If you have more than one doctor, make sure that each one has a copy. · Put a copy of your living will where it can be easily found. For example, some people may put a copy on their refrigerator door. If you are using a digital copy, be sure your doctor, family members, and health care agent know how to find and access it. Where can you learn more? Go to [email or web address] and sign in to your Snap[employer] account. Enter K078 in the Visual Unity box to learn more about \"Learning About Living Radha Goldberg. \"     If you do not have an account, please click on the \"Sign Up Now\" link. Current as of: December 9, 2019               Content Version: 12.6  © 3605-4936 Room Choice, PriceSpot. Care instructions adapted under license by [facility] ([facility]). If you have questions about a medical condition or this instruction, always ask your healthcare professional. [place] [address] any warranty or liability for your use of this information. Personalized Preventive Plan for Bhakti Alonzo - 12/16/2020  Medicare offers a range of preventive health benefits. Some of the tests and screenings are paid in full while other may be subject to a deductible, co-insurance, and/or copay. Some of these benefits include a comprehensive review of your medical history including lifestyle, illnesses that may run in your family, and various assessments and screenings as appropriate. After reviewing your medical record and screening and assessments performed today your provider may have ordered immunizations, labs, imaging, and/or referrals for you. A list of these orders (if applicable) as well as your Preventive Care list are included within your After Visit Summary for your review. Other Preventive Recommendations:    · A preventive eye exam performed by an eye specialist is recommended every 1-2 years to screen for glaucoma; cataracts, macular degeneration, and other eye disorders. · A preventive dental visit is recommended every 6 months. · Try to get at least 150 minutes of exercise per week or 10,000 steps per day on a pedometer . · Order or download the FREE \"Exercise & Physical Activity: Your Everyday Guide\" from [employer]. Call 9-197.673.3361 or search [employer] online. · You need 6979-4705 mg of calcium and 4870-1736 IU of vitamin D per day. It is possible to meet your calcium requirement with diet alone, but a vitamin D supplement is usually necessary to meet this goal.  · When exposed to the sun, use a sunscreen that protects against both UVA and UVB radiation with an SPF of 30 or greater. Reapply every 2 to 3 hours or after sweating, drying off with a towel, or swimming. · Always wear a seat belt when traveling in a car. Always wear a helmet when riding a bicycle or motorcycle  · Start hydrochlorothiazide 25 mg daily. · Monitor your blood pressure twice daily and record in a notebook. Call in your readings in the next 2 to 3 weeks. Bring in the notebook to your next visit. · Update your shingles and tetanus vaccines. These are obtained at the pharmacy  · Come into the office for your pneumonia vaccine. Patient Education        DASH Diet: Care Instructions  Your Care Instructions     The DASH diet is an eating plan that can help lower your blood pressure. DASH stands for Dietary Approaches to Stop Hypertension. Hypertension is high blood pressure. The DASH diet focuses on eating foods that are high in calcium, potassium, and magnesium. These nutrients can lower blood pressure. The foods that are highest in these nutrients are fruits, vegetables, low-fat dairy products, nuts, seeds, and legumes. But taking calcium, potassium, and magnesium supplements instead of eating foods that are high in those nutrients does not have the same effect. The DASH diet also includes whole grains, fish, and poultry. The DASH diet is one of several lifestyle changes your doctor may recommend to lower your high blood pressure. Your doctor may also want you to decrease the amount of sodium in your diet. Lowering sodium while following the DASH diet can lower blood pressure even further than just the DASH diet alone. Follow-up care is a key part of your treatment and safety. Be sure to make and go to all appointments, and call your doctor if you are having problems. It's also a good idea to know your test results and keep a list of the medicines you take. How can you care for yourself at home? Following the DASH diet  Eat 4 to 5 servings of fruit each day. A serving is 1 medium-sized piece of fruit, ½ cup chopped or canned fruit, 1/4 cup dried fruit, or 4 ounces (½ cup) of fruit juice. Choose fruit more often than fruit juice. Eat 4 to 5 servings of vegetables each day. A serving is 1 cup of lettuce or raw leafy vegetables, ½ cup of chopped or cooked vegetables, or 4 ounces (½ cup) of vegetable juice. Choose vegetables more often than vegetable juice. Get 2 to 3 servings of low-fat and fat-free dairy each day. A serving is 8 ounces of milk, 1 cup of yogurt, or 1 ½ ounces of cheese. Eat 6 to 8 servings of grains each day. A serving is 1 slice of bread, 1 ounce of dry cereal, or ½ cup of cooked rice, pasta, or cooked cereal. Try to choose whole-grain products as much as possible. Limit lean meat, poultry, and fish to 2 servings each day. A serving is 3 ounces, about the size of a deck of cards. Eat 4 to 5 servings of nuts, seeds, and legumes (cooked dried beans, lentils, and split peas) each week. A serving is 1/3 cup of nuts, 2 tablespoons of seeds, or ½ cup of cooked beans or peas. Limit fats and oils to 2 to 3 servings each day. A serving is 1 teaspoon of vegetable oil or 2 tablespoons of salad dressing. Limit sweets and added sugars to 5 servings or less a week. A serving is 1 tablespoon jelly or jam, ½ cup sorbet, or 1 cup of lemonade. Eat less than 2,300 milligrams (mg) of sodium a day. If you limit your sodium to 1,500 mg a day, you can lower your blood pressure even more. Tips for success  Start small. Do not try to make dramatic changes to your diet all at once. You might feel that you are missing out on your favorite foods and then be more likely to not follow the plan. Make small changes, and stick with them. Once those changes become habit, add a few more changes. Try some of the following:  Make it a goal to eat a fruit or vegetable at every meal and at snacks. This will make it easy to get the recommended amount of fruits and vegetables each day. Try yogurt topped with fruit and nuts for a snack or healthy dessert. Add lettuce, tomato, cucumber, and onion to sandwiches. Combine a ready-made pizza crust with low-fat mozzarella cheese and lots of vegetable toppings. Try using tomatoes, squash, spinach, broccoli, carrots, cauliflower, and onions. Have a variety of cut-up vegetables with a low-fat dip as an appetizer instead of chips and dip. Sprinkle sunflower seeds or chopped almonds over salads. Or try adding chopped walnuts or almonds to cooked vegetables. Try some vegetarian meals using beans and peas. Add garbanzo or kidney beans to salads. Make burritos and tacos with mashed chaves beans or black beans. Where can you learn more? Go to https://Qumunikia.[email or web address] org and sign in to your Integrated [employer]ometrics account. Enter E312 in the [employer] box to learn more about \"DASH Diet: Care Instructions. \"     If you do not have an account, please click on the \"Sign Up Now\" link. Current as of: December 16, 2019               Content Version: 12.6  © 2309-5588 [employer]. Care instructions adapted under license by [facility] ([facility]). If you have questions about a medical condition or this instruction, always ask your healthcare professional. [address] any warranty or liability for your use of this information. Patient Education        Learning About High Blood Pressure  What is high blood pressure? Blood pressure is a measure of how hard the blood pushes against the walls of your arteries. It's normal for blood pressure to go up and down throughout the day. But if it stays up, you have high blood pressure. Another name for high blood pressure is hypertension. Two numbers tell you your blood pressure. The first number is the systolic pressure (top number). It shows how hard the blood pushes when your heart is pumping. The second number is the diastolic pressure (bottom number). It shows how hard the blood pushes between heartbeats, when your heart is relaxed and filling with blood. Your doctor will give you a goal for your blood pressure based on your health and your age. High blood pressure (hypertension) means that the top number stays high, or the bottom number stays high, or both. High blood pressure increases the risk of stroke, heart attack, and other problems. What happens when you have high blood pressure? Blood flows through your arteries with too much force. Over time, this can damage the heart and the walls of your arteries. But you can't feel it. High blood pressure usually doesn't cause symptoms. High blood pressure makes your heart work harder. And that can lead to heart failure, which means your heart doesn't pump as much blood as your body needs. Fat and calcium start to build up in your arteries. This buildup is called hardening of the arteries. It can cause many problems including a heart attack and stroke. Arteries also carry blood and oxygen to organs like your eyes, kidneys, and brain. If high blood pressure damages those arteries, it can lead to vision loss, kidney disease, stroke, and a higher risk of dementia. How can you prevent high blood pressure? Stay at a healthy weight. Try to limit how much sodium you eat to less than 2,300 milligrams (mg) a day. If you limit your sodium to 1,500 mg a day, you can lower your blood pressure even more. Buy foods that are labeled \"unsalted,\" \"sodium-free,\" or \"low-sodium. \" Foods labeled \"reduced-sodium\" and \"light sodium\" may still have too much sodium. Flavor your food with garlic, lemon juice, onion, vinegar, herbs, and spices instead of salt. Do not use soy sauce, steak sauce, onion salt, garlic salt, mustard, or ketchup on your food. Use less salt (or none) when recipes call for it. You can often use half the salt a recipe calls for without losing flavor. Be physically active. Get at least 30 minutes of exercise on most days of the week. Walking is a good choice. You also may want to do other activities, such as running, swimming, cycling, or playing tennis or team sports. Limit alcohol to 2 drinks a day for men and 1 drink a day for women. Eat plenty of fruits, vegetables, and low-fat dairy products. Eat less saturated and total fats. How is high blood pressure treated? Your doctor will suggest making lifestyle changes to help your heart. For example, your doctor may ask you to eat healthy foods, quit smoking, lose extra weight, and be more active. If lifestyle changes don't help enough, your doctor may recommend that you take medicine. When blood pressure is very high, medicines are needed to lower it. Follow-up care is a key part of your treatment and safety. Be sure to make and go to all appointments, and call your doctor if you are having problems. It's also a good idea to know your test results and keep a list of the medicines you take. Where can you learn more? Go to [email or web address] and sign in to your Yast account. Enter P501 in the Weeding Technologieshire box to learn more about \"Learning About High Blood Pressure. \"     If you do not have an account, please click on the \"Sign Up Now\" link. Current as of: December 16, 2019               Content Version: 12.6  © 0856-3113 TutorGroup. Care instructions adapted under license by [facility] ([facility]). If you have questions about a medical condition or this instruction, always ask your healthcare professional. [place] [address] any warranty or liability for your use of this information. Patient Education        Recombinant Zoster (Shingles) Vaccine: What You Need to Know  Why get vaccinated? Recombinant zoster (shingles) vaccine can prevent shingles. Shingles (also called herpes zoster, or just zoster) is a painful skin rash, usually with blisters. In addition to the rash, shingles can cause fever, headache, chills, or upset stomach. More rarely, shingles can lead to pneumonia, hearing problems, blindness, brain inflammation (encephalitis), or death. The most common complication of shingles is long-term nerve pain called postherpetic neuralgia (PHN). PHN occurs in the areas where the shingles rash was, even after the rash clears up. It can last for months or years after the rash goes away. The pain from PHN can be severe and debilitating. About 10 to 18% of people who get shingles will experience PHN. The risk of PHN increases with age. An older adult with shingles is more likely to develop PHN and have longer lasting and more severe pain than a younger person with shingles. Shingles is caused by the varicella zoster virus, the same virus that causes chickenpox. After you have chickenpox, the virus stays in your body and can cause shingles later in life. Shingles cannot be passed from one person to another, but the virus that causes shingles can spread and cause chickenpox in someone who had never had chickenpox or received chickenpox vaccine. Recombinant shingles vaccine  Recombinant shingles vaccine provides strong protection against shingles. By preventing shingles, recombinant shingles vaccine also protects against PHN. Recombinant shingles vaccine is the preferred vaccine for the prevention of shingles. However, a different vaccine, live shingles vaccine, may be used in some circumstances. The recombinant shingles vaccine is recommended for adults 50 years and older without serious immune problems. It is given as a two-dose series. This vaccine is also recommended for people who have already gotten another type of shingles vaccine, the live shingles vaccine. There is no live virus in this vaccine. Shingles vaccine may be given at the same time as other vaccines. Talk with your health care provider  Tell your vaccine provider if the person getting the vaccine:  Has had an allergic reaction after a previous dose of recombinant shingles vaccine, or has any severe, life-threatening allergies. Is pregnant or breastfeeding. Is currently experiencing an episode of shingles. In some cases, your health care provider may decide to postpone shingles vaccination to a future visit. People with minor illnesses, such as a cold, may be vaccinated. People who are moderately or severely ill should usually wait until they recover before getting recombinant shingles vaccine. Your health care provider can give you more information.   Risks of a vaccine reaction Visit CDC's website at www.cdc.gov/vaccines  Vaccine Information Statement  Recombinant Zoster Vaccine  10/30/2019  [facility] and [facility] for Disease Control and Prevention  Many Vaccine Information Statements are available in Mac[place] and other languages. See www.immunize.org/vis. Hojas de Información Sobre Vacunas están disponibles en Español y en muchos otros idiomas. Visite [email or web address]   Care instructions adapted under license by [facility] ([facility]). If you have questions about a medical condition or this instruction, always ask your healthcare professional. [address] any warranty or liability for your use of this information. Patient Education        Tdap (Tetanus, Diphtheria, Pertussis) Vaccine: What You Need to Know  Why get vaccinated? Tdap vaccine can prevent tetanus, diphtheria, and pertussis. Diphtheria and pertussis spread from person to person. Tetanus enters the body through cuts or wounds. TETANUS (T) causes painful stiffening of the muscles. Tetanus can lead to serious health problems, including being unable to open the mouth, having trouble swallowing and breathing, or death. DIPHTHERIA (D) can lead to difficulty breathing, heart failure, paralysis, or death. PERTUSSIS (aP), also known as \"whooping cough,\" can cause uncontrollable, violent coughing which makes it hard to breathe, eat, or drink. Pertussis can be extremely serious in babies and young children, causing pneumonia, convulsions, brain damage, or death. In teens and adults, it can cause weight loss, loss of bladder control, passing out, and rib fractures from severe coughing. Tdap vaccine  Tdap is only for children 7 years and older, adolescents, and adults. Adolescents should receive a single dose of Tdap, preferably at age 6 or 15 years. Pregnant women should get a dose of Tdap during every pregnancy, to protect the  from pertussis. Infants are most at risk for severe, life threatening complications from pertussis. Adults who have never received Tdap should get a dose of Tdap. Also, adults should receive a booster dose every 10 years, or earlier in the case of a severe and dirty wound or burn. Booster doses can be either Tdap or Td (a different vaccine that protects against tetanus and diphtheria but not pertussis). Tdap may be given at the same time as other vaccines. Talk with your health care provider  Tell your vaccine provider if the person getting the vaccine:  Has had an allergic reaction after a previous dose of any vaccine that protects against tetanus, diphtheria, or pertussis, or has any severe, life threatening allergies. Has had a coma, decreased level of consciousness, or prolonged seizures within 7 days after a previous dose of any pertussis vaccine (DTP, DTaP, or Tdap). Has seizures or another nervous system problem. Has ever had Guillain-Barré Syndrome (also called GBS). Has had severe pain or swelling after a previous dose of any vaccine that protects against tetanus or diphtheria. In some cases, your health care provider may decide to postpone Tdap vaccination to a future visit. People with minor illnesses, such as a cold, may be vaccinated. People who are moderately or severely ill should usually wait until they recover before getting Tdap vaccine. Your health care provider can give you more information. Risks of a vaccine reaction  Pain, redness, or swelling where the shot was given, mild fever, headache, feeling tired, and nausea, vomiting, diarrhea, or stomachache sometimes happen after Tdap vaccine. People sometimes faint after medical procedures, including vaccination. Tell your provider if you feel dizzy or have vision changes or ringing in the ears. As with any medicine, there is a very remote chance of a vaccine causing a severe allergic reaction, other serious injury, or death. What if there is a serious problem? An allergic reaction could occur after the vaccinated person leaves the clinic. If you see signs of a severe allergic reaction (hives, swelling of the face and throat, difficulty breathing, a fast heartbeat, dizziness, or weakness), call 9-1-1 and get the person to the nearest hospital.  For other signs that concern you, call your health care provider. Adverse reactions should be reported to the Vaccine Adverse Event Reporting System (VAERS). Your health care provider will usually file this report, or you can do it yourself. Visit the VAERS website at www.vaers. hhs.gov or call 7-623.619.5984. VAERS is only for reporting reactions, and VAERS staff do not give medical advice. The National Vaccine Injury Compensation Program  The National Vaccine Injury Compensation Program (VICP) is a federal program that was created to compensate people who may have been injured by certain vaccines. Visit the VICP website at www.hrsa.gov/vaccinecompensation or call 3-177.297.5307 to learn about the program and about filing a claim. There is a time limit to file a claim for compensation. How can I learn more? Ask your health care provider. Call your local or state health department. Contact the Centers for Disease Control and Prevention (CDC): Call 3-682.427.7793 (1-800-CDC-INFO) or  Visit CDC's website at www.cdc.gov/vaccines  Vaccine Information Statement (Interim)  Tdap (Tetanus, Diphtheria, Pertussis) Vaccine  04/01/2020  42 RADHA Leroy [id number]  Department of Health and Human Services  Centers for Disease Control and Prevention  Many Vaccine Information Statements are available in Vietnamese and other languages. See www.immunize.org/vis. Muchas hojas de información sobre vacunas están disponibles en español y en otros idiomas. Visite www.immunize.org/vis. Care instructions adapted under license by [facility] ([facility]). If you have questions about a medical condition or this instruction, always ask your healthcare professional. [address] any warranty or liability for your use of this information. Patient Education        Pneumococcal Polysaccharide Vaccine: What You Need to Know  Why get vaccinated? Pneumococcal polysaccharide vaccine (PPSV23) can prevent pneumococcal disease. Pneumococcal disease refers to any illness caused by pneumococcal bacteria. These bacteria can cause many types of illnesses, including pneumonia, which is an infection of the lungs. Pneumococcal bacteria are one of the most common causes of pneumonia. Besides pneumonia, pneumococcal bacteria can also cause:  Ear infections,  Sinus infections  Meningitis (infection of the tissue covering the brain and spinal cord)  Bacteremia (bloodstream infection)  Anyone can get pneumococcal disease, but children under 3years of age, people with certain medical conditions, adults 72 years or older, and cigarette smokers are at the highest risk. Most pneumococcal infections are mild. However, some can result in long-term problems, such as brain damage or hearing loss. Meningitis, bacteremia, and pneumonia caused by pneumococcal disease can be fatal.  PPSV23  PPSV23 protects against 23 types of bacteria that cause pneumococcal disease. PPSV23 is recommended for: All adults 72 years or older,  Anyone 2 years or older with certain medical conditions that can lead to an increased risk for pneumococcal disease. Most people need only one dose of PPSV23. A second dose of PPSV23, and another type of pneumococcal vaccine called PCV13, are recommended for certain high-risk groups. Your health care provider can give you more information. People 65 years or older should get a dose of PPSV23 even if they have already gotten one or more doses of the vaccine before they turned 65. Talk with your health care provider  Tell your vaccine provider if the person getting the vaccine:  Has had an allergic reaction after a previous dose of PPSV23, or has any severe, life-threatening allergies. In some cases, your health care provider may decide to postpone PPSV23 vaccination to a future visit. People with minor illnesses, such as a cold, may be vaccinated. People who are moderately or severely ill should usually wait until they recover before getting PPSV23. Your health care provider can give you more information. Risks of a vaccine reaction  Redness or pain where the shot is given, feeling tired, fever, or muscle aches can happen after PPSV23. People sometimes faint after medical procedures, including vaccination. Tell your provider if you feel dizzy or have vision changes or ringing in the ears. As with any medicine, there is a very remote chance of a vaccine causing a severe allergic reaction, other serious injury, or death. What if there is a serious problem? An allergic reaction could occur after the vaccinated person leaves the clinic. If you see signs of a severe allergic reaction (hives, swelling of the face and throat, difficulty breathing, a fast heartbeat, dizziness, or weakness), call 9-1-1 and get the person to the nearest hospital.  For other signs that concern you, call your health care provider. Adverse reactions should be reported to the Vaccine Adverse Event Reporting System (VAERS). Your health care provider will usually file this report, or you can do it yourself. Visit the VAERS website at www.vaers. hhs.gov at www.vaers. hhs.gov or call 4-559.634.8070. VAERS is only for reporting reactions, and VAERS staff do not give medical advice. How can I learn more? Ask your health care provider. Call your local or state health department. Contact the Centers for Disease Control and Prevention (CDC):   Call 2-117.401.5273 (1-800-CDC-INFO) or Visit CDC's website at www.cdc.gov/vaccines  Vaccine Information Statement  PPSV23 Vaccine  10/30/2019  [facility] and [facility] for Disease Control and Prevention  Many Vaccine Information Statements are available in [place] and other languages. See www.immunize.org/vis. Hojas de información Sobre Vacunas están disponibles en español y en muchos otros idiomas. Visite Wesley.si. Care instructions adapted under license by [facility] ([facility]). If you have questions about a medical condition or this instruction, always ask your healthcare professional. [place] [address] any warranty or liability for your use of this information. Patient Education        Home Blood Pressure Test: About This Test  What is it? A home blood pressure test allows you to keep track of your blood pressure at home. Blood pressure is a measure of the force of blood against the walls of your arteries. Blood pressure readings include two numbers, such as 130/80 (say \"130 over 80\"). The first number is the systolic pressure. The second number is the diastolic pressure. Why is this test done? You may do this test at home to:  Find out if you have high blood pressure. Track your blood pressure if you have high blood pressure. Track how well medicine is working to reduce high blood pressure. Check how lifestyle changes, such as weight loss and exercise, are affecting blood pressure. How do you prepare for the test?  For at least 30 minutes before you take your blood pressure, don't exercise or use caffeine, tobacco, or medicines that raise blood pressure. Take your blood pressure while you feel comfortable and relaxed. Sit quietly with both feet on the floor for at least 5 minutes before the test.  How is the test done? Sit with your arm slightly bent and resting on a table so that your upper arm is at the same level as your heart. Roll up your sleeve or take off your shirt to expose your upper arm. Wrap the blood pressure cuff around your upper arm so that the lower edge of the cuff is about 1 inch above the bend of your elbow. Proceed with the following steps depending on if you are using an automatic or manual pressure monitor. Automatic blood pressure monitors  Press the on/off button on the automatic monitor and wait until the ready-to-measure \"heart\" symbol appears next to zero in the display window. Press the start button. The cuff will inflate and deflate by itself. Your blood pressure numbers will appear on the screen. Write your numbers in your log book, along with the date and time. Manual blood pressure monitors  Place the earpieces of a stethoscope in your ears, and place the bell of the stethoscope over the artery, just below the cuff. Close the valve on the rubber inflating bulb. Squeeze the bulb rapidly with your opposite hand to inflate the cuff until the dial or column of mercury reads about 30 mm Hg higher than your usual systolic pressure. If you do not know your usual pressure, inflate the cuff to 210 mm Hg or until the pulse at your wrist disappears. Open the pressure valve just slightly by twisting or pressing the valve on the bulb. As you watch the pressure slowly fall, note the level on the dial at which you first start to hear a pulsing or tapping sound through the stethoscope. This is your systolic blood pressure. Continue letting the air out slowly. The sounds will become muffled and will finally disappear. Note the pressure when the sounds completely disappear. This is your diastolic blood pressure. Let out all the remaining air. Write your numbers in your log book, along with the date and time. Do not smoke. Smoking can make health problems worse. If you need help quitting, talk to your doctor about stop-smoking programs and medicines. These can increase your chances of quitting for good. Protect your skin from too much sun. When you're outdoors from 10 a.m. to 4 p.m., stay in the shade or cover up with clothing and a hat with a wide brim. Wear sunglasses that block UV rays. Even when it's cloudy, put broad-spectrum sunscreen (SPF 30 or higher) on any exposed skin. See a dentist one or two times a year for checkups and to have your teeth cleaned. Wear a seat belt in the car. Follow your doctor's advice about when to have certain tests. These tests can spot problems early. For men and women  Cholesterol. Your doctor will tell you how often to have this done based on your overall health and other things that can increase your risk for heart attack and stroke. Blood pressure. Have your blood pressure checked during a routine doctor visit. Your doctor will tell you how often to check your blood pressure based on your age, your blood pressure results, and other factors. Diabetes. Ask your doctor whether you should have tests for diabetes. Vision. Experts recommend that you have yearly exams for glaucoma and other age-related eye problems. Hearing. Tell your doctor if you notice any change in your hearing. You can have tests to find out how well you hear. Colon cancer tests. Keep having colon cancer tests as your doctor recommends. You can have one of several types of tests. Heart attack and stroke risk. At least every 4 to 6 years, you should have your risk for heart attack and stroke assessed. Your doctor uses factors such as your age, blood pressure, cholesterol, and whether you smoke or have diabetes to show what your risk for a heart attack or stroke is over the next 10 years. Osteoporosis. Talk to your doctor about whether you should have a bone density test to find out whether you have thinning bones. Ask your doctor if you need to take a calcium plus vitamin D supplement. You may be able to get enough calcium and vitamin D through your diet. For women  Pap test and pelvic exam. You may no longer need a Pap test. Talk with your doctor about whether to stop or continue to have Pap tests. Breast exam and mammogram. Ask how often you should have a mammogram, which is an X-ray of your breasts. A mammogram can spot breast cancer before it can be felt and when it is easiest to treat. Thyroid disease. Talk to your doctor about whether to have your thyroid checked as part of a regular physical exam. Women have an increased chance of a thyroid problem. For men  Prostate exam. Talk to your doctor about whether you should have a blood test (called a PSA test) for prostate cancer. Experts recommend that you discuss the benefits and risks of the test with your doctor before you decide whether to have this test. Some experts say that men ages 79 and older no longer need testing. Abdominal aortic aneurysm. Ask your doctor whether you should have a test to check for an aneurysm. You may need a test if you ever smoked or if your parent, brother, sister, or child has had an aneurysm. When should you call for help? Watch closely for changes in your health, and be sure to contact your doctor if you have any problems or symptoms that concern you. Where can you learn more? Go to [email or web address] and sign in to your [employer] account. Enter Q021 in the [facility] box to learn more about \"Well Visit, Over 65: Care Instructions. \"     If you do not have an account, please click on the \"Sign Up Now\" link. Current as of: May 27, 2020               Content Version: 12.6  © 1612-5163 [employer].

## 2020-12-17 ENCOUNTER — NURSE ONLY (OUTPATIENT)
Dept: INTERNAL MEDICINE CLINIC | Age: 70
End: 2020-12-17
Payer: MEDICARE

## 2020-12-17 PROCEDURE — 90732 PPSV23 VACC 2 YRS+ SUBQ/IM: CPT | Performed by: INTERNAL MEDICINE

## 2020-12-17 PROCEDURE — G0009 ADMIN PNEUMOCOCCAL VACCINE: HCPCS | Performed by: INTERNAL MEDICINE

## 2020-12-28 DIAGNOSIS — I10 BENIGN ESSENTIAL HTN: ICD-10-CM

## 2020-12-28 DIAGNOSIS — Z00.00 ROUTINE GENERAL MEDICAL EXAMINATION AT A HEALTH CARE FACILITY: ICD-10-CM

## 2020-12-28 DIAGNOSIS — R73.01 IMPAIRED FASTING GLUCOSE: ICD-10-CM

## 2020-12-28 DIAGNOSIS — E78.5 HYPERLIPIDEMIA, UNSPECIFIED HYPERLIPIDEMIA TYPE: ICD-10-CM

## 2020-12-28 DIAGNOSIS — I25.10 CORONARY ARTERY DISEASE INVOLVING NATIVE CORONARY ARTERY OF NATIVE HEART WITHOUT ANGINA PECTORIS: ICD-10-CM

## 2020-12-28 LAB
A/G RATIO: 2 (ref 1.1–2.2)
ALBUMIN SERPL-MCNC: 4.6 G/DL (ref 3.4–5)
ALP BLD-CCNC: 71 U/L (ref 40–129)
ALT SERPL-CCNC: 11 U/L (ref 10–40)
ANION GAP SERPL CALCULATED.3IONS-SCNC: 10 MMOL/L (ref 3–16)
AST SERPL-CCNC: 11 U/L (ref 15–37)
BASOPHILS ABSOLUTE: 0 K/UL (ref 0–0.2)
BASOPHILS RELATIVE PERCENT: 0.5 %
BILIRUB SERPL-MCNC: 0.8 MG/DL (ref 0–1)
BUN BLDV-MCNC: 14 MG/DL (ref 7–20)
CALCIUM SERPL-MCNC: 9.7 MG/DL (ref 8.3–10.6)
CHLORIDE BLD-SCNC: 101 MMOL/L (ref 99–110)
CHOLESTEROL, TOTAL: 145 MG/DL (ref 0–199)
CO2: 29 MMOL/L (ref 21–32)
CREAT SERPL-MCNC: 0.7 MG/DL (ref 0.8–1.3)
EOSINOPHILS ABSOLUTE: 0.5 K/UL (ref 0–0.6)
EOSINOPHILS RELATIVE PERCENT: 5.4 %
GFR AFRICAN AMERICAN: >60
GFR NON-AFRICAN AMERICAN: >60
GLOBULIN: 2.3 G/DL
GLUCOSE BLD-MCNC: 87 MG/DL (ref 70–99)
HCT VFR BLD CALC: 44.5 % (ref 40.5–52.5)
HDLC SERPL-MCNC: 51 MG/DL (ref 40–60)
HEMOGLOBIN: 15.1 G/DL (ref 13.5–17.5)
LDL CHOLESTEROL CALCULATED: 76 MG/DL
LYMPHOCYTES ABSOLUTE: 1.7 K/UL (ref 1–5.1)
LYMPHOCYTES RELATIVE PERCENT: 18 %
MCH RBC QN AUTO: 30.9 PG (ref 26–34)
MCHC RBC AUTO-ENTMCNC: 33.8 G/DL (ref 31–36)
MCV RBC AUTO: 91.5 FL (ref 80–100)
MONOCYTES ABSOLUTE: 0.7 K/UL (ref 0–1.3)
MONOCYTES RELATIVE PERCENT: 7 %
NEUTROPHILS ABSOLUTE: 6.6 K/UL (ref 1.7–7.7)
NEUTROPHILS RELATIVE PERCENT: 69.1 %
PDW BLD-RTO: 14.1 % (ref 12.4–15.4)
PLATELET # BLD: 212 K/UL (ref 135–450)
PMV BLD AUTO: 7.8 FL (ref 5–10.5)
POTASSIUM SERPL-SCNC: 4.3 MMOL/L (ref 3.5–5.1)
RBC # BLD: 4.87 M/UL (ref 4.2–5.9)
SODIUM BLD-SCNC: 140 MMOL/L (ref 136–145)
TOTAL PROTEIN: 6.9 G/DL (ref 6.4–8.2)
TRIGL SERPL-MCNC: 91 MG/DL (ref 0–150)
TSH SERPL DL<=0.05 MIU/L-ACNC: 1.59 UIU/ML (ref 0.27–4.2)
VLDLC SERPL CALC-MCNC: 18 MG/DL
WBC # BLD: 9.5 K/UL (ref 4–11)

## 2020-12-29 LAB
ESTIMATED AVERAGE GLUCOSE: 93.9 MG/DL
HBA1C MFR BLD: 4.9 %

## 2020-12-31 RX ORDER — AMLODIPINE BESYLATE 10 MG/1
TABLET ORAL
Qty: 90 TABLET | Refills: 0 | Status: SHIPPED | OUTPATIENT
Start: 2020-12-31 | End: 2021-03-15 | Stop reason: SDUPTHER

## 2020-12-31 RX ORDER — ESCITALOPRAM OXALATE 10 MG/1
TABLET ORAL
Qty: 30 TABLET | Refills: 0 | Status: SHIPPED | OUTPATIENT
Start: 2020-12-31 | End: 2021-02-16

## 2020-12-31 RX ORDER — ATORVASTATIN CALCIUM 80 MG/1
TABLET, FILM COATED ORAL
Qty: 90 TABLET | Refills: 0 | Status: SHIPPED | OUTPATIENT
Start: 2020-12-31 | End: 2021-03-15 | Stop reason: SDUPTHER

## 2021-02-15 DIAGNOSIS — F32.1 CURRENT MODERATE EPISODE OF MAJOR DEPRESSIVE DISORDER WITHOUT PRIOR EPISODE (HCC): ICD-10-CM

## 2021-02-16 RX ORDER — ESCITALOPRAM OXALATE 10 MG/1
TABLET ORAL
Qty: 30 TABLET | Refills: 0 | Status: SHIPPED | OUTPATIENT
Start: 2021-02-16 | End: 2021-03-15 | Stop reason: SDUPTHER

## 2021-02-24 ENCOUNTER — IMMUNIZATION (OUTPATIENT)
Dept: PRIMARY CARE CLINIC | Age: 71
End: 2021-02-24
Payer: MEDICARE

## 2021-02-24 PROCEDURE — 0001A COVID-19, PFIZER VACCINE 30MCG/0.3ML DOSE: CPT | Performed by: FAMILY MEDICINE

## 2021-02-24 PROCEDURE — 91300 COVID-19, PFIZER VACCINE 30MCG/0.3ML DOSE: CPT | Performed by: FAMILY MEDICINE

## 2021-03-15 ENCOUNTER — OFFICE VISIT (OUTPATIENT)
Dept: INTERNAL MEDICINE CLINIC | Age: 71
End: 2021-03-15
Payer: MEDICARE

## 2021-03-15 VITALS
HEART RATE: 68 BPM | WEIGHT: 197 LBS | RESPIRATION RATE: 12 BRPM | TEMPERATURE: 98.5 F | BODY MASS INDEX: 29.18 KG/M2 | HEIGHT: 69 IN | SYSTOLIC BLOOD PRESSURE: 120 MMHG | DIASTOLIC BLOOD PRESSURE: 50 MMHG

## 2021-03-15 DIAGNOSIS — I25.10 CORONARY ARTERY DISEASE INVOLVING NATIVE CORONARY ARTERY OF NATIVE HEART WITHOUT ANGINA PECTORIS: ICD-10-CM

## 2021-03-15 DIAGNOSIS — I10 BENIGN ESSENTIAL HTN: ICD-10-CM

## 2021-03-15 DIAGNOSIS — F32.1 CURRENT MODERATE EPISODE OF MAJOR DEPRESSIVE DISORDER WITHOUT PRIOR EPISODE (HCC): Primary | ICD-10-CM

## 2021-03-15 DIAGNOSIS — R73.01 IMPAIRED FASTING GLUCOSE: ICD-10-CM

## 2021-03-15 DIAGNOSIS — I35.1 NONRHEUMATIC AORTIC VALVE INSUFFICIENCY: ICD-10-CM

## 2021-03-15 DIAGNOSIS — E78.5 HYPERLIPIDEMIA, UNSPECIFIED HYPERLIPIDEMIA TYPE: ICD-10-CM

## 2021-03-15 PROCEDURE — 99214 OFFICE O/P EST MOD 30 MIN: CPT | Performed by: INTERNAL MEDICINE

## 2021-03-15 RX ORDER — ESCITALOPRAM OXALATE 10 MG/1
TABLET ORAL
Qty: 30 TABLET | Refills: 3 | Status: SHIPPED | OUTPATIENT
Start: 2021-03-15 | End: 2021-06-30

## 2021-03-15 RX ORDER — AMLODIPINE BESYLATE 10 MG/1
TABLET ORAL
Qty: 90 TABLET | Refills: 3 | Status: SHIPPED | OUTPATIENT
Start: 2021-03-15 | End: 2022-06-03

## 2021-03-15 RX ORDER — ATORVASTATIN CALCIUM 80 MG/1
TABLET, FILM COATED ORAL
Qty: 90 TABLET | Refills: 3 | Status: SHIPPED | OUTPATIENT
Start: 2021-03-15 | End: 2022-06-03

## 2021-03-15 NOTE — PATIENT INSTRUCTIONS
To do list:      #1 echocardiogram will be due this year around the time of your cardiology visit    #2 lab work next visit    #3 continue Lifestyle modification including low calorie diet focusing on Low fat/low cholesterol and low carbohydrate intake, along with  increasing cardiovascular (aerobic) exercise.

## 2021-03-15 NOTE — PROGRESS NOTES
[facility]) Physicians  Internal Medicine  Patient Encounter  Alejandro Guaman D.O., [facility]        Chief Complaint   Patient presents with   Elizabeth Pham    Medication Check       HPI: 79 y.o. male seen today requesting his routine checkup regarding the status of current chronic medical problems as below along with a medication review and reconciliation. Health maintenance items overdue:  Tdap  Shingles vaccine  Covid vaccine #2    Patient has multiple medical problems including but not limited to coronary artery disease, acute ischemic stroke in 2014, adenomatous colon polyps, hard of hearing, hypertension, hyperlipidemia, von Willebrand's disease, CABG, osteoarthritis knee, hearing loss, Aortic insufficiency. Patient is requesting refills on medications. Overall, he has been feeling well. He does have the winter blues, but his mood is much better on the Lexapro. He states he has more \"cabin fever\" than anything. He C/O ongoing problems with hearing problems. He is going to get new hearing aides. He denies CP, SOB, swelling, orthopnea, HA's, dizziness, lightheadedness, syncope. He denies bruising or bleeding. He denies episodes of unilateral weakness, paresthesias, speech or new visual changes. He denies abd pain, N/V/D. Patient denies any claudication. We reviewed LAB from December:  Electrolytes, renal function, A1C, Lipid, LFT's-- WNL.        Past Medical History:   Diagnosis Date    Acute ischemic stroke ([facility] [address] 10/24/2014    Right    Anemia     Arthritis     rt knee    CAD (coronary artery disease) 05/29/2014    LAD, RCA, Diag - cariothoracic surgery    Cataracts, bilateral 07/2020    Colon polyps 02/03/2020    Tubular adenoma, Dr. Korin Robison Andreafski (hard of hearing)     hermes-lateral hearing aids    HTN (hypertension)     Hyperlipidemia     Hypokalemia     Jaw dislocation     Von Willebrand disease ([facility] [address]     Walking pneumonia          MEDICATIONS:  Medication Sig   atorvastatin (LIPITOR) 80 MG tablet TAKE 1 TABLET BY MOUTH ONE TIME A DAY   amLODIPine (NORVASC) 10 MG tablet TAKE 1 TABLET BY MOUTH EVERY DAY   escitalopram (LEXAPRO) 10 MG tablet TAKE 1 TABLET BY MOUTH EVERY DAY   hydroCHLOROthiazide (HYDRODIURIL) 25 MG tablet Take 1 tablet by mouth every morning   metoprolol succinate (TOPROL XL) 100 MG extended release tablet TAKE 1 TABLET BY MOUTH ONE TIME A DAY   Patient taking differently: Indications: takes noon    lisinopril (PRINIVIL;ZESTRIL) 40 MG tablet TAKE 1 TABLET BY MOUTH ONE TIME A DAY   Patient taking differently: Indications: takes at noon    aspirin 81 MG tablet Take 81 mg by mouth daily. Review of Systems - As per HPI    OBJECTIVE:  Vitals:    03/15/21 1045   BP: (!) 120/50   Pulse: 68   Resp: 12   Temp: 98.5 °F (36.9 °C)   Weight: 197 lb (89.4 kg)   Height: 5' 9\" (1.753 m)     Body mass index is 29.09 kg/m². Wt Readings from Last 3 Encounters:   03/15/21 197 lb (89.4 kg)   10/30/20 197 lb 3.2 oz (89.4 kg)   09/25/20 198 lb 6.6 oz (90 kg)     BP Readings from Last 3 Encounters:   03/15/21 (!) 120/50   10/30/20 (!) 144/62   09/25/20 (!) 146/57      GEN: NAD, A&O, Non-toxic  HEENT: NC/AT, SALLY, EOMI, Oral cavity Clear,  TM's NL, Nasal cavity clear. NECK: Supple. No thyromegaly. No JVD  LYMPH: No C/SC nodes. CV: S1 S2 NL, RRR. Clearly auscultated 2/6 distolic murmur, 1/6 systolic murmur. PULM: CTA, symmentric air exchange  EXT: No edema. GI: Abdomen is soft, NT, BS+, No hepatomegaly. No masses. NEURO: No focal or lateralizing deficits. VASC:  No carotid bruits. Pulses symmetric  SKIN:  No rashes or lesions of concern      ASSESSMENT/PLAN:    1. Current moderate episode of major depressive disorder without prior episode ([address]  Condition is well controlled on Lexapro  Refill medication  Continue current therapy  - escitalopram (LEXAPRO) 10 MG tablet; TAKE 1 TABLET BY MOUTH EVERY DAY  Dispense: 30 tablet; Refill: 3    2.  Benign essential HTN  Blood pressure is well controlled  Continue current medication  Continue to monitor for adverse effects such as edema  - amLODIPine (NORVASC) 10 MG tablet; TAKE 1 TABLET BY MOUTH EVERY DAY  Dispense: 90 tablet; Refill: 3    3. Hyperlipidemia, unspecified hyperlipidemia type  Condition is well controlled  Continue high intensity dose Lipitor  Continue to monitor for adverse side effects. Continue to focus on lifestyle approach. We talked about a walking program.  - atorvastatin (LIPITOR) 80 MG tablet; TAKE 1 TABLET BY MOUTH ONE TIME A DAY  Dispense: 90 tablet; Refill: 3    4. Impaired fasting glucose  Condition is well controlled  We will check labs next visit  Continue to focus on a sensible low simple sugar diet and staying physically active    5. Nonrheumatic aortic valve insufficiency  Condition is stable and asymptomatic  Patient clearly has a diastolic murmur on exam  He will be due for an echocardiogram this year    6. Coronary artery disease involving native coronary artery of native heart without angina pectoris  Condition is stable without signs of angina or fluid overload. Continue to monitor for signs of cardiac decompensation. Continue current medications          Discussed medications with patient who voiced understanding of their use, indication and potential side effects. Pt also understands the above recommendations. All questions answered. This note was generated completely or in part utilizing Dragon dictation speech recognition software. Occasionally, words are mistranscribed and despite editing, the text may contain inaccuracies due to incorrect word recognition.   If further clarification is needed please contact the office at [phone]       Electronically signed    Fany Walker D.O.

## 2021-03-17 ENCOUNTER — IMMUNIZATION (OUTPATIENT)
Dept: PRIMARY CARE CLINIC | Age: 71
End: 2021-03-17
Payer: MEDICARE

## 2021-03-17 PROCEDURE — 0002A PR IMM ADMN SARSCOV2 30MCG/0.3ML DIL RECON 2ND DOSE: CPT | Performed by: FAMILY MEDICINE

## 2021-03-17 PROCEDURE — 91300 COVID-19, PFIZER VACCINE 30MCG/0.3ML DOSE: CPT | Performed by: FAMILY MEDICINE

## 2021-06-09 RX ORDER — LISINOPRIL 40 MG/1
TABLET ORAL
Qty: 90 TABLET | Refills: 0 | Status: SHIPPED | OUTPATIENT
Start: 2021-06-09 | End: 2021-10-01

## 2021-06-09 RX ORDER — METOPROLOL SUCCINATE 100 MG/1
TABLET, EXTENDED RELEASE ORAL
Qty: 90 TABLET | Refills: 0 | Status: SHIPPED | OUTPATIENT
Start: 2021-06-09 | End: 2021-10-01

## 2021-06-30 DIAGNOSIS — F32.1 CURRENT MODERATE EPISODE OF MAJOR DEPRESSIVE DISORDER WITHOUT PRIOR EPISODE (HCC): ICD-10-CM

## 2021-06-30 RX ORDER — ESCITALOPRAM OXALATE 10 MG/1
TABLET ORAL
Qty: 30 TABLET | Refills: 0 | Status: SHIPPED | OUTPATIENT
Start: 2021-06-30 | End: 2021-06-30

## 2021-06-30 RX ORDER — ESCITALOPRAM OXALATE 10 MG/1
TABLET ORAL
Qty: 30 TABLET | Refills: 0 | Status: SHIPPED | OUTPATIENT
Start: 2021-06-30 | End: 2021-12-21 | Stop reason: ALTCHOICE

## 2021-07-07 ENCOUNTER — OFFICE VISIT (OUTPATIENT)
Dept: INTERNAL MEDICINE CLINIC | Age: 71
End: 2021-07-07
Payer: MEDICARE

## 2021-07-07 VITALS
RESPIRATION RATE: 12 BRPM | HEART RATE: 70 BPM | HEIGHT: 69 IN | DIASTOLIC BLOOD PRESSURE: 60 MMHG | BODY MASS INDEX: 28.14 KG/M2 | WEIGHT: 190 LBS | SYSTOLIC BLOOD PRESSURE: 138 MMHG

## 2021-07-07 DIAGNOSIS — M17.12 PRIMARY OSTEOARTHRITIS OF LEFT KNEE: ICD-10-CM

## 2021-07-07 DIAGNOSIS — Z12.5 SCREENING FOR PROSTATE CANCER: ICD-10-CM

## 2021-07-07 DIAGNOSIS — R73.01 IMPAIRED FASTING GLUCOSE: ICD-10-CM

## 2021-07-07 DIAGNOSIS — D68.00 VON WILLEBRAND DISEASE: ICD-10-CM

## 2021-07-07 DIAGNOSIS — I25.10 CORONARY ARTERY DISEASE INVOLVING NATIVE CORONARY ARTERY OF NATIVE HEART WITHOUT ANGINA PECTORIS: ICD-10-CM

## 2021-07-07 DIAGNOSIS — I10 BENIGN ESSENTIAL HTN: Primary | ICD-10-CM

## 2021-07-07 DIAGNOSIS — E78.5 HYPERLIPIDEMIA, UNSPECIFIED HYPERLIPIDEMIA TYPE: ICD-10-CM

## 2021-07-07 DIAGNOSIS — R01.1 HEART MURMUR: ICD-10-CM

## 2021-07-07 DIAGNOSIS — F32.1 CURRENT MODERATE EPISODE OF MAJOR DEPRESSIVE DISORDER WITHOUT PRIOR EPISODE (HCC): ICD-10-CM

## 2021-07-07 PROCEDURE — 99214 OFFICE O/P EST MOD 30 MIN: CPT | Performed by: INTERNAL MEDICINE

## 2021-07-07 NOTE — PROGRESS NOTES
[facility]) Physicians  Internal Medicine  Patient Encounter  Bernardo Hooks D.O., [facility]        Chief Complaint   Patient presents with   Christine You    Medication Check       HPI: 79 y.o. male seen today requesting his routine checkup regarding the status of current chronic medical problems as the below along with a medication review and reconciliation. He continues to struggle with his hearing. He was to the audiologist recently to adjust his aides. Aides made a big difference. His vision is good since cataract surgery. He is due for second Shingrx and Tdap booster. He struggles with left knee pain. He has known OA. He is still able to mow grass. He has see Dr. Pamela Flanagan. He replaced the right knee. CAD--he denies any chest pain, shortness of breath, orthopnea, or edema. . He denies any syncopal episodes, palpitations or unusual fatigue. He sees Dr. Libia Lorenzo yearly. He was last seen 10/30/2021. Hypertension--Patient denies any problems with uncontrolled high blood pressure. He denies any headaches or dizziness. He denies any lightheadedness or syncope. He denies any increased swelling. 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 He has had to receive presurgical treatment for major surgeries with Humate-P. He denies any current bleeding or bruising. He denies any petechiae. He is under the care of hematology    Impaired fasting glucose--Patient denies any problems with excessive thirst or frequent urination. He denies any weight loss or blurry vision. Lab Results   Component Value Date    LABA1C 4.9 12/28/2020     Lab Results   Component Value Date    EAG 93.9 12/28/2020      Hyperlipidemia--Patient remains on Lipitor 80 mg daily. He denies any adverse side effects. He denies any myalgias or muscle cramping. Lab Results   Component Value Date    [address] 76 12/28/2020      Depression--Patient has been doing very well with the Lexapro.   His mood is mostly \"pretty good. \"  He has good days and not so good days. He has a better outlook. He is not as \"mopy. \"          Past Medical History:   Diagnosis Date    Acute ischemic stroke ([facility] 10/24/2014    Right    Anemia     Arthritis     rt knee    CAD (coronary artery disease) 05/29/2014    LAD, RCA, Diag - cariothoracic surgery    Cataracts, bilateral 07/2020    Colon polyps 02/03/2020    Tubular adenoma, Dr. Kyra Hutchins Depression     [place] (hard of hearing)     hermes-lateral hearing aids    HTN (hypertension)     Hyperlipidemia     Hypokalemia     Jaw dislocation     [facility]     Walking pneumonia          MEDICATIONS:  Medication Sig   escitalopram (LEXAPRO) 10 MG tablet TAKE 1 TABLET BY MOUTH EVERY DAY    lisinopril (PRINIVIL;ZESTRIL) 40 MG tablet TAKE 1 TABLET BY MOUTH ONE TIME A DAY    metoprolol succinate (TOPROL XL) 100 MG extended release tablet TAKE 1 TABLET BY MOUTH ONE TIME A DAY    atorvastatin (LIPITOR) 80 MG tablet TAKE 1 TABLET BY MOUTH ONE TIME A DAY   amLODIPine (NORVASC) 10 MG tablet TAKE 1 TABLET BY MOUTH EVERY DAY   hydroCHLOROthiazide (HYDRODIURIL) 25 MG tablet Take 1 tablet by mouth every morning   aspirin 81 MG tablet Take 81 mg by mouth daily. Review of Systems - As per HPI  GEN: Pt denies fever, chills or night sweats. Denies weight changes. Appetite good  HEENT: Pt denies visual and auditory changes, epistaxis, upper respiratory symptoms and dysphagia  CV: Pt denies CP, SOB, KING, orthopnea palpitations, LE swelling, and claudication. PULM: Pt denies cough, wheezing or sputum production  GI: Pt denies N/V/D, heart burn, rectal bleeding, or melena. NEURO: Pt denies unilateral weakness, paresthesia and dizziness. OBJECTIVE:  Vitals:    07/07/21 1019 07/07/21 1056   BP: (!) 156/60 138/60   Pulse: 70    Resp: 12    Weight: 190 lb (86.2 kg)    Height: 5' 9\" (1.753 m)      Body mass index is 28.06 kg/m².      Wt Readings from Last 3 Encounters:   07/07/21 190 lb (86.2 kg)   03/15/21 197 lb (89.4 kg)   10/30/20 197 lb 3.2 oz (89.4 kg)     BP Readings from Last 3 Encounters:   07/07/21 138/60   03/15/21 (!) 120/50   10/30/20 (!) 144/62      GEN: NAD, A&O, Non-toxic  HEENT: NC/AT, SALLY, EOMI, Oral cavity Clear,  TM's NL, Nasal cavity clear. NECK: Supple. No thyromegaly. No JVD  LYMPH: No C/SC nodes. CV: Reg rhythm. Rate NL.  + Occ. Ectopy. Soft, early systolic murmur. 2/6 DIASTOLIC Murmur. PULM: CTA  EXT: No edema  GI: Abdomen is soft, NT, BS+, No hepatomegaly. No masses. NEURO: No focal or lateralizing deficits. VASC:  No carotid bruits. Pulses symmetric  SKIN:  No rashes or lesions of concern  MS:  Left knee crepitus with ROM, + Varus deformity. ASSESSMENT/PLAN:    1. Benign essential HTN  Pressure was accelerated early on the appointment but did come down  Continue current medications  Follow-up with cardiology in October or November  Continue to follow a no added salt diet. Patient counseled  - Comprehensive Metabolic Panel; Future  - Lipid Panel; Future  - TSH without Reflex; Future  - CBC Auto Differential; Future    2. Impaired fasting glucose  Condition stability and control are uncertain. Due for lab  Continue efforts with Lifestyle modification including low calorie diet focusing on Low fat/low cholesterol and low carbohydrate intake, along with  increasing cardiovascular (aerobic) exercise. - Comprehensive Metabolic Panel; Future  - Hemoglobin A1C; Future    3. Hyperlipidemia, unspecified hyperlipidemia type  Condition stability and control are uncertain. Due for lab  Continue efforts with lifestyle approach  Continue statin therapy. He remains on high intensity dose Lipitor  Continue to monitor for adverse side effects  - Comprehensive Metabolic Panel; Future  - Lipid Panel; Future    4.  Coronary artery disease involving native coronary artery of native heart without angina pectoris  Condition is stable without signs of angina or anginal equivalents  Continue to monitor for cardiac decompensation.  - Lipid Panel; Future    5. Current moderate episode of major depressive disorder without prior episode (HCC)  Condition is improved on Lexapro  Continue current dose  Continue stress management  Monitor for worsening depression    6. Primary osteoarthritis of left knee  Condition is stable  Patient may want to revisit with his orthopedist    7. Von Willebrand disease ([address]  Condition is stable without signs of bleeding, petechiae. - CBC Auto Differential; Future    8. Heart murmur  Examination suggests aortic insufficiency  Recommend repeat echo at the time of his cardiology evaluation    9. Screening for prostate cancer    - PSA screening; Future      Discussed medications with patient who voiced understanding of their use, indication and potential side effects. Pt also understands the above recommendations. All questions answered. This note was generated completely or in part utilizing Dragon dictation speech recognition software. Occasionally, words are mistranscribed and despite editing, the text may contain inaccuracies due to incorrect word recognition.   If further clarification is needed please contact the office at [phone]       Electronically signed    Melissa Harry D.O.

## 2021-07-07 NOTE — PATIENT INSTRUCTIONS
To do list:      #1 obtain your second Shingrix vaccine    #2 obtain the Tdap (Boostrix) vaccine booster    #3 echocardiogram due at cardiology follow-up

## 2021-07-13 ENCOUNTER — TELEPHONE (OUTPATIENT)
Dept: INTERNAL MEDICINE CLINIC | Age: 71
End: 2021-07-13

## 2021-07-13 NOTE — TELEPHONE ENCOUNTER
Patient is returning a call from our office regarding lab results. Please call back at number provided to discuss results.

## 2021-10-01 ENCOUNTER — TELEPHONE (OUTPATIENT)
Dept: INTERNAL MEDICINE CLINIC | Age: 71
End: 2021-10-01

## 2021-10-01 RX ORDER — METOPROLOL SUCCINATE 100 MG/1
TABLET, EXTENDED RELEASE ORAL
Qty: 90 TABLET | Refills: 0 | Status: SHIPPED | OUTPATIENT
Start: 2021-10-01 | End: 2022-03-07

## 2021-10-01 RX ORDER — LISINOPRIL 40 MG/1
TABLET ORAL
Qty: 90 TABLET | Refills: 0 | Status: SHIPPED | OUTPATIENT
Start: 2021-10-01 | End: 2022-03-07

## 2021-10-19 NOTE — PROGRESS NOTES
[facility]      Cardiology Consult    Tima Hockey  1950 October 26, 2021    Primary Physician: Dr. Roshan Corea  Reason for Visit: CAD s/p CABG    CC: \"Feeling pretty good. \"     HPI:  The patient is 70 y.o. male with Fredia Clause disease and CAD s/p CABG, aortic insufficiency, hypertension, and hyperlipidemia who presents for management of CAD. He originally presented with typical exertional angina chest pain. Angiogram showed multivessel disease and the patient had bypass surgery. His post-operative course was uncomplicated. Later, he had a total right knee replacement which was complicated by a small CVA. He denies any residual deficit from the CVA. Today, he states he is doing well and denies any new cardiac sounding complaints. He denies any chest pains or worsening shortness of breath. He denies any worsening LE edema, weight gain, or syncope. He reports compliance with his medications and tolerating. He denies any abnormal bleeding or bruising. Patient denies exertional chest pain/pressure, dyspnea at rest, worsening KING, PND, orthopnea, palpitations, lightheadedness, weight changes, changes in LE edema, and syncope.     Past Medical History:   Diagnosis Date    Acute ischemic stroke ([address] 10/24/2014    Right    Anemia     Arthritis     rt knee    CAD (coronary artery disease) 05/29/2014    LAD, RCA, Diag - cariothoracic surgery    Cataracts, bilateral 07/2020    Colon polyps 02/03/2020    Tubular adenoma, Dr. Qasim [place] (hard of hearing)     hermes-lateral hearing aids    HTN (hypertension)     Hyperlipidemia     Hypokalemia     Jaw dislocation     Von Willebrand disease ([address]     Walking pneumonia      Past Surgical History:   Procedure Laterality Date    COLONOSCOPY  02/03/2020    Dr. Nacho Key N/A 2/3/2020    COLONOSCOPY POLYPECTOMY SNARE/COLD BIOPSY performed by Kiran Downs MD at [address] [place] [address] BYPASS GRAFT      2015    INTRACAPSULAR CATARACT EXTRACTION Right 7/31/2020    Phacoemulsification with intraocular lens implant performed by Shai Jacobo MD at [place] [address] Left 8/6/2020    Phacoemulsification with intraocular lens implant performed by Shai Jacobo MD at [address] [place] [address] Right 10/21/14    right total knee replacement    TONSILLECTOMY AND ADENOIDECTOMY  1955     Family History   Problem Relation Age of Onset    Other Mother 35        blood clot    High Blood Pressure Father     Heart Disease Father 54        CAD, CABG    Clotting Disorder Sister         Von Willebrand's     Social History     Tobacco Use    Smoking status: Never Smoker    Smokeless tobacco: Never Used   Vaping Use    Vaping Use: Never used   Substance Use Topics    Alcohol use: Yes     Alcohol/week: 3.0 standard drinks     Types: 3 Cans of beer per week     Comment: rare    Drug use: No     No Known Allergies  Current Outpatient Medications   Medication Sig Dispense Refill    lisinopril (PRINIVIL;ZESTRIL) 40 MG tablet TAKE 1 TABLET BY MOUTH EVERY DAY  90 tablet 0    metoprolol succinate (TOPROL XL) 100 MG extended release tablet TAKE 1 TABLET BY MOUTH EVERY DAY  90 tablet 0    atorvastatin (LIPITOR) 80 MG tablet TAKE 1 TABLET BY MOUTH ONE TIME A DAY 90 tablet 3    amLODIPine (NORVASC) 10 MG tablet TAKE 1 TABLET BY MOUTH EVERY DAY 90 tablet 3    aspirin 81 MG tablet Take 81 mg by mouth daily.  escitalopram (LEXAPRO) 10 MG tablet TAKE 1 TABLET BY MOUTH EVERY DAY  (Patient not taking: Reported on 10/26/2021) 30 tablet 0    hydroCHLOROthiazide (HYDRODIURIL) 25 MG tablet Take 1 tablet by mouth every morning (Patient not taking: Reported on 10/26/2021) 90 tablet 1     No current facility-administered medications for this visit. Review of Systems:  · Constitutional: No unanticipated weight loss.  There's been no change in energy level, sleep pattern, or activity level. No fevers, chills. · Eyes: No visual changes or diplopia. No scleral icterus. · ENT: No Headaches, hearing loss or vertigo. No mouth sores or sore throat. · Cardiovascular: as reviewed in HPI  · Respiratory: No cough or wheezing, no sputum production. No hemoptysis. · Gastrointestinal: No abdominal pain, appetite loss, blood in stools. No change in bowel or bladder habits. · Genitourinary: No dysuria, trouble voiding, or hematuria. · Musculoskeletal:  No gait disturbance, no joint complaints. · Integumentary: No rash or pruritis. · Neurological: No headache, diplopia, change in muscle strength, numbness or tingling. · Psychiatric: No anxiety or depression. · Endocrine: No temperature intolerance. No excessive thirst, fluid intake, or urination. No tremor. · Hematologic/Lymphatic: No abnormal bruising or bleeding, blood clots or swollen lymph nodes. · Allergic/Immunologic: No nasal congestion or hives. Physical Exam:   /74   Pulse 66   Ht 5' 9\" (1.753 m)   Wt 179 lb (81.2 kg)   SpO2 96%   BMI 26.43 kg/m²   Wt Readings from Last 3 Encounters:   10/26/21 179 lb (81.2 kg)   07/07/21 190 lb (86.2 kg)   03/15/21 197 lb (89.4 kg)     Constitutional: He is oriented to person, place, and time. He appears well-developed and well-nourished. In no acute distress. Head: Normocephalic and atraumatic. Pupils equal and round. Neck: Neck supple. No JVP or carotid bruit appreciated. No mass and no thyromegaly present. No lymphadenopathy present. Cardiovascular: Normal rate, normal heart sounds and intact distal pulses. Exam reveals no gallop and no friction rub. III/VI diastolic murmur heard. Pulmonary/Chest: Effort normal and breath sounds normal. No respiratory distress. He has no wheezes, rhonchi or rales. Well healed median sternotomy scar. Abdominal: Soft, non-tender. Bowel sounds are normal. He exhibits no organomegaly, mass or bruit.    Extremities: No edema. No cyanosis or clubbing. Pulses are 2+ radial/dorsalis pedis and posterior tibial bilaterally. S/p R-TKR. Neurological: He is alert and oriented to person, place, and time. No gross cranial nerve deficit. Coordination normal.   Skin: Skin is warm and dry. There is no rash or diaphoresis. Psychiatric: He has a normal mood and affect. His speech is normal and behavior is normal.     Lab Review:   FLP:    Lab Results   Component Value Date    TRIG 68 07/07/2021    HDL 47 07/07/2021    LDLCALC 57 07/07/2021    LABVLDL 14 07/07/2021     BUN/Creatinine:    Lab Results   Component Value Date    BUN 10 07/07/2021    CREATININE 0.8 07/07/2021     EKG Interpretation: 5/16/14 Sinus rhythm. LVH with QRS widening. 10/11/18 Sinus  Bradycardia. Voltage criteria for LVH. IVCD  10/17/19 Sinus rhythm. Voltage criteria for LVH. IVCD. 10/26/21 Sinus rhythm. Voltage criteria for LVH. Nonspecific ST abnormality and QRS widening. Image Review:   Lexiscan: 6/2014  Abnormal study with a reversible defect of the apical anterior wall and apex consistent with ischemia. There is also a fixed defect of the basal to mid inferior wall. 2) The LV is severely dilated with moderately reduced systolic function (EF 07%). There is moderate diffuse hypokinesis and severe hypokinesis of the apex. 3) The ECG portion of the stress test is abnormal with significant ST depression which occurs late into recovery. 4) Uncontrolled hypertension. Echo 5/20/14  Normal left ventricle size. Concentric left ventricular hypertrophy. Normal systolic function with an estimated ejection fraction of 50-55%. No regional wall motion abnormalities are seen. Diastolic dysfunction is present. Trivial mitral regurgitation is present. Mild aortic regurgitation. Trivial tricuspid regurgitation. Cath: 5/29/14   1. The left main trifurcates into a left anterior descending, a ramus intermedius and a left circumflex artery.  The left main was angiographically free of significant disease. 2. The left anterior descending is a normal caliber vessel that follows a normal anatomical course. There was a complex bifurcating lesion in the proximal segment that involved the first diagonal branch. Again in this proximal segment at the level of the diagonal branch there was a 90% stenosis as well as a 90% stenosis immediately after the diagonal branch. The diagonal branch itself is a large bifurcating vessel; the upper limb of the vessel appears angiographically free of significant disease. The lower pole   of this bifurcating left anterior descending has sequential 95% stenoses. The remainder of the mid LAD and distal LAD have only minimal disease but no focal obstructive defects. 3. The ramus intermedius is a large caliber vessel. There is a 30% to 40% stenosis in the proximal segment of this vessel. 4. The left circumflex artery is a moderate size vessel. There is 1 small-to-moderate size obtuse marginal branch. The left circumflex and obtuse marginal have mild luminal irregularities but no significant obstructive defects. 5. The right coronary artery is a large dominant vessel supplying the PDA. The right coronary artery has a focal 95% stenosis in the proximal segment. CABG: LIMA->LAD, SVG->D2->D1, SVG->distal RCA. Echo 10/27/14  Extremely poor quality images. Definity contrast used to visualize left ventricle. Normal left ventricular size and systolic function. LV ejection fraction is visually estimated to be 50%. Normal right ventricular size and function. The valvular structures are not well seen but there are no significant valvular abnormalities by Doppler. No intracardiac source for embolus identified. Echo 7/11/2017  Left ventricle size is normal. Mild concentric left ventricular hypertrophy is present. Global ejection fraction is mildly decreased and estimated at approximately 45 to 50%. No regional wall motion abnormalities are noted.  Normal diastolic function. The left atrium is mildly dilated. The aortic valve is mildly thickened, opens well with normal gradients. Moderate aortic regurgitation is present. There is mild tricuspid regurgitation with RVSP estimated at 33 mmHg. Estimated right atrial pressure is 5 mmHg. Echo 11/1/19  Concentric LVH with normal LV size and wall motion. EF is  50-55%. The left atrium is dilated. Mild Mitral, Aortic and Tricuspid Regurgitation. Normal right ventricular size and function. Doppler derived Pulmonary artery systolic pressure (PASP) is  34 mmHg . Assessment/Plan:   1) CAD s/p CABG. Asymptomatic. Continue with medical management and risk factor modification including aspirin, statin, and Toprol  mg daily. 2) Essential hypertension. Controlled. Goal BP <130/80. Continue current medical therapy. 3) Hyperlipidemia. LDL 57. Continue Lipitor to 80 mg daily. 4) Aortic insufficiency. Continue to monitor clinically and last echo 11/2019 showed mild aortic regurgitation. Will repeat the echocardiogram to assess for any progression of valvular disease. 5) Von Willebrand disease. Tolerating ASA 81mg daily. 6) CVA. Continue ASA and statin. Follow up in 1 year. Thank you very much for allowing me to participate in the care of your patient. Please do not hesitate to contact me if you have any questions. Sincerely,  Johanna Spears. Miri Anguiano, [address] [place] [address]  Ph: [phone]  Fax: [phone]    This note was scribed in the presence of Dr Miri Anguiano MD by Radha Godinez RN. Physician Attestation: The scribes documentation has been prepared under my direction and personally reviewed by me in its entirety. I confirm that the note above accurately reflects all work, treatment, procedures, and medical decision making performed by me.  All portions of the note including but not limited to the chief complaint, history of present illness, physical exam, assessment and plan/medical decision making were personally reviewed, edited, and updated on the day of the visit.

## 2021-10-19 NOTE — PATIENT INSTRUCTIONS
Patient Education        DASH Diet: Care Instructions  Your Care Instructions     The DASH diet is an eating plan that can help lower your blood pressure. DASH stands for Dietary Approaches to Stop Hypertension. Hypertension is high blood pressure. The DASH diet focuses on eating foods that are high in calcium, potassium, and magnesium. These nutrients can lower blood pressure. The foods that are highest in these nutrients are fruits, vegetables, low-fat dairy products, nuts, seeds, and legumes. But taking calcium, potassium, and magnesium supplements instead of eating foods that are high in those nutrients does not have the same effect. The DASH diet also includes whole grains, fish, and poultry. The DASH diet is one of several lifestyle changes your doctor may recommend to lower your high blood pressure. Your doctor may also want you to decrease the amount of sodium in your diet. Lowering sodium while following the DASH diet can lower blood pressure even further than just the DASH diet alone. Follow-up care is a key part of your treatment and safety. Be sure to make and go to all appointments, and call your doctor if you are having problems. It's also a good idea to know your test results and keep a list of the medicines you take. How can you care for yourself at home? Following the DASH diet  · Eat 4 to 5 servings of fruit each day. A serving is 1 medium-sized piece of fruit, ½ cup chopped or canned fruit, 1/4 cup dried fruit, or 4 ounces (½ cup) of fruit juice. Choose fruit more often than fruit juice. · Eat 4 to 5 servings of vegetables each day. A serving is 1 cup of lettuce or raw leafy vegetables, ½ cup of chopped or cooked vegetables, or 4 ounces (½ cup) of vegetable juice. Choose vegetables more often than vegetable juice. · Get 2 to 3 servings of low-fat and fat-free dairy each day. A serving is 8 ounces of milk, 1 cup of yogurt, or 1 ½ ounces of cheese. · Eat 6 to 8 servings of grains each day. A serving is 1 slice of bread, 1 ounce of dry cereal, or ½ cup of cooked rice, pasta, or cooked cereal. Try to choose whole-grain products as much as possible. · Limit lean meat, poultry, and fish to 2 servings each day. A serving is 3 ounces, about the size of a deck of cards. · Eat 4 to 5 servings of nuts, seeds, and legumes (cooked dried beans, lentils, and split peas) each week. A serving is 1/3 cup of nuts, 2 tablespoons of seeds, or ½ cup of cooked beans or peas. · Limit fats and oils to 2 to 3 servings each day. A serving is 1 teaspoon of vegetable oil or 2 tablespoons of salad dressing. · Limit sweets and added sugars to 5 servings or less a week. A serving is 1 tablespoon jelly or jam, ½ cup sorbet, or 1 cup of lemonade. · Eat less than 2,300 milligrams (mg) of sodium a day. If you limit your sodium to 1,500 mg a day, you can lower your blood pressure even more. · Be aware that all of these are the suggested number of servings for people who eat 1,800 to 2,000 calories a day. Your recommended number of servings may be different if you need more or fewer calories. Tips for success  · Start small. Do not try to make dramatic changes to your diet all at once. You might feel that you are missing out on your favorite foods and then be more likely to not follow the plan. Make small changes, and stick with them. Once those changes become habit, add a few more changes. · Try some of the following:  ? Make it a goal to eat a fruit or vegetable at every meal and at snacks. This will make it easy to get the recommended amount of fruits and vegetables each day. ? Try yogurt topped with fruit and nuts for a snack or healthy dessert. ? Add lettuce, tomato, cucumber, and onion to sandwiches. ? Combine a ready-made pizza crust with low-fat mozzarella cheese and lots of vegetable toppings. Try using tomatoes, squash, spinach, broccoli, carrots, cauliflower, and onions. ?  Have a variety of cut-up vegetables with a low-fat dip as an appetizer instead of chips and dip. ? Sprinkle sunflower seeds or chopped almonds over salads. Or try adding chopped walnuts or almonds to cooked vegetables. ? Try some vegetarian meals using beans and peas. Add garbanzo or kidney beans to salads. Make burritos and tacos with mashed chaves beans or black beans. Where can you learn more? Go to https://[employer]. org and sign in to your [employer] account. Enter R453 in the [employer] box to learn more about \"DASH Diet: Care Instructions. \"     If you do not have an account, please click on the \"Sign Up Now\" link. Current as of: April 29, 2021               Content Version: 13.0  © 6145-5006 Healthwise, Incorporated. Care instructions adapted under license by [facility] ([facility]). If you have questions about a medical condition or this instruction, always ask your healthcare professional. [address] any warranty or liability for your use of this information.

## 2021-10-26 ENCOUNTER — OFFICE VISIT (OUTPATIENT)
Dept: CARDIOLOGY CLINIC | Age: 71
End: 2021-10-26
Payer: MEDICARE

## 2021-10-26 VITALS
SYSTOLIC BLOOD PRESSURE: 134 MMHG | BODY MASS INDEX: 26.51 KG/M2 | HEIGHT: 69 IN | OXYGEN SATURATION: 96 % | HEART RATE: 66 BPM | DIASTOLIC BLOOD PRESSURE: 74 MMHG | WEIGHT: 179 LBS

## 2021-10-26 DIAGNOSIS — D68.00 VON WILLEBRAND DISEASE: ICD-10-CM

## 2021-10-26 DIAGNOSIS — Z86.73 H/O: CVA (CEREBROVASCULAR ACCIDENT): ICD-10-CM

## 2021-10-26 DIAGNOSIS — E78.2 MIXED HYPERLIPIDEMIA: ICD-10-CM

## 2021-10-26 DIAGNOSIS — I10 ESSENTIAL HYPERTENSION: ICD-10-CM

## 2021-10-26 DIAGNOSIS — I35.1 NONRHEUMATIC AORTIC VALVE INSUFFICIENCY: ICD-10-CM

## 2021-10-26 DIAGNOSIS — Z95.1 S/P CABG (CORONARY ARTERY BYPASS GRAFT): ICD-10-CM

## 2021-10-26 DIAGNOSIS — I25.10 CORONARY ARTERY DISEASE INVOLVING NATIVE CORONARY ARTERY OF NATIVE HEART WITHOUT ANGINA PECTORIS: Primary | ICD-10-CM

## 2021-10-26 PROCEDURE — 93000 ELECTROCARDIOGRAM COMPLETE: CPT | Performed by: INTERNAL MEDICINE

## 2021-10-26 PROCEDURE — 99214 OFFICE O/P EST MOD 30 MIN: CPT | Performed by: INTERNAL MEDICINE

## 2021-11-04 ENCOUNTER — HOSPITAL ENCOUNTER (OUTPATIENT)
Dept: NON INVASIVE DIAGNOSTICS | Age: 71
Discharge: HOME | End: 2021-11-04
Payer: MEDICARE

## 2021-11-04 DIAGNOSIS — I25.10 CORONARY ARTERY DISEASE INVOLVING NATIVE CORONARY ARTERY OF NATIVE HEART WITHOUT ANGINA PECTORIS: ICD-10-CM

## 2021-11-04 DIAGNOSIS — Z95.1 S/P CABG (CORONARY ARTERY BYPASS GRAFT): ICD-10-CM

## 2021-11-04 DIAGNOSIS — I35.1 NONRHEUMATIC AORTIC VALVE INSUFFICIENCY: ICD-10-CM

## 2021-11-04 LAB
LV EF: 55 %
LVEF MODALITY: NORMAL

## 2021-11-04 PROCEDURE — 93306 TTE W/DOPPLER COMPLETE: CPT

## 2021-12-21 ENCOUNTER — OFFICE VISIT (OUTPATIENT)
Dept: INTERNAL MEDICINE CLINIC | Age: 71
End: 2021-12-21
Payer: MEDICARE

## 2021-12-21 VITALS
BODY MASS INDEX: 26.36 KG/M2 | HEART RATE: 75 BPM | DIASTOLIC BLOOD PRESSURE: 64 MMHG | SYSTOLIC BLOOD PRESSURE: 130 MMHG | RESPIRATION RATE: 14 BRPM | HEIGHT: 69 IN | OXYGEN SATURATION: 96 % | WEIGHT: 178 LBS

## 2021-12-21 DIAGNOSIS — R73.01 IMPAIRED FASTING GLUCOSE: ICD-10-CM

## 2021-12-21 DIAGNOSIS — H90.3 SENSORINEURAL HEARING LOSS (SNHL) OF BOTH EARS: ICD-10-CM

## 2021-12-21 DIAGNOSIS — I25.10 CORONARY ARTERY DISEASE INVOLVING NATIVE CORONARY ARTERY OF NATIVE HEART WITHOUT ANGINA PECTORIS: ICD-10-CM

## 2021-12-21 DIAGNOSIS — E78.5 HYPERLIPIDEMIA, UNSPECIFIED HYPERLIPIDEMIA TYPE: ICD-10-CM

## 2021-12-21 DIAGNOSIS — I35.1 AORTIC VALVE INSUFFICIENCY, ETIOLOGY OF CARDIAC VALVE DISEASE UNSPECIFIED: ICD-10-CM

## 2021-12-21 DIAGNOSIS — Z23 NEED FOR PROPHYLACTIC VACCINATION AGAINST DIPHTHERIA-TETANUS-PERTUSSIS (DTP): ICD-10-CM

## 2021-12-21 DIAGNOSIS — Z13.6 SCREENING FOR CARDIOVASCULAR CONDITION: ICD-10-CM

## 2021-12-21 DIAGNOSIS — Z00.00 ROUTINE GENERAL MEDICAL EXAMINATION AT A HEALTH CARE FACILITY: Primary | ICD-10-CM

## 2021-12-21 DIAGNOSIS — Z13.1 SCREENING FOR DIABETES MELLITUS: ICD-10-CM

## 2021-12-21 PROCEDURE — G0446 INTENS BEHAVE THER CARDIO DX: HCPCS | Performed by: INTERNAL MEDICINE

## 2021-12-21 PROCEDURE — G0439 PPPS, SUBSEQ VISIT: HCPCS | Performed by: INTERNAL MEDICINE

## 2021-12-21 ASSESSMENT — LIFESTYLE VARIABLES
HOW OFTEN DURING THE LAST YEAR HAVE YOU FOUND THAT YOU WERE NOT ABLE TO STOP DRINKING ONCE YOU HAD STARTED: 0
HOW OFTEN DURING THE LAST YEAR HAVE YOU NEEDED AN ALCOHOLIC DRINK FIRST THING IN THE MORNING TO GET YOURSELF GOING AFTER A NIGHT OF HEAVY DRINKING: 0
HOW MANY STANDARD DRINKS CONTAINING ALCOHOL DO YOU HAVE ON A TYPICAL DAY: 0
HAS A RELATIVE, FRIEND, DOCTOR, OR ANOTHER HEALTH PROFESSIONAL EXPRESSED CONCERN ABOUT YOUR DRINKING OR SUGGESTED YOU CUT DOWN: 0
HOW OFTEN DURING THE LAST YEAR HAVE YOU HAD A FEELING OF GUILT OR REMORSE AFTER DRINKING: 0
HOW OFTEN DURING THE LAST YEAR HAVE YOU BEEN UNABLE TO REMEMBER WHAT HAPPENED THE NIGHT BEFORE BECAUSE YOU HAD BEEN DRINKING: 0
HAVE YOU OR SOMEONE ELSE BEEN INJURED AS A RESULT OF YOUR DRINKING: 0
HOW OFTEN DURING THE LAST YEAR HAVE YOU FAILED TO DO WHAT WAS NORMALLY EXPECTED FROM YOU BECAUSE OF DRINKING: 0
HOW OFTEN DO YOU HAVE A DRINK CONTAINING ALCOHOL: 2
HOW OFTEN DO YOU HAVE SIX OR MORE DRINKS ON ONE OCCASION: 0
AUDIT-C TOTAL SCORE: 2
AUDIT TOTAL SCORE: 2

## 2021-12-21 ASSESSMENT — PATIENT HEALTH QUESTIONNAIRE - PHQ9
SUM OF ALL RESPONSES TO PHQ QUESTIONS 1-9: 2
1. LITTLE INTEREST OR PLEASURE IN DOING THINGS: 1
2. FEELING DOWN, DEPRESSED OR HOPELESS: 1
SUM OF ALL RESPONSES TO PHQ QUESTIONS 1-9: 2
SUM OF ALL RESPONSES TO PHQ QUESTIONS 1-9: 2
SUM OF ALL RESPONSES TO PHQ9 QUESTIONS 1 & 2: 2

## 2021-12-21 NOTE — PROGRESS NOTES
[facility]) Physicians  Internal Medicine  Patient Encounter  Sharmin Orta D.O., [facility]       Medicare Annual Wellness Visit  Name: Fantasma De Souza Date: 2021   MRN: <[id number]> Sex: Male   Age: 70 y.o. Ethnicity: Non- / Non    : 1950 Race: White (non-)      Roxi Veras is here for Medicare AWV    Screenings for behavioral, psychosocial and functional/safety risks, and cognitive dysfunction are all negative except as indicated below. These results, as well as other patient data from the [address] form, are documented in Flowsheets linked to this Encounter.     Medical/Surgical Histories     Past Medical History:   Diagnosis Date    Acute ischemic stroke ([facility] [address] 10/24/2014    Right    Anemia     Arthritis     rt knee    CAD (coronary artery disease) 2014    LAD, RCA, Diag - cariothoracic surgery    Cataracts, bilateral 2020    Colon polyps 2020    Tubular adenoma, Dr. Kamryn INGRAM (hard of hearing)     hermes-lateral hearing aids    HTN (hypertension)     Hyperlipidemia     Hypokalemia     Jaw dislocation     Von Willebrand disease ([facility] [address]     Walking pneumonia           Past Surgical History:   Procedure Laterality Date    COLONOSCOPY  2020    Dr. Dacia Funes N/A 2/3/2020    COLONOSCOPY POLYPECTOMY SNARE/COLD BIOPSY performed by Nellie Curry MD at [facility]          INTRACAPSULAR CATARACT EXTRACTION Right 2020    Phacoemulsification with intraocular lens implant performed by Marie Santiago MD at [place] [address] Left 2020    Phacoemulsification with intraocular lens implant performed by Marie Santiago MD at [address] [place] [address] Right 10/21/14    right total knee replacement   [facility]           Medications/Allergies     Medication Sig    Tdap (ADACEL) 5-2-15.5 LF-MCG/0.5 injection Inject 0.5 mLs into the muscle once for 1 dose    lisinopril (PRINIVIL;ZESTRIL) 40 MG tablet TAKE 1 TABLET BY MOUTH EVERY DAY     metoprolol succinate (TOPROL XL) 100 MG extended release tablet TAKE 1 TABLET BY MOUTH EVERY DAY     atorvastatin (LIPITOR) 80 MG tablet TAKE 1 TABLET BY MOUTH ONE TIME A DAY    amLODIPine (NORVASC) 10 MG tablet TAKE 1 TABLET BY MOUTH EVERY DAY    aspirin 81 MG tablet Take 81 mg by mouth daily. No current facility-administered medications for this visit. No Known Allergies      Substance Use History     Social History     Tobacco Use    Smoking status: Never Smoker    Smokeless tobacco: Never Used   Vaping Use    Vaping Use: Never used   Substance Use Topics    Alcohol use: Yes     Alcohol/week: 3.0 standard drinks     Types: 3 Cans of beer per week     Comment: rare    Drug use: No          Family History     Family History   Problem Relation Age of Onset    Other Mother 35        blood clot    High Blood Pressure Father     Heart Disease Father 54        CAD, CABG    Clotting Disorder Sister         Luanne Lucia             [facility] (Including outside providers/suppliers regularly involved in providing care):   Patient Care Team:  Kylee Hill DO as PCP - General (Internal Medicine)  Kylee Hill DO as PCP - [facility]  Charly Massey MD as Consulting Physician (Cardiothoracic Surgery)  Arash Alarcon MD as Consulting Physician (Hematology and Oncology)  Ebony Aguirre MD as Consulting Physician (Gastroenterology)  DEIRDRE Junior (Physician Assistant)      Based upon direct observation of the patient, evaluation of cognition reveals recent and remote memory intact. ROS:  MS:  Left knee pain. Able to walk. May need replacement in the future. CV:  Saw Dr. Mario Adan. Echo 11/4/2021--->  Summary   Moderately dilated LV size, wall thickness and motion.  EF is 55%. Normal   diastolic function. Mild mitral regurgitation. The left atrium is severely dilated. Moderate aortic regurgitation. Mildly dilated right ventricle. Right ventricular systolic function is   normal.   Mild tricuspid regurgitation. Estimated pulmonary artery systolic pressure is normal at 31 mmHg assuming a   right atrial pressure of 3 mmHg. AI is a little worse. He should have another Echo in 1 year      Physical Exam    Vitals:    12/21/21 1008 12/21/21 1017   BP: (!) 168/62 130/64   Site: Right Upper Arm Left Upper Arm   Position: Sitting Sitting   Pulse: 75    Resp: 14    SpO2: 96%    Weight: 178 lb (80.7 kg)    Height: 5' 9\" (1.753 m)      Body mass index is 26.29 kg/m². Wt Readings from Last 3 Encounters:   12/21/21 178 lb (80.7 kg)   10/26/21 179 lb (81.2 kg)   07/07/21 190 lb (86.2 kg)     BP Readings from Last 3 Encounters:   12/21/21 130/64   10/26/21 134/74   07/07/21 138/60      GEN:  70 y.o. male who is in NAD, A&O. He appears stated age and well nourished. He is cooperative and pleasant. HEAD:  NC/AT, no lesions. EYES:  MALKA, EOMI, No scleral icterus or conjunctival injection or discharge. Visual fields in tact to confrontation. EARS:  EAC's clear, TM's normal.  NECK:  Supple. Full ROM. Trachea is midline. No increased JVD. No thyromegaly or nodules. No masses  LYMPH: No C/SC/A/F nodes  CARDIAC:  S1S2 NL. Regular rhythm.  + 8-0/7 DIASTOLIC MURMUR. VASC:  Pedal pulses 2/4. Carotid upstrokes 2+. No bruits noted. PULM:  Lungs are CTA. Symmetric breath sounds noted. AP Diameter NL. GI:  Abdomen is soft and nontender. No distension. No organomegaly. No masses. No pulsatile masses. EXT:  No Cyanosis or clubbing. No edema. SKIN: Warm and dry, normal turgor, no rash or lesions of concern. NEURO:  No focal deficits. MS: Left knee with crepitus with ROM. PSYCH:  Mood and affect NL. Judgement and insight NL.      Patient's complete Health done   [employer] Visit (AWV)  12/17/2021    Lipid screen  07/07/2022    Potassium monitoring  07/07/2022    Creatinine monitoring  07/07/2022    Colon cancer screen colonoscopy  02/03/2030    Flu vaccine  Completed    Shingles Vaccine  Completed    Pneumococcal 65+ years Vaccine  Completed    COVID-19 Vaccine  Completed    Hepatitis C screen  Completed    Hepatitis A vaccine  Aged Out    Hepatitis B vaccine  Aged Out    Hib vaccine  Aged Out    Meningococcal (ACWY) vaccine  Aged Out     Recommendations for [employer] Due: see orders and patient instructions/AVS.  . Recommended screening schedule for the next 5-10 years is provided to the patient in written form: see Patient Ruth Knott was seen today for medicare awv. Diagnoses and all orders for this visit:    Routine general medical examination at a health care facility  --All care gaps identified and addressed  --Prescription given for Tdap booster  -     Tdap (ADACEL) 5-2-15.5 LF-MCG/0.5 injection; Inject 0.5 mLs into the muscle once for 1 dose  -     IL Intens behave ther cardio dx, 15 minutes []  -     Comprehensive Metabolic Panel; Future  -     Lipid Panel; Future  -     Hemoglobin A1C; Future  -     CBC Auto Differential; Future    Screening for cardiovascular condition  -     IL Intens behave ther cardio dx, 15 minutes []    Need for prophylactic vaccination against diphtheria-tetanus-pertussis (DTP)  -     Tdap (ADACEL) 5-2-15.5 LF-MCG/0.5 injection; Inject 0.5 mLs into the muscle once for 1 dose    Sensorineural hearing loss (SNHL) of both ears  --Condition is severe  --Recommended he obtain a telephone that transcribes speech. Patient agreed this was a good idea    Impaired fasting glucose  -     Comprehensive Metabolic Panel; Future  -     Hemoglobin A1C; Future    Screening for diabetes mellitus  -     Comprehensive Metabolic Panel;  Future  -     Hemoglobin A1C; Future    Hyperlipidemia, unspecified hyperlipidemia type  -     Comprehensive Metabolic Panel; Future  -     Lipid Panel; Future    Coronary artery disease involving native coronary artery of native heart without angina pectoris  -     Lipid Panel; Future    Aortic valve insufficiency, etiology of cardiac valve disease unspecified  --Patient with a prominent diastolic murmur.   --Repeat echo next year as the AI appears a little worse                 Cardiovascular Disease Risk Counseling: Assessed the patient's risk to develop cardiovascular disease and reviewed main risk factors. Reviewed steps to reduce disease risk including:   · Quitting tobacco use, reducing amount smoked, or not starting the habit  · Making healthy food choices  · Being physically active and gradualy increasing activity levels   · Reduce weight and determine a healthy BMI goal  · Monitor blood pressure and treat if higher than 140/90 mmHg  · Maintain blood total cholesterol levels under 5 mmol/l or 190 mg/dl  · Maintain LDL cholesterol levels under 3.0 mmol/l or 115 mg/dl   · Control blood glucose levels  · Consider taking aspirin (75 mg daily), once blood pressure is controlled   Provided a follow up plan.   Time spent (minutes): 15

## 2021-12-21 NOTE — PATIENT INSTRUCTIONS
Personalized Preventive Plan for Jessa Chiu - 12/21/2021  Medicare offers a range of preventive health benefits. Some of the tests and screenings are paid in full while other may be subject to a deductible, co-insurance, and/or copay. Some of these benefits include a comprehensive review of your medical history including lifestyle, illnesses that may run in your family, and various assessments and screenings as appropriate. After reviewing your medical record and screening and assessments performed today your provider may have ordered immunizations, labs, imaging, and/or referrals for you. A list of these orders (if applicable) as well as your Preventive Care list are included within your After Visit Summary for your review. Other Preventive Recommendations:    · A preventive eye exam performed by an eye specialist is recommended every 1-2 years to screen for glaucoma; cataracts, macular degeneration, and other eye disorders. · A preventive dental visit is recommended every 6 months. · Try to get at least 150 minutes of exercise per week or 10,000 steps per day on a pedometer . · Order or download the FREE \"Exercise & Physical Activity: Your Everyday Guide\" from [employer]. Call 9-641.306.5411 or search [employer] online. · You need 9792-6727 mg of calcium and 2594-5847 IU of vitamin D per day. It is possible to meet your calcium requirement with diet alone, but a vitamin D supplement is usually necessary to meet this goal.  · When exposed to the sun, use a sunscreen that protects against both UVA and UVB radiation with an SPF of 30 or greater. Reapply every 2 to 3 hours or after sweating, drying off with a towel, or swimming. · Always wear a seat belt when traveling in a car.    · Recommend finding a phone that transcribes speech  Patient Education        Aortic Valve Regurgitation: Care Instructions  Overview     The aortic valve works like a one-way gate. It opens so that blood can leave the heart and flow to the rest of the body. When the heart rests between beats, the aortic valve closes to keep blood from flowing backward into the heart. When the aortic valve does not close properly, some of the blood leaks back (regurgitates) through the valve into the heart. Then your heart has to work harder to pump blood throughout your body. You can have this condition for many years before it gets worse and you have symptoms. Your doctor will monitor your heart. You may take medicine to lower blood pressure or help relieve symptoms. If the disease becomes severe, you may choose to have surgery to replace the valve. Follow-up care is a key part of your treatment and safety. Be sure to make and go to all appointments, and call your doctor if you are having problems. It's also a good idea to know your test results and keep a list of the medicines you take. How can you care for yourself at home? Be safe with medicines. Take your medicines exactly as prescribed. Call your doctor if you think you're having a problem with your medicine. Call your doctor if you have new symptoms or your symptoms get worse. Eat heart-healthy foods. These include vegetables, fruits, nuts, beans, lean meat, fish, and whole grains. Limit sodium, alcohol, and sugar. Be active. Ask your doctor what type and level of exercise is safe for you. Don't smoke. Stay at a healthy weight. Lose weight if you need to. Manage other health problems. If you think you may have a problem with alcohol or drug use, talk to your doctor. Avoid colds and flu. Get a pneumococcal vaccine shot if you need to. Get a flu vaccine every year. Take care of your teeth and gums. Get regular dental checkups. Good dental health is important because bacteria can spread from infected teeth and gums to the heart valves. When should you call for help? Call 911 anytime you think you may need emergency care.  For example, call if:    You have signs of acute aortic valve regurgitation such as:  Severe shortness of breath. A rapid heart rate. Lightheadedness.     You have symptoms of sudden heart failure such as:  Severe trouble breathing. Coughing up pink, foamy mucus. A new irregular or rapid heartbeat. Call your doctor now or seek immediate medical care if:    You have new symptoms or your symptoms get worse.     You have new or increased shortness of breath.     You are dizzy or lightheaded, or you feel like you may faint.     You have sudden weight gain, such as more than 2 to 3 pounds in a day or 5 pounds in a week. (Your doctor may suggest a different range of weight gain.)     You have new or increased swelling in your legs, ankles, or feet.     You are suddenly so tired or weak that you cannot do your usual activities. Watch closely for changes in your health, and be sure to contact your doctor if you have any problems. Where can you learn more? Go to [email or web address] org and sign in to your [employer] account. Enter W257 in the [facility] box to learn more about \"Aortic Valve Regurgitation: Care Instructions. \"     If you do not have an account, please click on the \"Sign Up Now\" link. Current as of: April 29, 2021               Content Version: 13.0  © 2006-2021 Healthwise, Incorporated. Care instructions adapted under license by [facility] ([facility]). If you have questions about a medical condition or this instruction, always ask your healthcare professional. [place] [address] any warranty or liability for your use of this information. Patient Education        Hearing Loss: Care Instructions  Overview     Hearing loss is a sudden or slow decrease in how well you hear. It can range from mild to severe. Permanent hearing loss can occur with aging. It also can happen when you are exposed long-term to loud noise.  Examples include listening to loud music, riding motorcycles, or being around other loud machines. Hearing loss can affect your work and home life. It can make you feel lonely or depressed. You may feel that you have lost your independence. But hearing aids and other devices can help you hear better and feel connected to others. Follow-up care is a key part of your treatment and safety. Be sure to make and go to all appointments, and call your doctor if you are having problems. It's also a good idea to know your test results and keep a list of the medicines you take. How can you care for yourself at home? Avoid loud noises whenever possible. This helps keep your hearing from getting worse. Always wear hearing protection around loud noises. Wear a hearing aid as directed. See a professional who can help you pick a hearing aid that fits you. Have hearing tests as your doctor suggests. They can show whether your hearing has changed. Your hearing aid may need to be adjusted. Use other devices as needed. These may include:  Telephone amplifiers and hearing aids that can connect to a television, stereo, radio, or microphone. Devices that use lights or vibrations. These alert you to the doorbell, a ringing telephone, or a baby monitor. Television closed-captioning. This shows the words at the bottom of the screen. Most new TVs can do this. TTY (text telephone). This lets you type messages back and forth on the telephone instead of talking or listening. These devices are also called TDD. When messages are typed on the keyboard, they are sent over the phone line to a receiving TTY. The message is shown on a monitor. Use text messaging, social media, and email if it is hard for you to communicate by telephone. Try to learn a listening technique called speechreading. It is not lipreading. You pay attention to people's gestures, expressions, posture, and tone of voice. These clues can help you understand what a person is saying.  Face the person you are talking control, passing out, and rib fractures from severe coughing. Tdap vaccine  Tdap is only for children 7 years and older, adolescents, and adults. Adolescents should receive a single dose of Tdap, preferably at age 6 or 15 years. Pregnant women should get a dose of Tdap during every pregnancy, to protect the  from pertussis. Infants are most at risk for severe, life threatening complications from pertussis. Adults who have never received Tdap should get a dose of Tdap. Also, adults should receive a booster dose every 10 years, or earlier in the case of a severe and dirty wound or burn. Booster doses can be either Tdap or Td (a different vaccine that protects against tetanus and diphtheria but not pertussis). Tdap may be given at the same time as other vaccines. Talk with your health care provider  Tell your vaccine provider if the person getting the vaccine:  Has had an allergic reaction after a previous dose of any vaccine that protects against tetanus, diphtheria, or pertussis, or has any severe, life threatening allergies. Has had a coma, decreased level of consciousness, or prolonged seizures within 7 days after a previous dose of any pertussis vaccine (DTP, DTaP, or Tdap). Has seizures or another nervous system problem. Has ever had Guillain-Barré Syndrome (also called GBS). Has had severe pain or swelling after a previous dose of any vaccine that protects against tetanus or diphtheria. In some cases, your health care provider may decide to postpone Tdap vaccination to a future visit. People with minor illnesses, such as a cold, may be vaccinated. People who are moderately or severely ill should usually wait until they recover before getting Tdap vaccine. Your health care provider can give you more information.   Risks of a vaccine reaction  Pain, redness, or swelling where the shot was given, mild fever, headache, feeling tired, and nausea, vomiting, diarrhea, or stomachache sometimes happen after Tdap vaccine. People sometimes faint after medical procedures, including vaccination. Tell your provider if you feel dizzy or have vision changes or ringing in the ears. As with any medicine, there is a very remote chance of a vaccine causing a severe allergic reaction, other serious injury, or death. What if there is a serious problem? An allergic reaction could occur after the vaccinated person leaves the clinic. If you see signs of a severe allergic reaction (hives, swelling of the face and throat, difficulty breathing, a fast heartbeat, dizziness, or weakness), call 9-1-1 and get the person to the nearest hospital.  For other signs that concern you, call your health care provider. Adverse reactions should be reported to the Vaccine Adverse Event Reporting System (VAERS). Your health care provider will usually file this report, or you can do it yourself. Visit the VAERS website at www.vaers. hhs.gov or call 8-473.320.8226. VAERS is only for reporting reactions, and VAERS staff do not give medical advice. The National Vaccine Injury Compensation Program  The National Vaccine Injury Compensation Program (VICP) is a federal program that was created to compensate people who may have been injured by certain vaccines. Visit the VICP website at www.hrsa.gov/vaccinecompensation or call 3-246.739.9937 to learn about the program and about filing a claim. There is a time limit to file a claim for compensation. How can I learn more? Ask your health care provider. Call your local or state health department. Contact the Centers for Disease Control and Prevention (CDC): Call 7-774.835.8398 (1-800-CDC-INFO) or  Visit CDC's website at www.cdc.gov/vaccines  Vaccine Information Statement (Interim)  Tdap (Tetanus, Diphtheria, Pertussis) Vaccine  04/01/2020  [address] RADHA Lyn [id number]  Department of Health and Human Services  Centers for Disease Control and Prevention  Many Vaccine Information Statements are available in Micronesian and other languages. See www.immunize.org/vis. Muchas hojas de información sobre vacunas están disponibles en español y en otros idiomas. Visite www.immunize.org/vis. Care instructions adapted under license by [facility] ([facility]). If you have questions about a medical condition or this instruction, always ask your healthcare professional. [place] [address] any warranty or liability for your use of this information. Patient Education        Well Visit, Over 72: Care Instructions  Overview     Well visits can help you stay healthy. Your doctor has checked your overall health and may have suggested ways to take good care of yourself. Your doctor also may have recommended tests. At home, you can help prevent illness with healthy eating, regular exercise, and other steps. Follow-up care is a key part of your treatment and safety. Be sure to make and go to all appointments, and call your doctor if you are having problems. It's also a good idea to know your test results and keep a list of the medicines you take. How can you care for yourself at home? Get screening tests that you and your doctor decide on. Screening helps find diseases before any symptoms appear. Eat healthy foods. Choose fruits, vegetables, whole grains, protein, and low-fat dairy foods. Limit fat, especially saturated fat. Reduce salt in your diet. Limit alcohol. If you are a man, have no more than 2 drinks a day or 14 drinks a week. If you are a woman, have no more than 1 drink a day or 7 drinks a week. Since alcohol affects older adults differently, you may want to limit alcohol even more. Or you may not want to drink at all. Get at least 30 minutes of exercise on most days of the week. Walking is a good choice. You also may want to do other activities, such as running, swimming, cycling, or playing tennis or team sports. Reach and stay at a healthy weight.  This will lower your risk for many problems, such as obesity, diabetes, heart disease, and high blood pressure. Do not smoke. Smoking can make health problems worse. If you need help quitting, talk to your doctor about stop-smoking programs and medicines. These can increase your chances of quitting for good. Care for your mental health. It is easy to get weighed down by worry and stress. Learn strategies to manage stress, like deep breathing and mindfulness, and stay connected with your family and community. If you find you often feel sad or hopeless, talk with your doctor. Treatment can help. Talk to your doctor about whether you have any risk factors for sexually transmitted infections (STIs). You can help prevent STIs if you wait to have sex with a new partner (or partners) until you've each been tested for STIs. It also helps if you use condoms (male or female condoms) and if you limit your sex partners to one person who only has sex with you. Vaccines are available for some STIs. If you think you may have a problem with alcohol or drug use, talk to your doctor. This includes prescription medicines (such as amphetamines and opioids) and illegal drugs (such as cocaine and methamphetamine). Your doctor can help you figure out what type of treatment is best for you. Protect your skin from too much sun. When you're outdoors from 10 a.m. to 4 p.m., stay in the shade or cover up with clothing and a hat with a wide brim. Wear sunglasses that block UV rays. Even when it's cloudy, put broad-spectrum sunscreen (SPF 30 or higher) on any exposed skin. See a dentist one or two times a year for checkups and to have your teeth cleaned. Wear a seat belt in the car. When should you call for help? Watch closely for changes in your health, and be sure to contact your doctor if you have any problems or symptoms that concern you. Where can you learn more? Go to https://chnikia.[employer]-part[facility]. org and sign in to your [employer] account.  Enter U829 in the [address] Information box to learn more about \"Well Visit, Over 65: Care Instructions. \"     If you do not have an account, please click on the \"Sign Up Now\" link. Current as of: February 11, 2021               Content Version: 13.0  © 0428-4200 Healthwise, Incorporated. Care instructions adapted under license by [facility] ([facility]). If you have questions about a medical condition or this instruction, always ask your healthcare professional. [address] any warranty or liability for your use of this information.

## 2021-12-29 ENCOUNTER — TELEPHONE (OUTPATIENT)
Dept: INTERNAL MEDICINE CLINIC | Age: 71
End: 2021-12-29

## 2021-12-29 DIAGNOSIS — Z11.59 SCREENING FOR VIRAL DISEASE: Primary | ICD-10-CM

## 2021-12-29 NOTE — TELEPHONE ENCOUNTER
Patient is requesting Covid Testing due to Exposure on Sunday. Some of the people he spent the Holiday with tested positive and he is wanting to test as a precaution.   Patient is scheduled for Monday, please place COVID order

## 2021-12-30 ENCOUNTER — TELEPHONE (OUTPATIENT)
Dept: INTERNAL MEDICINE CLINIC | Age: 71
End: 2021-12-30

## 2021-12-30 NOTE — TELEPHONE ENCOUNTER
Patient advised that since he has a known, close contact exposure and he is not feeling well, he should isolate until he gets tested

## 2021-12-30 NOTE — TELEPHONE ENCOUNTER
Patient has not been feeling well lately but his cough has gotten better but still very lethargic. He was exposed to his son who tested positive yesterday. Patient is on our schedule to be tested Monday here at [facility]. Should he quarantine until he finds out whether or not he is negative? Also how long should he quarantine since he has had exposure to his son? I offered to give him details on other testing facilities if he wanted to test before Monday but he declined. Please call to discuss latest protcol.

## 2022-01-03 ENCOUNTER — NURSE ONLY (OUTPATIENT)
Dept: INTERNAL MEDICINE CLINIC | Age: 72
End: 2022-01-03

## 2022-01-03 DIAGNOSIS — Z11.59 SCREENING FOR VIRAL DISEASE: ICD-10-CM

## 2022-01-04 LAB — SARS-COV-2: DETECTED

## 2022-03-07 RX ORDER — LISINOPRIL 40 MG/1
TABLET ORAL
Qty: 90 TABLET | Refills: 0 | Status: SHIPPED | OUTPATIENT
Start: 2022-03-07 | End: 2022-06-03

## 2022-03-07 RX ORDER — METOPROLOL SUCCINATE 100 MG/1
TABLET, EXTENDED RELEASE ORAL
Qty: 90 TABLET | Refills: 0 | Status: SHIPPED | OUTPATIENT
Start: 2022-03-07 | End: 2022-06-03

## 2022-03-07 NOTE — TELEPHONE ENCOUNTER
Last OV: 10/26/2021  Next OV: x  Last refill:  Most recent Labs: cmp, lipid, cbc 12/21/2021  Last EKG (if needed):10/26/2021

## 2022-05-18 ENCOUNTER — OFFICE VISIT (OUTPATIENT)
Dept: INTERNAL MEDICINE CLINIC | Age: 72
End: 2022-05-18
Payer: MEDICARE

## 2022-05-18 VITALS
SYSTOLIC BLOOD PRESSURE: 136 MMHG | BODY MASS INDEX: 26.28 KG/M2 | WEIGHT: 177.4 LBS | OXYGEN SATURATION: 98 % | DIASTOLIC BLOOD PRESSURE: 62 MMHG | RESPIRATION RATE: 14 BRPM | HEART RATE: 67 BPM | HEIGHT: 69 IN

## 2022-05-18 DIAGNOSIS — D68.00 VON WILLEBRAND DISEASE: ICD-10-CM

## 2022-05-18 DIAGNOSIS — K40.90 RIGHT INGUINAL HERNIA: Primary | ICD-10-CM

## 2022-05-18 PROCEDURE — 99213 OFFICE O/P EST LOW 20 MIN: CPT | Performed by: INTERNAL MEDICINE

## 2022-05-18 NOTE — PROGRESS NOTES
kg)   SpO2 98%   BMI 26.20 kg/m²   GEN: NAD, A&O, Non-toxic  GI: Abdomen is soft, NT, BS+, No hepatomegaly. No masses. Nondistended. : Large right inguinal hernia extending into the scrotum. ASSESSMENT/PLAN:    1. Right inguinal hernia  This is a new problem  Patient needs surgical referral  Assuming surgery is needed, he will need cleared by his hematologist.  He will likely need preoperative treatment with either DDAVP or Humate-EDWARD Perry MD, General Surgery, [facility]    2. Von Willebrand disease ([facility] [address]  Patient under the care of Dr. Mayra Luna. Discussed medications with patient who voiced understanding of their use, indication and potential side effects. Pt also understands the above recommendations. All questions answered. This note was generated completely or in part utilizing Dragon dictation speech recognition software. Occasionally, words are mistranscribed and despite editing, the text may contain inaccuracies due to incorrect word recognition.   If further clarification is needed please contact the office at [phone]       Electronically signed    Pallavi Gomez D.O.

## 2022-05-18 NOTE — PATIENT INSTRUCTIONS
Patient Education        Inguinal Hernia: Care Instructions  Your Care Instructions     An inguinal hernia occurs when tissue bulges through a weak spot in your groin area. You may see or feel a tender bulge in the groin or scrotum. You may alsohave pain, pressure or burning, or a feeling that something has \"given way. \"  Hernias are caused by a weakness in the belly wall. The bulge or discomfort may occur after heavy lifting, straining, or coughing. Hernias do not heal on theirown, and they tend to get worse over time. If your hernia does not bother you, you most likely can wait to have surgery. Your hernia may get worse, but it may not. In some cases, hernias that aresmall and painless may never need to be repaired. Follow-up care is a key part of your treatment and safety. Be sure to make and go to all appointments, and call your doctor if you are having problems. It's also a good idea to know your test results and keep alist of the medicines you take. How can you care for yourself at home?  Take pain medicines exactly as directed. ? If the doctor gave you a prescription medicine for pain, take it as prescribed. ? If you are not taking a prescription pain medicine, ask your doctor if you can take an over-the-counter medicine.  Use proper lifting techniques, and avoid heavy lifting if you can. To lift things more safely, bend your knees and let your arms and legs do the work. Keep your back straight, and do not bend over at the waist. Keep the load as close to your body as you can. Move your feet instead of turning or twisting your body.  Lose weight if you are overweight.  Include fruits, vegetables, legumes, and whole grains in your diet each day. These foods are high in fiber and will make it easier to avoid straining during bowel movements.  Do not smoke. Smoking can cause coughing, which can cause your hernia to bulge.  If you need help quitting, talk to your doctor about stop-smoking programs and medicines. These can increase your chances of quitting for good. When should you call for help? Call your doctor now or seek immediate medical care if:     You have new or worse belly pain.      You are vomiting.      You cannot pass stools or gas.      You cannot push the hernia back into place with gentle pressure when you are lying down.      The area over the hernia turns red or becomes tender. Watch closely for changes in your health, and be sure to contact your doctor ifyou have any problems. Where can you learn more? Go to https://[employer]. org and sign in to your [employer] account. Enter V269 in the [employer] box to learn more about \"Inguinal Hernia: Care Instructions. \"     If you do not have an account, please click on the \"Sign Up Now\" link. Current as of: September 8, 2021               Content Version: 13.2  © 0140-8506 Healthwise, Incorporated. Care instructions adapted under license by [facility] ([facility]). If you have questions about a medical condition or this instruction, always ask your healthcare professional. [address] any warranty or liability for your use of this information.

## 2022-05-25 ENCOUNTER — OFFICE VISIT (OUTPATIENT)
Dept: SURGERY | Age: 72
End: 2022-05-25
Payer: MEDICARE

## 2022-05-25 VITALS — DIASTOLIC BLOOD PRESSURE: 78 MMHG | WEIGHT: 177 LBS | SYSTOLIC BLOOD PRESSURE: 180 MMHG | BODY MASS INDEX: 26.14 KG/M2

## 2022-05-25 DIAGNOSIS — D68.00 VON WILLEBRAND DISEASE: ICD-10-CM

## 2022-05-25 DIAGNOSIS — K40.90 RIGHT INGUINAL HERNIA: Primary | ICD-10-CM

## 2022-05-25 PROCEDURE — 99204 OFFICE O/P NEW MOD 45 MIN: CPT | Performed by: SURGERY

## 2022-05-25 ASSESSMENT — ENCOUNTER SYMPTOMS: GASTROINTESTINAL NEGATIVE: 1

## 2022-05-25 NOTE — LETTER
CONSENT TO OPERATION      Right inguinal hernia with mesh      PATIENT : Hector Getting OF BIRTH:  1950             DATE : 5/25/22     1. I request and consent that Dr. Lawson Burleson his associates or assistants perform an operation and/or procedures on the above patient at [facility], to treat the condition(s) which appear indicated by the diagnostic studies already performed. 2. It has been explained to me by the informing physician that during the course of the operation and/or procedure(s) unforeseen conditions may be revealed that necessitate an extension of the original operation and/or procedure(s) or different operation and/or procedures than those set forth in Paragraph 1. I therefore authorize and request that my physician and/or his associates or assistants perform such operations and/or procedures as are necessary and desirable in the exercise of professional judgment. The authority granted under this Paragraph 2 shall extend to all conditions that require treatment and are known to my physician at the time the operation is commenced. 3. I have been made aware by the informing physician of certain risks and consequences that are associated with the operation and/or procedure(s) described in Paragraph 1, the reasonable alternative methods or treatment, the possible consequences, the possibility that the operation and/or procedure(s) may be unsuccessful and the possibility of complications. I understand the reasonably known risks to be:  - Bleeding  - Infection  - Poor Healing  - Possible Damage to Nerve, Vessel, Tendon/Muscle or Bone  - Need for further Treatment/Surgery  - Stiffness  - Pain  - Residual or Recurrent Symptoms  - Anesthetic and/or Medical Risks     4. I have also been informed by the informing physician that there are other risks from both known and unknown causes that are attendant to the performance of any surgical procedure.   I am aware that the practice of medicine and surgery is not an exact science, and that no guarantees have been made to me concerning the results of the operation and/or procedure(s). 5. I consent to the administration of anesthesia and to the use of such anesthetics as may be deemed advisable by the anesthesiologist who has been engaged by me or my physician. 6. I certify that I have read and understand the above consent to operation and/or other procedure(s); that the explanations therein referred to were made to me by the informing physician in advance of my signing this consent; that all blanks or statements requiring insertion or completion were filled in and inapplicable paragraphs, if any, were stricken before I signed; and that all questions asked by me about the operation and/or procedure(s) which I have consented to have been fully answered in a satisfactory manner.            5/25/22                      _______________________  Signature Of Patient   Date              Witness          COVID-19 Date: ___________           OR Date:  ___________  [id number] Time: ___________

## 2022-05-25 NOTE — LETTER
Surgery Scheduling Form:    DEMOGRAPHICS:                                                                                                         .    Patient Name:  Jesus Gramajo  Patient :  1950   Patient SS#:      Patient Phone:  [phone] (home)  Alt. Patient Phone:                     Patient Address:  Nicola Arredondo [address] [place] [address],Counts include 234 beds at the [facility] Floor [address]    PCP:  Tushar Goel DO  Insurance:  Payor: Sophie Driver / Plan: Nasim[employer] ESSENTIAL/PLUS / Product Type: *No Product type* / Insurance ID Number:    Payor/Plan Subscr  Sex Relation Sub. Ins. ID Effective Group Num   1. 100 Dee [place]* 1950 Male Self [id number] [id number]                                    [id number]       DIAGNOSIS & PROCEDURE:                                                                                       .    Diagnosis:  K40.90 Right inguinal hernia  Operation:  Right Inguinal Hernia Repair with Mesh  Location: [facility]   Surgeon:  Cherri Vernon. Unique Davis MD          SCHEDULING INFORMATION:                                                                                    .    Surgeon's Scheduling Instruction:  elective  Requested Date: 6/10/22   OR Time:           Patient Arrival Time:   OR Time Required:  45  Minutes  Anesthesia:  MAC/TIVA  Equipment:                                                            SA Required:  Yes     Status:  Outpatient             Standard C-Arm:  No  PAT Required: Yes                              Best Time to Call:  AM  Patient Requested to see PCP for Pre-op H & P:  No  Special Comments:                       Cherri Vernon.  Unique Davis MD 10:42a         PRE-CERTIFICATION INFORMATION:                                                                           .    Procedure:       CPT Code Modifier          68424

## 2022-05-25 NOTE — PROGRESS NOTES
Subjective:      Patient ID: Ita Villagran is a 70 y.o. male. HPI  Chief Complaint: hernia  Patient referred by Dr. April Thompson for evaluation of hernia. Patient reports symptoms of bulge, pain. Location of symptoms is right groin. Denies obstructive symptoms. Symptoms were first noted 2 months ago. Alleviated by lying down, reducing contents. Symptoms aggravated by movements, prolonged activity. Previous evaluation includes exam by PCP. Patient has a history of HTN, HLP, von Willebrand disease. Follows with Dr. María Elena Franco and usually received DDAVP with mild procedures. Will plan following treatment: right inguinal hernia repair.         Past Medical History:   Diagnosis Date    Acute ischemic stroke ([facility] 10/24/2014    Right    Anemia     Arthritis     rt knee    CAD (coronary artery disease) 05/29/2014    LAD, RCA, Diag - cariothoracic surgery    Cataracts, bilateral 07/2020    Colon polyps 02/03/2020    Tubular adenoma, Dr. Yenny INGRAM (hard of hearing)     hermes-lateral hearing aids    HTN (hypertension)     Hyperlipidemia     Hypokalemia     Jaw dislocation     Von Willebrand disease ([facility]     Walking pneumonia        Past Surgical History:   Procedure Laterality Date    COLONOSCOPY  02/03/2020    Dr. Sushma Ortiz N/A 2/3/2020    COLONOSCOPY POLYPECTOMY SNARE/COLD BIOPSY performed by Albina Aguilar MD at [address] [place] [address]      2015    INTRACAPSULAR CATARACT EXTRACTION Right 7/31/2020    Phacoemulsification with intraocular lens implant performed by Deisy Melvin MD at [place] [address] Left 8/6/2020    Phacoemulsification with intraocular lens implant performed by Deisy Melvin MD at [address] [place] [address] Right 10/21/14    right total knee replacement   [facility]       Current Outpatient Medications   Medication Sig Dispense Refill    metoprolol succinate (TOPROL XL) 100 MG extended release tablet TAKE 1 TABLET BY MOUTH EVERY DAY 90 tablet 0    lisinopril (PRINIVIL;ZESTRIL) 40 MG tablet TAKE 1 TABLET BY MOUTH EVERY DAY 90 tablet 0    atorvastatin (LIPITOR) 80 MG tablet TAKE 1 TABLET BY MOUTH ONE TIME A DAY 90 tablet 3    amLODIPine (NORVASC) 10 MG tablet TAKE 1 TABLET BY MOUTH EVERY DAY 90 tablet 3    aspirin 81 MG tablet Take 81 mg by mouth daily. No current facility-administered medications for this visit. Prior to Admission medications    Medication Sig Start Date End Date Taking? Authorizing Provider   metoprolol succinate (TOPROL XL) 100 MG extended release tablet TAKE 1 TABLET BY MOUTH EVERY DAY 3/7/22  Yes Marcia Saldana MD   lisinopril (PRINIVIL;ZESTRIL) 40 MG tablet TAKE 1 TABLET BY MOUTH EVERY DAY 3/7/22  Yes Marcia Saldana MD   atorvastatin (LIPITOR) 80 MG tablet TAKE 1 TABLET BY MOUTH ONE TIME A DAY 3/15/21  Yes Albert Lopez,    amLODIPine (NORVASC) 10 MG tablet TAKE 1 TABLET BY MOUTH EVERY DAY 3/15/21  Yes Albert Lopez,    aspirin 81 MG tablet Take 81 mg by mouth daily. Yes Historical Provider, MD         No Known Allergies    Social History     Socioeconomic History    Marital status:      Spouse name: Not on file    Number of children: Not on file    Years of education: Not on file    Highest education level: Not on file   Occupational History    Not on file   Tobacco Use    Smoking status: Never Smoker    Smokeless tobacco: Never Used   Vaping Use    Vaping Use: Never used   Substance and Sexual Activity    Alcohol use:  Yes     Alcohol/week: 3.0 standard drinks     Types: 3 Cans of beer per week     Comment: rare    Drug use: No    Sexual activity: Not Currently     Partners: Female     Comment:    Other Topics Concern    Not on file   Social History Narrative    Not on file     Social Determinants of Health     Financial Resource Strain:     Difficulty of Paying Living Expenses: Not on file   Food Insecurity:     Worried About [employer] in the Last Year: Not on file    S[place] of Food in the Last Year: Not on file   Transportation Needs:     Lack of Transportation (Medical): Not on file    Lack of Transportation (Non-Medical): Not on file   Physical Activity:     Days of Exercise per Week: Not on file    Minutes of Exercise per Session: Not on file   Stress:     Feeling of Stress : Not on file   Social Connections:     Frequency of Communication with Friends and Family: Not on file    Frequency of Social Gatherings with Friends and Family: Not on file    Attends Confucianist Services: Not on file    Active Member of [address] [place] [address] or Organizations: Not on file    Attends Club or Organization Meetings: Not on file    Marital Status: Not on file   Intimate Partner Violence:     Fear of Current or Ex-Partner: Not on file    Emotionally Abused: Not on file    Physically Abused: Not on file    Sexually Abused: Not on file   Housing Stability:     Unable to Pay for Housing in the Last Year: Not on file    Number of [place] in the Last Year: Not on file    Unstable Housing in the Last Year: Not on file       Family History   Problem Relation Age of Onset    Other Mother 35        blood clot    High Blood Pressure Father     Heart Disease Father 54        CAD, CABG    Clotting Disorder Sister         Von Willebrand's       Review of Systems   Gastrointestinal: Negative. All other systems reviewed and are negative. Objective:   Physical Exam  Vitals reviewed. Constitutional:       General: He is not in acute distress. Appearance: He is well-developed. He is not diaphoretic. HENT:      Head: Normocephalic and atraumatic. Right Ear: External ear normal.      Left Ear: External ear normal.   Eyes:      Conjunctiva/sclera: Conjunctivae normal.      Pupils: Pupils are equal, round, and reactive to light.    Neck:      Trachea: Trachea normal.   Cardiovascular:      Rate and Rhythm: Normal rate and regular rhythm. Heart sounds: Normal heart sounds, S1 normal and S2 normal.   Pulmonary:      Effort: Pulmonary effort is normal. No respiratory distress. Breath sounds: Normal breath sounds. No wheezing. Abdominal:      General: There is no distension. Palpations: Abdomen is soft. Hernia: A hernia (reducible right inguinal) is present. Musculoskeletal:         General: Normal range of motion. Cervical back: Normal range of motion and neck supple. Skin:     General: Skin is warm and dry. Findings: No erythema. Neurological:      Mental Status: He is alert and oriented to person, place, and time. Psychiatric:         Behavior: Behavior normal. Behavior is cooperative. Thought Content: Thought content normal.         Judgment: Judgment normal.         Assessment:       Diagnosis Orders   1. Right inguinal hernia     2. [facility]             Plan:      Plan right inguinal hernia repair  The risks, benefits and alternatives to the planned procedure were discussed. Patient expressed an understanding and is willing to proceed.   D/W Dr. Anna Dates  - plan DDAVP 0.3 mcg/kg infusion in preop        Corinne Penning, MD

## 2022-06-01 ENCOUNTER — TELEPHONE (OUTPATIENT)
Dept: SURGERY | Age: 72
End: 2022-06-01

## 2022-06-01 NOTE — LETTER
[address] Vascular Surgery  [address]  [facility] [address]  Phone: [phone]  Fax: [phone]    June 1, 2022    Carol Milton 50  [address] [facility] [address]    Dear Jennifer Julien: The following arrangements have been completed for your procedure:  PLEASE FOLLOW THE BELOW DIRECTIONS CAREFULLY:                               Hospital: [facility] [address] [facility] [address]                             Date of Surgery:  06-                             Time of Surgery:  7:30 AM                                Arrival Time at Hospital:  6:00 AM    1. You are to have NOTHING TO EAT OR DRINK after midnight the night before your procedure. You may take any Heart or Blood Pressure medications with a sip of water to wash them down. Please refrain from any Alcohol or Caffeine or caffeine products for 24 hours prior to surgery. Hold your 81mg Aspirin on the morning of surgery. Suggested to wait on medications until after you return home following surgery. 2.  You will need someone to drive you home following your procedure. 3.  If you develop a fever, please notify our office as soon as possible. Please keep in mind that emergencies requiring immediate intervention do occur, which creates delays. The staff will do everything possible to ensure your procedure remains on time. Please remember to bring a Photo ID & Insurance Card with you. Please  report in at the Surgery Desk down the right-hand hallway on the first floor. If you have any questions or concerns, please don't hesitate to call.     Sincerely,      Sil Fang - Patient Services Specialist for:  Maria Esther Matos MD

## 2022-06-01 NOTE — TELEPHONE ENCOUNTER
Spoke with patient regarding scheduled surgery on 06- with Dr. Tamika Barillas. Patient is asked to arrive by 6:00 AM @ [address] after midnight. Patient states he normally takes all his medication in the afternoon. Suggested he wait to take his medication once he returns home from surgery. You will need someone to drive you home following this procedure (His Sister will be here). Please bring a Photo ID & Insurance card with you, and check-in at the Surgery Desk down the right-hand hallway on the first floor. Surgery is scheduled to start at approx. 7:30 AM and should take approx. 60 minutes. If the hospital needs any further information, someone will give you a call. Patient expressed a verbal understanding of these instructions and had no further questions at this time. Mailed instruction letter. Call ended.

## 2022-06-02 NOTE — PROGRESS NOTES
toes      For your safety, please do not wear any jewelry or body piercing's on the day of surgery. All jewelry must be removed. If you have dentures, they will be removed before going to operating room. For your convenience, we will provide you with a container. If you wear contact lenses or glasses, they will be removed, please bring a case for them. If you have a living will and a durable power of  for healthcare, please bring in a copy. As part of our patient safety program to minimize surgical site infections, we ask you to do the following:    · Please notify your surgeon if you develop any illness between         now and the  day of your surgery. · This includes a cough, cold, fever, sore throat, nausea,         or vomiting, and diarrhea, etc.  ·  Please notify your surgeon if you experience dizziness, shortness         of breath or blurred vision between now and the time of your surgery. Do not shave your operative site 96 hours prior to surgery. For face and neck surgery, men may use an electric razor 48 hours   prior to surgery. You may shower the night before surgery or the morning of   your surgery with an antibacterial soap. You will need to bring a photo ID and insurance card    [facility] has an onsite pharmacy, would you like to utilize our pharmacy     If you will be staying overnight and use a C-pap machine, please bring   your C-pap to hospital     Our goal is to provide you with excellent care, therefore, visitors will be limited to two(2) in the room at a time so that we may focus on providing this care for you. Please contact pre-admission testing if you have any further questions. [facility] phone number:  [phone] PAT fax number:  [phone]  Please note these are generalized instructions for all surgical cases, you may be provided with more specific instructions according to your surgery.     C-Difficile admission screening and protocol:       * Admitted with diarrhea? [] YES    [x]  NO     *Prior history of C-Diff. In last 3 months? [] YES    [x]  NO     *Antibiotic use in the past 6-8 weeks? [x]  NO    []  YES                 If yes, which ANTIBIOTIC AND REASON______     *Prior hospitalization or nursing home in the last month? []  YES    [x]  NO        SAFETY FIRST. .call before you fall

## 2022-06-03 DIAGNOSIS — I10 BENIGN ESSENTIAL HTN: ICD-10-CM

## 2022-06-03 DIAGNOSIS — E78.5 HYPERLIPIDEMIA, UNSPECIFIED HYPERLIPIDEMIA TYPE: ICD-10-CM

## 2022-06-03 RX ORDER — AMLODIPINE BESYLATE 10 MG/1
TABLET ORAL
Qty: 90 TABLET | Refills: 0 | Status: SHIPPED | OUTPATIENT
Start: 2022-06-03 | End: 2022-08-31

## 2022-06-03 RX ORDER — ATORVASTATIN CALCIUM 80 MG/1
TABLET, FILM COATED ORAL
Qty: 90 TABLET | Refills: 0 | Status: SHIPPED | OUTPATIENT
Start: 2022-06-03 | End: 2022-08-31

## 2022-06-03 RX ORDER — METOPROLOL SUCCINATE 100 MG/1
TABLET, EXTENDED RELEASE ORAL
Qty: 90 TABLET | Refills: 2 | Status: SHIPPED | OUTPATIENT
Start: 2022-06-03

## 2022-06-03 RX ORDER — LISINOPRIL 40 MG/1
TABLET ORAL
Qty: 90 TABLET | Refills: 0 | Status: SHIPPED | OUTPATIENT
Start: 2022-06-03 | End: 2022-09-07

## 2022-06-09 ENCOUNTER — ANESTHESIA EVENT (OUTPATIENT)
Dept: OPERATING ROOM | Age: 72
End: 2022-06-09
Payer: MEDICARE

## 2022-06-10 ENCOUNTER — ANESTHESIA (OUTPATIENT)
Dept: OPERATING ROOM | Age: 72
End: 2022-06-10
Payer: MEDICARE

## 2022-06-10 ENCOUNTER — HOSPITAL ENCOUNTER (OUTPATIENT)
Age: 72
Setting detail: OUTPATIENT SURGERY
Discharge: HOME | End: 2022-06-10
Attending: SURGERY | Admitting: SURGERY
Payer: MEDICARE

## 2022-06-10 VITALS
WEIGHT: 172 LBS | TEMPERATURE: 97.2 F | DIASTOLIC BLOOD PRESSURE: 51 MMHG | HEART RATE: 54 BPM | SYSTOLIC BLOOD PRESSURE: 138 MMHG | OXYGEN SATURATION: 100 % | BODY MASS INDEX: 25.48 KG/M2 | RESPIRATION RATE: 16 BRPM | HEIGHT: 69 IN

## 2022-06-10 DIAGNOSIS — K40.90 RIGHT INGUINAL HERNIA: ICD-10-CM

## 2022-06-10 PROCEDURE — 3600000003 HC SURGERY LEVEL 3 BASE: Performed by: SURGERY

## 2022-06-10 PROCEDURE — 6360000002 HC RX W HCPCS: Performed by: NURSE ANESTHETIST, CERTIFIED REGISTERED

## 2022-06-10 PROCEDURE — A4217 STERILE WATER/SALINE, 500 ML: HCPCS | Performed by: SURGERY

## 2022-06-10 PROCEDURE — 3700000000 HC ANESTHESIA ATTENDED CARE: Performed by: SURGERY

## 2022-06-10 PROCEDURE — 2580000003 HC RX 258: Performed by: SURGERY

## 2022-06-10 PROCEDURE — 7100000010 HC PHASE II RECOVERY - FIRST 15 MIN: Performed by: SURGERY

## 2022-06-10 PROCEDURE — 6360000002 HC RX W HCPCS: Performed by: SURGERY

## 2022-06-10 PROCEDURE — 88302 TISSUE EXAM BY PATHOLOGIST: CPT

## 2022-06-10 PROCEDURE — 7100000011 HC PHASE II RECOVERY - ADDTL 15 MIN: Performed by: SURGERY

## 2022-06-10 PROCEDURE — 49505 PRP I/HERN INIT REDUC >5 YR: CPT | Performed by: SURGERY

## 2022-06-10 PROCEDURE — 7100000001 HC PACU RECOVERY - ADDTL 15 MIN: Performed by: SURGERY

## 2022-06-10 PROCEDURE — 2709999900 HC NON-CHARGEABLE SUPPLY: Performed by: SURGERY

## 2022-06-10 PROCEDURE — 7100000000 HC PACU RECOVERY - FIRST 15 MIN: Performed by: SURGERY

## 2022-06-10 PROCEDURE — C1781 MESH (IMPLANTABLE): HCPCS | Performed by: SURGERY

## 2022-06-10 PROCEDURE — 2500000003 HC RX 250 WO HCPCS: Performed by: SURGERY

## 2022-06-10 PROCEDURE — 3600000013 HC SURGERY LEVEL 3 ADDTL 15MIN: Performed by: SURGERY

## 2022-06-10 PROCEDURE — 2580000003 HC RX 258: Performed by: ANESTHESIOLOGY

## 2022-06-10 PROCEDURE — 3700000001 HC ADD 15 MINUTES (ANESTHESIA): Performed by: SURGERY

## 2022-06-10 DEVICE — MESH HERN W3XL6IN INGUINAL POLYPR MFIL RECTANG: Type: IMPLANTABLE DEVICE | Site: GROIN | Status: FUNCTIONAL

## 2022-06-10 RX ORDER — ONDANSETRON 2 MG/ML
4 INJECTION INTRAMUSCULAR; INTRAVENOUS
Status: DISCONTINUED | OUTPATIENT
Start: 2022-06-10 | End: 2022-06-10 | Stop reason: HOSPADM

## 2022-06-10 RX ORDER — FENTANYL CITRATE 50 UG/ML
INJECTION, SOLUTION INTRAMUSCULAR; INTRAVENOUS PRN
Status: DISCONTINUED | OUTPATIENT
Start: 2022-06-10 | End: 2022-06-10 | Stop reason: SDUPTHER

## 2022-06-10 RX ORDER — SODIUM CHLORIDE 0.9 % (FLUSH) 0.9 %
5-40 SYRINGE (ML) INJECTION PRN
Status: DISCONTINUED | OUTPATIENT
Start: 2022-06-10 | End: 2022-06-10 | Stop reason: HOSPADM

## 2022-06-10 RX ORDER — MEPERIDINE HYDROCHLORIDE 25 MG/ML
12.5 INJECTION INTRAMUSCULAR; INTRAVENOUS; SUBCUTANEOUS EVERY 5 MIN PRN
Status: DISCONTINUED | OUTPATIENT
Start: 2022-06-10 | End: 2022-06-10 | Stop reason: HOSPADM

## 2022-06-10 RX ORDER — SODIUM CHLORIDE 0.9 % (FLUSH) 0.9 %
5-40 SYRINGE (ML) INJECTION EVERY 12 HOURS SCHEDULED
Status: DISCONTINUED | OUTPATIENT
Start: 2022-06-10 | End: 2022-06-10 | Stop reason: HOSPADM

## 2022-06-10 RX ORDER — OXYCODONE HYDROCHLORIDE 5 MG/1
5 TABLET ORAL PRN
Status: DISCONTINUED | OUTPATIENT
Start: 2022-06-10 | End: 2022-06-10 | Stop reason: HOSPADM

## 2022-06-10 RX ORDER — OXYCODONE HYDROCHLORIDE 5 MG/1
5 TABLET ORAL EVERY 6 HOURS PRN
Qty: 20 TABLET | Refills: 0 | Status: SHIPPED | OUTPATIENT
Start: 2022-06-10 | End: 2022-06-15

## 2022-06-10 RX ORDER — MAGNESIUM HYDROXIDE 1200 MG/15ML
LIQUID ORAL CONTINUOUS PRN
Status: COMPLETED | OUTPATIENT
Start: 2022-06-10 | End: 2022-06-10

## 2022-06-10 RX ORDER — OXYCODONE HYDROCHLORIDE 10 MG/1
10 TABLET ORAL PRN
Status: DISCONTINUED | OUTPATIENT
Start: 2022-06-10 | End: 2022-06-10 | Stop reason: HOSPADM

## 2022-06-10 RX ORDER — SODIUM CHLORIDE 9 MG/ML
INJECTION, SOLUTION INTRAVENOUS PRN
Status: DISCONTINUED | OUTPATIENT
Start: 2022-06-10 | End: 2022-06-10 | Stop reason: HOSPADM

## 2022-06-10 RX ORDER — FENTANYL CITRATE 50 UG/ML
25 INJECTION, SOLUTION INTRAMUSCULAR; INTRAVENOUS EVERY 5 MIN PRN
Status: DISCONTINUED | OUTPATIENT
Start: 2022-06-10 | End: 2022-06-10 | Stop reason: HOSPADM

## 2022-06-10 RX ORDER — MIDAZOLAM HYDROCHLORIDE 1 MG/ML
INJECTION INTRAMUSCULAR; INTRAVENOUS PRN
Status: DISCONTINUED | OUTPATIENT
Start: 2022-06-10 | End: 2022-06-10 | Stop reason: SDUPTHER

## 2022-06-10 RX ORDER — PROPOFOL 10 MG/ML
INJECTION, EMULSION INTRAVENOUS PRN
Status: DISCONTINUED | OUTPATIENT
Start: 2022-06-10 | End: 2022-06-10 | Stop reason: SDUPTHER

## 2022-06-10 RX ORDER — LIDOCAINE HYDROCHLORIDE AND EPINEPHRINE 10; 10 MG/ML; UG/ML
INJECTION, SOLUTION INFILTRATION; PERINEURAL
Status: COMPLETED | OUTPATIENT
Start: 2022-06-10 | End: 2022-06-10

## 2022-06-10 RX ORDER — DIPHENHYDRAMINE HYDROCHLORIDE 50 MG/ML
12.5 INJECTION INTRAMUSCULAR; INTRAVENOUS
Status: DISCONTINUED | OUTPATIENT
Start: 2022-06-10 | End: 2022-06-10 | Stop reason: HOSPADM

## 2022-06-10 RX ORDER — BUPIVACAINE HYDROCHLORIDE 5 MG/ML
INJECTION, SOLUTION EPIDURAL; INTRACAUDAL
Status: COMPLETED | OUTPATIENT
Start: 2022-06-10 | End: 2022-06-10

## 2022-06-10 RX ORDER — PROPOFOL 10 MG/ML
INJECTION, EMULSION INTRAVENOUS CONTINUOUS PRN
Status: DISCONTINUED | OUTPATIENT
Start: 2022-06-10 | End: 2022-06-10 | Stop reason: SDUPTHER

## 2022-06-10 RX ORDER — LABETALOL HYDROCHLORIDE 5 MG/ML
5 INJECTION, SOLUTION INTRAVENOUS
Status: DISCONTINUED | OUTPATIENT
Start: 2022-06-10 | End: 2022-06-10 | Stop reason: HOSPADM

## 2022-06-10 RX ADMIN — PROPOFOL 140 MCG/KG/MIN: 10 INJECTION, EMULSION INTRAVENOUS at 07:30

## 2022-06-10 RX ADMIN — MIDAZOLAM 2 MG: 1 INJECTION INTRAMUSCULAR; INTRAVENOUS at 07:27

## 2022-06-10 RX ADMIN — PROPOFOL 60 MG: 10 INJECTION, EMULSION INTRAVENOUS at 07:30

## 2022-06-10 RX ADMIN — CEFAZOLIN 2000 MG: 10 INJECTION, POWDER, FOR SOLUTION INTRAVENOUS at 07:27

## 2022-06-10 RX ADMIN — DESMOPRESSIN ACETATE 20 MCG: 4 INJECTION, SOLUTION INTRAVENOUS; SUBCUTANEOUS at 07:07

## 2022-06-10 RX ADMIN — FENTANYL CITRATE 100 MCG: 50 INJECTION INTRAMUSCULAR; INTRAVENOUS at 07:30

## 2022-06-10 RX ADMIN — SODIUM CHLORIDE: 9 INJECTION, SOLUTION INTRAVENOUS at 07:27

## 2022-06-10 ASSESSMENT — PAIN SCALES - GENERAL
PAINLEVEL_OUTOF10: 0

## 2022-06-10 NOTE — PROGRESS NOTES
Mother voiced multiple concerns about her son being discharged, harm to himself, and his medications. Pt alert. Pain 2/10 in left wrist and tolerable. Tolerates PO. VSS. Waiting to hear from Aristides Hawley and Mitchell HURLEY about resources for pt and discharge.

## 2022-06-10 NOTE — PROGRESS NOTES
Pt arrived to PACU from [address] on stretcher. Attached to pacu monitor. Asleep, Oral airway in place.

## 2022-06-10 NOTE — ANESTHESIA POSTPROCEDURE EVALUATION
Department of Anesthesiology  Postprocedure Note    Patient: Jose Maria Linares  MRN: [id number]  YOB: 1950  Date of evaluation: 6/10/2022  Time:  9:38 AM     Procedure Summary     Date: 06/10/22 Room / Location: [address]    Anesthesia Start: 7904 Anesthesia Stop: 0813    Procedure: RIGHT INGUINAL HERNIA REPAIR WITH MESH (Right Groin) Diagnosis:       Right inguinal hernia      (RIGHT INGUINAL HERNIA)    Surgeons: Praveena Hess MD Responsible Provider: Abdullahi Villalobos MD    Anesthesia Type: MAC ASA Status: 3          Anesthesia Type: No value filed. Arthur Phase I: Arthur Score: 10    Arthur Phase II: Arthur Score: 9    Last vitals: Reviewed and per EMR flowsheets.        Anesthesia Post Evaluation    Patient location during evaluation: PACU  Patient participation: complete - patient participated  Level of consciousness: awake  Airway patency: patent  Nausea & Vomiting: no nausea  Complications: no  Cardiovascular status: hemodynamically stable  Respiratory status: acceptable  Hydration status: euvolemic  Multimodal analgesia pain management approach

## 2022-06-10 NOTE — PROGRESS NOTES
Pt alert. denies pain. Refuses PO at present but denies nausea.  Pt states \"The way I feel right now I would like to take my sisters out to breakfast.\" Placed call to Meghanderek Sloan (sister) to come to the hospital.

## 2022-06-10 NOTE — H&P
Update History & Physical    The patient's History and Physical of May 25, 2022 was reviewed with the patient and I examined the patient. There was no change. Plan right inguinal hernia repair, right groin marked. The surgical site was confirmed by the patient and me. Plan: The risks, benefits, expected outcome, and alternative to the recommended procedure have been discussed with the patient. Patient understands and wants to proceed with the procedure.      Electronically signed by Sumanth Horn MD on 6/10/2022 at 7:18 AM

## 2022-06-10 NOTE — ANESTHESIA PRE PROCEDURE
Department of Anesthesiology  Preprocedure Note       Name:  Jesus Gramajo   Age:  70 y.o.  :  1950                                          MRN:  [id number]         Date:  6/10/2022      Surgeon: Sumit Alexis):  Gordy Shepherd MD    Procedure: Procedure(s):  RIGHT INGUINAL HERNIA REPAIR WITH MESH    Medications prior to admission:   Prior to Admission medications    Medication Sig Start Date End Date Taking? Authorizing Provider   amLODIPine (NORVASC) 10 MG tablet TAKE 1 TABLET BY MOUTH EVERY DAY 6/3/22   Albert Lopez DO   atorvastatin (LIPITOR) 80 MG tablet TAKE 1 TABLET BY MOUTH EVERY DAY 6/3/22   Albert Lopez DO   metoprolol succinate (TOPROL XL) 100 MG extended release tablet TAKE 1 TABLET BY MOUTH EVERY DAY 6/3/22   PEDRO Hensley CNP   lisinopril (PRINIVIL;ZESTRIL) 40 MG tablet TAKE 1 TABLET BY MOUTH EVERY DAY 6/3/22   PEDRO Hensley CNP   aspirin 81 MG tablet Take 81 mg by mouth daily.     Historical Provider, MD       Current medications:    Current Facility-Administered Medications   Medication Dose Route Frequency Provider Last Rate Last Admin    sodium chloride flush 0.9 % injection 5-40 mL  5-40 mL IntraVENous 2 times per day Yancy Richter MD        sodium chloride flush 0.9 % injection 5-40 mL  5-40 mL IntraVENous PRN Yancy Richter MD        0.9 % sodium chloride infusion   IntraVENous PRN Yancy Richter MD        ceFAZolin (ANCEF) 2000 mg in dextrose 5 % 100 mL IVPB  2,000 mg IntraVENous Once Gordy Shepherd MD        desmopressin (DDAVP) 20 mcg in sodium chloride 0.9 % 50 mL IVPB  20 mcg IntraVENous Once Gordy Shepherd MD           Allergies:  No Known Allergies    Problem List:    Patient Active Problem List   Diagnosis Code    Hyperlipidemia E78.5    CAD, multiple vessel I25.10    Von Willebrand disease ([facility] D68.0    S/P CABG (coronary artery bypass graft) Z95.1    AI (aortic insufficiency) I35.1    Essential hypertension I10    Right knee DJD M17.11  Primary localized osteoarthrosis, lower leg M17.10    Acute ischemic stroke (HCC) I63.9    Left-sided weakness R53.1    Slurred speech R47.81    Facial droop due to stroke [id number]    Anemia D64.9    Paronychia of great toe, left L03.032    Sensorineural hearing loss (SNHL) of both ears H90.3    Coronary artery disease involving native coronary artery of native heart without angina pectoris I25.10    Current moderate episode of major depressive disorder without prior episode ([facility] F32.1    Right inguinal hernia K40.90       Past Medical History:        Diagnosis Date    Acute ischemic stroke ([facility] 10/24/2014    Right    Anemia     Arthritis     rt knee    CAD (coronary artery disease) 05/29/2014    LAD, RCA, Diag - cariothoracic surgery    Cataracts, bilateral 07/2020    Colon polyps 02/03/2020    Tubular adenoma, Dr. Baez Pablo (hard of hearing)     hermes-lateral hearing aids    HTN (hypertension)     Hyperlipidemia     Hypokalemia     Jaw dislocation     Von Willebrand disease ([facility]     Walking pneumonia        Past Surgical History:        Procedure Laterality Date    COLONOSCOPY  02/03/2020    Dr. Mitra Wheatlye COLONOSCOPY N/A 2/3/2020    COLONOSCOPY POLYPECTOMY SNARE/COLD BIOPSY performed by Gail Paris MD at [facility]      2015    INTRACAPSULAR CATARACT EXTRACTION Right 7/31/2020    Phacoemulsification with intraocular lens implant performed by Kenya Francisco MD at [place] [address] Left 8/6/2020    Phacoemulsification with intraocular lens implant performed by Kenya Francisco MD at [address] [place] [address] Right 10/21/14    right total knee replacement    TONSILLECTOMY AND ADENOIDECTOMY  1955       Social History:    Social History     Tobacco Use    Smoking status: Never Smoker    Smokeless tobacco: Never Used   Substance Use Topics    Alcohol use:  Yes Alcohol/week: 3.0 standard drinks     Types: 3 Cans of beer per week     Comment: rare                                Counseling given: Not Answered      Vital Signs (Current):   Vitals:    06/02/22 1347 06/10/22 0615 06/10/22 0630   BP:   (!) 170/59   Pulse:   66   Resp:   18   Temp:   97.9 °F (36.6 °C)   TempSrc:   Temporal   SpO2:   97%   Weight: 174 lb (78.9 kg) 172 lb (78 kg)    Height: 5' 9\" (1.753 m) 5' 9\" (1.753 m)                                               BP Readings from Last 3 Encounters:   06/10/22 (!) 170/59   05/25/22 (!) 180/78   05/18/22 136/62       NPO Status: Time of last liquid consumption: 2200                        Time of last solid consumption: 1800                        Date of last liquid consumption: 06/09/22                        Date of last solid food consumption: 06/09/22    BMI:   Wt Readings from Last 3 Encounters:   06/10/22 172 lb (78 kg)   05/25/22 177 lb (80.3 kg)   05/18/22 177 lb 6.4 oz (80.5 kg)     Body mass index is 25.4 kg/m². CBC:   Lab Results   Component Value Date    WBC 8.3 12/21/2021    RBC 4.77 12/21/2021    HGB 15.5 12/21/2021    HCT 45.9 12/21/2021    MCV 96.2 12/21/2021    RDW 14.5 12/21/2021     12/21/2021       CMP:   Lab Results   Component Value Date     12/21/2021    K 4.9 12/21/2021     12/21/2021    CO2 26 12/21/2021    BUN 14 12/21/2021    CREATININE 0.8 12/21/2021    GFRAA >60 12/21/2021    AGRATIO 1.7 12/21/2021    LABGLOM >60 12/21/2021    GLUCOSE 97 12/21/2021    PROT 6.9 12/21/2021    CALCIUM 9.5 12/21/2021    BILITOT 0.9 12/21/2021    ALKPHOS 66 12/21/2021    AST 14 12/21/2021    ALT 15 12/21/2021       POC Tests: No results for input(s): POCGLU, POCNA, POCK, POCCL, POCBUN, POCHEMO, POCHCT in the last 72 hours.     Coags:   Lab Results   Component Value Date    PROTIME 13.2 10/28/2014    INR 1.21 10/28/2014    APTT 36.1 02/03/2020       HCG (If Applicable): No results found for: PREGTESTUR, PREGSERUM, HCG, HCGQUANT ABGs:   Lab Results   Component Value Date    PHART 7.389 06/03/2014    PO2ART 69 06/03/2014    SCY9CBW 41 06/03/2014    DKG5GYE 24.8 06/03/2014    BEART 0 06/03/2014    N1XETCEE 93 06/03/2014        Type & Screen (If Applicable):  No results found for: LABABO, LABRH    Drug/Infectious Status (If Applicable):  No results found for: HIV, HEPCAB    COVID-19 Screening (If Applicable):   Lab Results   Component Value Date    COVID19 Detected 01/03/2022           Anesthesia Evaluation  Patient summary reviewed  Airway: Mallampati: II  TM distance: >3 FB   Neck ROM: full  Comment: Pt had jaw lock after his recent colonoscopy - this has happened a few times in the past. Partial lower. Mouth opening: > = 3 FB   Dental: normal exam   (+) partials      Pulmonary:normal exam  breath sounds clear to auscultation                             Cardiovascular:    (+) hypertension:, valvular problems/murmurs: AI, CAD:, CABG/stent:,         Rhythm: regular  Rate: normal                    Neuro/Psych:   (+) CVA:, depression/anxiety             GI/Hepatic/Renal:             Endo/Other:    (+) blood dyscrasia: VWd:., .                  ROS comment: DDAVP preop. [place], despite hearing aids Abdominal:             Vascular: Other Findings:           Anesthesia Plan      MAC     ASA 3       Induction: intravenous. Anesthetic plan and risks discussed with patient. Plan discussed with CRNA.     Attending anesthesiologist reviewed and agrees with Maribell Sevilla MD   6/10/2022

## 2022-06-10 NOTE — OP NOTE
Inguinal Hernia Operative Report      Patient: Smith Ruiz MRN: [id number]     YOB: 1950  Age: 70 y.o. Sex: male        Primary Care Physician: Shimon Lopez DO         DATE OF OPERATION: 6/10/2022     PREOPERATIVE DIAGNOSIS: right inguinal hernia    POSTOPERATIVE DIAGNOSIS: right inguinal hernia - indirect    PROCEDURE PERFORMED: right inguinal hernia repair with 3x6 inch Marlex mesh    SURGEON: Roopa Del Angel MD, MD     ANESTHESIA: MAC with local anesthetic. ASA CLASS: 3    ANTIBIOTICS: Ancef 2 grams IV. DVT PROPHYLAXIS: Bilateral pneumatic compression boots. INDICATIONS:   The patient was seen in the office for an increasingly symptomatic, right inguinal hernia. As a result, the risks, benefits, and alternatives were discussed at length, including bleeding, infection, nerve pain, and recurrence. The patient's questions were answered and they were agreeable to proceed. DESCRIPTION OF PROCEDURE:   The patient was brought to the operating room suite and placed in the supine position on the operating room table. Anesthesia was induced which he tolerated well. The right groin was clipped free of hair and then prepped and draped in the usual sterile fashion. A timeout was performed, and all parties were in agreement. After injecting local anesthetic, a skin incision was made in the natural skin crease in the right groin. This was carried down sharply through Stalin's fascia. The external oblique fascia was cleared off and then additional local anesthetic was injected subfascially. The external oblique was opened in the direction of its fibers and flaps were raised. The ilioinguinal nerve was identified and preserved. The spermatic cord and its contents were encircled with a Penrose drain. A moderate indirect hernia was appreciated and dissected free from the spermatic cord. The hernia sac was highly ligated with a 2-0 silk suture.  A patulous ring was noted secondary to large cord lipoma. The lipoma was reduced and transversalis muscles reapproximated over top with figure of 8 2-0 silk suture. No other pathology was noted. At that point, a 3 x 6 piece of Marlex mesh was cut to size in a keyhole manner. It was tacked to the pubic tubercle with a 2-0 PDS and this was run to tack the inferior edge of the mesh to the iliopubic tract. We tacked the mesh with interrupted sutures medially as well to the transversalis fascia. The internal inguinal ring was recreated by approximating the cut leaflets of the mesh. The tip of my forceps was able to pass through the newly created ring making sure it was not too tight. The inguinal nerve was then returned to its natural position. Hemostasis was achieved and the external oblique fascia was reapproximated with an 0 Vicryl. Scarpas was closed with 3-0 Vicryl, and the skin was closed with 4-0 Vicryl. Additional anesthetic was injected at the end of the case. Skin affix dermal adhesive was applied to the wound. Overall, the patient tolerated the procedure well. He was taken to PACU in stable condition. All counts were correct at the end of the case.     EBL: minimal    Specimen: hernia sac    Complications: none    Electronically signed by Jairo Leggett MD on 6/10/2022 at 8:06 AM

## 2022-06-16 ENCOUNTER — TELEPHONE (OUTPATIENT)
Dept: SURGERY | Age: 72
End: 2022-06-16

## 2022-06-16 NOTE — TELEPHONE ENCOUNTER
Spoke with patient and reassured him that he is healing well, what he is experiencing is normal, suggested he can take a wash cloth, roll it, and place under scrotum for elevation. Advised him if anything were to change he is to call us.

## 2022-06-16 NOTE — TELEPHONE ENCOUNTER
PT had surgery on 6/10 and would like to know if it is common for his testicles to be swelling and red. There is also a little seeping from the incision area. Please call PT regarding these concerns.

## 2022-06-22 ENCOUNTER — OFFICE VISIT (OUTPATIENT)
Dept: SURGERY | Age: 72
End: 2022-06-22

## 2022-06-22 VITALS — DIASTOLIC BLOOD PRESSURE: 68 MMHG | SYSTOLIC BLOOD PRESSURE: 158 MMHG | BODY MASS INDEX: 26.14 KG/M2 | WEIGHT: 177 LBS

## 2022-06-22 DIAGNOSIS — K40.90 RIGHT INGUINAL HERNIA: Primary | ICD-10-CM

## 2022-06-22 PROCEDURE — 99024 POSTOP FOLLOW-UP VISIT: CPT | Performed by: SURGERY

## 2022-06-22 RX ORDER — ESCITALOPRAM OXALATE 10 MG/1
1 TABLET ORAL
COMMUNITY

## 2022-06-22 NOTE — PROGRESS NOTES
Subjective:      Patient ID: Zain Nunez is a 70 y.o. male. HPI  S/p RIH repair. Doing fairly well. Took one narcotic postop otherwise pain controlled with tylenol. Now more of a discomfort. Reports scrotal swelling nearly resolved. Some bruising down proximal thigh improving as well  Review of Systems    Objective:   Physical Exam  Wound healed, small SQ hematoma  Repair intact  Aging ecchymoses proximal thigh  Assessment:       Diagnosis Orders   1. Right inguinal hernia             Plan:      Continue to elevate osiel-scrotum  Light activity another 4 weeks  Hematoma should absorb with time.   Will re-eval 2-3 weeks, may need aspirated        Irene Jimenez MD

## 2022-06-23 ENCOUNTER — OFFICE VISIT (OUTPATIENT)
Dept: INTERNAL MEDICINE CLINIC | Age: 72
End: 2022-06-23
Payer: MEDICARE

## 2022-06-23 VITALS
HEART RATE: 75 BPM | WEIGHT: 183 LBS | SYSTOLIC BLOOD PRESSURE: 134 MMHG | OXYGEN SATURATION: 98 % | RESPIRATION RATE: 14 BRPM | BODY MASS INDEX: 27.11 KG/M2 | HEIGHT: 69 IN | DIASTOLIC BLOOD PRESSURE: 62 MMHG

## 2022-06-23 DIAGNOSIS — E78.5 HYPERLIPIDEMIA, UNSPECIFIED HYPERLIPIDEMIA TYPE: ICD-10-CM

## 2022-06-23 DIAGNOSIS — Z12.5 SCREENING FOR PROSTATE CANCER: ICD-10-CM

## 2022-06-23 DIAGNOSIS — I25.10 CORONARY ARTERY DISEASE INVOLVING NATIVE CORONARY ARTERY OF NATIVE HEART WITHOUT ANGINA PECTORIS: ICD-10-CM

## 2022-06-23 DIAGNOSIS — D68.00 VON WILLEBRAND DISEASE: ICD-10-CM

## 2022-06-23 DIAGNOSIS — R73.01 IMPAIRED FASTING GLUCOSE: ICD-10-CM

## 2022-06-23 DIAGNOSIS — I10 BENIGN ESSENTIAL HTN: Primary | ICD-10-CM

## 2022-06-23 DIAGNOSIS — I35.1 AORTIC VALVE INSUFFICIENCY, ETIOLOGY OF CARDIAC VALVE DISEASE UNSPECIFIED: ICD-10-CM

## 2022-06-23 PROCEDURE — 1123F ACP DISCUSS/DSCN MKR DOCD: CPT | Performed by: INTERNAL MEDICINE

## 2022-06-23 PROCEDURE — 99214 OFFICE O/P EST MOD 30 MIN: CPT | Performed by: INTERNAL MEDICINE

## 2022-06-23 ASSESSMENT — PATIENT HEALTH QUESTIONNAIRE - PHQ9
1. LITTLE INTEREST OR PLEASURE IN DOING THINGS: 1
6. FEELING BAD ABOUT YOURSELF - OR THAT YOU ARE A FAILURE OR HAVE LET YOURSELF OR YOUR FAMILY DOWN: 1
SUM OF ALL RESPONSES TO PHQ QUESTIONS 1-9: 4
7. TROUBLE CONCENTRATING ON THINGS, SUCH AS READING THE NEWSPAPER OR WATCHING TELEVISION: 0
8. MOVING OR SPEAKING SO SLOWLY THAT OTHER PEOPLE COULD HAVE NOTICED. OR THE OPPOSITE, BEING SO FIGETY OR RESTLESS THAT YOU HAVE BEEN MOVING AROUND A LOT MORE THAN USUAL: 0
SUM OF ALL RESPONSES TO PHQ QUESTIONS 1-9: 4
SUM OF ALL RESPONSES TO PHQ QUESTIONS 1-9: 4
4. FEELING TIRED OR HAVING LITTLE ENERGY: 1
5. POOR APPETITE OR OVEREATING: 0
SUM OF ALL RESPONSES TO PHQ QUESTIONS 1-9: 4
2. FEELING DOWN, DEPRESSED OR HOPELESS: 1
9. THOUGHTS THAT YOU WOULD BE BETTER OFF DEAD, OR OF HURTING YOURSELF: 0
SUM OF ALL RESPONSES TO PHQ9 QUESTIONS 1 & 2: 2
3. TROUBLE FALLING OR STAYING ASLEEP: 0
10. IF YOU CHECKED OFF ANY PROBLEMS, HOW DIFFICULT HAVE THESE PROBLEMS MADE IT FOR YOU TO DO YOUR WORK, TAKE CARE OF THINGS AT HOME, OR GET ALONG WITH OTHER PEOPLE: 0

## 2022-06-23 NOTE — PROGRESS NOTES
[facility]) Physicians  Internal Medicine  Patient Encounter  Miguel Angel Jeffery D.O., [place]        Chief Complaint   Patient presents with   Lamont Proflorecita    Medication Check       HPI: 70 y.o. male seen today requesting a checkup regarding the status of current chronic medical problems as below along with medication review and reconciliation. He states he has been doing well since right inguinal hernia repair by Dr. Alex Gracia. He received the Humate-P for the VWD. CAD--He will see the cardiologist in the fall. He denies any chest pain, shortness of breath, orthopnea, or edema. He denies any syncopal episodes, palpitations or unusual fatigue. Hypertension--He does not check BP at home. He denies HA's, dizziness. He denies swelling. [place] disease--Previously----> Patient has a remote history of mucosal bleeding. He has had to receive presurgical treatment for major surgeries with Humate-P. No current bleeding. Impaired fasting glucose--Patient denies any problems with excessive thirst or frequent urination. He denies any weight loss or blurry vision. Lab Results   Component Value Date    LABA1C 4.9 12/21/2021     Lab Results   Component Value Date    EAG 93.9 12/21/2021         Hyperlipidemia--Patient remains on Lipitor 80 mg daily. He denies any adverse side effects. He denies any myalgias or muscle cramping. Lab Results   Component Value Date    [address] 12/21/2021       Depression--he feels he has been doing well. He feels a little down due to restriction following surgery. He gets upset about his poor hearing. He has not been on the Lexapro. He feels he is coping better even off of the medication.      PHQ Scores 6/23/2022 12/21/2021 12/16/2020 7/29/2020 1/7/2020 6/10/2019   PHQ2 Score 2 2 0 3 0 2   PHQ9 Score 4 2 0 10 0 2     Interpretation of Total Score Depression Severity: 1-4 = Minimal depression, 5-9 = Mild depression, 10-14 = Moderate depression, 15-19 = Moderately severe depression, 20-27 = Severe depression      Past Medical History:   Diagnosis Date    Acute ischemic stroke ([facility] 10/24/2014    Right    Anemia     Arthritis     rt knee    CAD (coronary artery disease) 05/29/2014    LAD, RCA, Diag - cariothoracic surgery    Cataracts, bilateral 07/2020    Colon polyps 02/03/2020    Tubular adenoma, Dr. Manan Doyle Depression     [place] (hard of hearing)     hermes-lateral hearing aids    HTN (hypertension)     Hyperlipidemia     Hypokalemia     Jaw dislocation     Von Willebrand disease ([facility]     Walking pneumonia          MEDICATIONS:  Medication Sig   escitalopram (LEXAPRO) 10 MG tablet Take 1 tablet by mouth   amLODIPine (NORVASC) 10 MG tablet TAKE 1 TABLET BY MOUTH EVERY DAY   atorvastatin (LIPITOR) 80 MG tablet TAKE 1 TABLET BY MOUTH EVERY DAY   metoprolol succinate (TOPROL XL) 100 MG extended release tablet TAKE 1 TABLET BY MOUTH EVERY DAY   lisinopril (PRINIVIL;ZESTRIL) 40 MG tablet TAKE 1 TABLET BY MOUTH EVERY DAY   aspirin 81 MG tablet Take 81 mg by mouth daily. Review of Systems - As per HPI      OBJECTIVE:  /62   Pulse 75   Resp 14   Ht 5' 9\" (1.753 m)   Wt 183 lb (83 kg)   SpO2 98%   BMI 27.02 kg/m²   GEN: NAD, A&O, Non-toxic  HEENT: NC/AT, SALLY, EOMI, Oral cavity Clear,  TM's NL, Nasal cavity clear. Very hard of hearing even with aides  NECK: Supple. No thyromegaly. No JVD  LYMPH: No C/SC nodes. CV: Regular rhythm. Rate normal.  1-5/3 systolic murmur.  + 2/6 diastolic murmur  PULM: CTA  EXT: No edema  GI: Abdomen is soft, NT, BS+, No hepatomegaly. No masses. NEURO: No focal or lateralizing deficits. VASC:  No carotid bruits. Pedal pulses symmetric, 2+  SKIN:  No rashes or lesions of concern        ASSESSMENT/PLAN:    1. Benign essential HTN  Blood pressure is fairly well controlled  Continue current medications  Monitor for hypotension.   Patient reminded to follow a no added salt diet  - CBC with Auto Differential; Future  - Comprehensive Metabolic Panel; Future  - Lipid Panel; Future    2. Hyperlipidemia, unspecified hyperlipidemia type  Condition stability and control are uncertain. Due for repeat lab  Continue to monitor for adverse side effects from statin therapy. Continue Lipitor as part of his overall cardiovascular disease risk reduction strategy-high intensity dosing  - Comprehensive Metabolic Panel; Future  - Lipid Panel; Future    3. Von Willebrand disease ([address]  No signs of bleeding or bruising at this time. Patient did receive Humate-P prior to surgery  There was excellent coordination of care between surgery and hematology. 4. Impaired fasting glucose  Condition stability and control are uncertain. Due for lab  Patient is asymptomatic  Continue efforts with Lifestyle modification including low calorie diet focusing on Low fat/low cholesterol and low carbohydrate intake, along with  increasing cardiovascular (aerobic) exercise. - Comprehensive Metabolic Panel; Future  - Hemoglobin A1C; Future    5. Coronary artery disease involving native coronary artery of native heart without angina pectoris  Condition is stable without signs of angina or anginal equivalents  Continue to monitor for signs of cardiac decompensation. Continue current medications including low-dose aspirin    6. Screening for prostate cancer    - PSA Screening; Future      7. Aortic insufficiency  Moderate on echo  Clearly has a diastolic murmur on exam  Continue to monitor Procardia      Discussed medications with patient who voiced understanding of their use, indication and potential side effects. Pt also understands the above recommendations. All questions answered. This note was generated completely or in part utilizing Dragon dictation speech recognition software. Occasionally, words are mistranscribed and despite editing, the text may contain inaccuracies due to incorrect word recognition.   If further clarification is needed please contact the office at [phone]       Electronically signed    Nohelia Maguire D.O.

## 2022-07-06 ENCOUNTER — OFFICE VISIT (OUTPATIENT)
Dept: SURGERY | Age: 72
End: 2022-07-06

## 2022-07-06 VITALS — BODY MASS INDEX: 27.02 KG/M2 | WEIGHT: 183 LBS

## 2022-07-06 DIAGNOSIS — K40.90 RIGHT INGUINAL HERNIA: Primary | ICD-10-CM

## 2022-07-06 PROCEDURE — 99024 POSTOP FOLLOW-UP VISIT: CPT | Performed by: SURGERY

## 2022-07-06 NOTE — PROGRESS NOTES
Subjective:      Patient ID: Sanjay López is a 70 y.o. male. HPI  S/p RIH repair 4 weeks ago. Doing well. Occasional discomfort otherwise pain resolved. Regular BMs. Swelling continues to decrease as well  Review of Systems    Objective:   Physical Exam  Aging ecchymoses right upper thigh nearly resolved  Hematoma R groin resolved as well  Wound healed  Assessment:       Diagnosis Orders   1.  Right inguinal hernia               Plan:      Recovering well  Continue light lifting another 2 weeks  Return PRN        Sathish Espinoza MD

## 2022-07-29 ENCOUNTER — TELEPHONE (OUTPATIENT)
Dept: INTERNAL MEDICINE CLINIC | Age: 72
End: 2022-07-29

## 2022-07-29 NOTE — TELEPHONE ENCOUNTER
Patient is requesting a referral to a specialist for his ears for a thorough ear exam.  He is wanting to know if there is something he can do beyond hearing aides. Please contact patient to advise.

## 2022-07-30 NOTE — TELEPHONE ENCOUNTER
Please schedule for either phone consult or in person visit to discuss problem and determine best course of action.

## 2022-08-15 ENCOUNTER — OFFICE VISIT (OUTPATIENT)
Dept: INTERNAL MEDICINE CLINIC | Age: 72
End: 2022-08-15
Payer: MEDICARE

## 2022-08-15 VITALS
HEART RATE: 71 BPM | HEIGHT: 69 IN | WEIGHT: 175.8 LBS | DIASTOLIC BLOOD PRESSURE: 60 MMHG | OXYGEN SATURATION: 98 % | RESPIRATION RATE: 14 BRPM | BODY MASS INDEX: 26.04 KG/M2 | SYSTOLIC BLOOD PRESSURE: 132 MMHG

## 2022-08-15 DIAGNOSIS — H90.3 BILATERAL SENSORINEURAL HEARING LOSS: Primary | ICD-10-CM

## 2022-08-15 PROCEDURE — 99213 OFFICE O/P EST LOW 20 MIN: CPT | Performed by: INTERNAL MEDICINE

## 2022-08-15 PROCEDURE — 1123F ACP DISCUSS/DSCN MKR DOCD: CPT | Performed by: INTERNAL MEDICINE

## 2022-08-15 NOTE — PROGRESS NOTES
[facility]) Physicians  Internal Medicine  Patient Encounter  Naty Strange D.O., [facility]        Chief Complaint   Patient presents with    Referral - General     ENT referral        HPI: 67 y.o. male seen today with ongoing complaints of poor hearing. He has been to three hearing centers. He has hearing aides. [facility] went [facility]. His aides are working, but he does not feel they are working well. The hearing test remained the same per pt report. He has been told there is nothing else that can be done beyond hearing aides. He is worse when in a group of people. He states he wants to be sure that there is nothing else that can be done. He gets very frustrated with this difficulty. He struggles when he is in a social situation. He avoids social situations because he can't hear well (Voice discrimination). Past Medical History:   Diagnosis Date    Acute ischemic stroke ([facility] 10/24/2014    Right    Anemia     Arthritis     rt knee    Bilateral sensorineural hearing loss 5/9/2019    CAD (coronary artery disease) 05/29/2014    LAD, RCA, Diag - cariothoracic surgery    Cataracts, bilateral 07/2020    Colon polyps 02/03/2020    Tubular adenoma, Dr. Fredo Bazzi    Depression     [place] (hard of hearing)     hermes-lateral hearing aids    HTN (hypertension)     Hyperlipidemia     Hypokalemia     Jaw dislocation     Von Willebrand disease ([facility]     Walking pneumonia          MEDICATIONS:  Prior to Visit Medications    Medication Sig Taking?  Authorizing Provider   escitalopram (LEXAPRO) 10 MG tablet Take 1 tablet by mouth Yes Historical Provider, MD   amLODIPine (NORVASC) 10 MG tablet TAKE 1 TABLET BY MOUTH EVERY DAY Yes Albert Lopez, DO   atorvastatin (LIPITOR) 80 MG tablet TAKE 1 TABLET BY MOUTH EVERY DAY Yes Albert Lopez, DO   metoprolol succinate (TOPROL XL) 100 MG extended release tablet TAKE 1 TABLET BY MOUTH EVERY DAY Yes Diane M Enzweiler, APRN - CNP   lisinopril (PRINIVIL;ZESTRIL) 40 MG tablet TAKE 1 TABLET BY MOUTH EVERY DAY Yes Yolanda Mixon, APRN - CNP   aspirin 81 MG tablet Take 81 mg by mouth daily. Yes Historical Provider, MD           Review of Systems - As per HPI      OBJECTIVE:  Vitals:    08/15/22 1105   BP: 132/60   Pulse: 71   Resp: 14   SpO2: 98%   Weight: 175 lb 12.8 oz (79.7 kg)   Height: 5' 9\" (1.753 m)     Body mass index is 25.96 kg/m². Wt Readings from Last 3 Encounters:   08/15/22 175 lb 12.8 oz (79.7 kg)   07/06/22 183 lb (83 kg)   06/23/22 183 lb (83 kg)     BP Readings from Last 3 Encounters:   08/15/22 132/60   06/23/22 134/62   06/22/22 (!) 158/68        Deferred      ASSESSMENT/PLAN:    1. Bilateral sensorineural hearing loss  Long discussion regarding plan of care regarding his hearing loss  The patient is struggling emotionally  I recommended speech therapy and she will consider  Consider referral to ENT at  to consider any other options that might be available to him such as a bone-anchored hearing system (cochlear implant)  I have also suggested counseling may be necessary to help him navigate his emotions. I have discussed the importance of staying social and to avoid social isolation. On this date 8/15/2022 I have spent 35 minutes reviewing previous notes, test results and face to face with the patient discussing the diagnosis and importance of compliance with the treatment plan as well as documenting on the day of the visit. Discussed medications with patient who voiced understanding of their use, indication and potential side effects. Pt also understands the above recommendations. All questions answered. This note was generated completely or in part utilizing Dragon dictation speech recognition software. Occasionally, words are mistranscribed and despite editing, the text may contain inaccuracies due to incorrect word recognition.   If further clarification is needed please contact the office at [phone]       Electronically

## 2022-08-15 NOTE — PATIENT INSTRUCTIONS
TO Do List:    #1 Consider Speech therapy to help develop strategies to navigate voice/sound discrimination. #2 Stay social.  Do not avoid social situations. #3 Consider consultation with ENT at [facility]-- Dr. Karina Batres.

## 2022-08-31 DIAGNOSIS — I10 BENIGN ESSENTIAL HTN: ICD-10-CM

## 2022-08-31 DIAGNOSIS — E78.5 HYPERLIPIDEMIA, UNSPECIFIED HYPERLIPIDEMIA TYPE: ICD-10-CM

## 2022-08-31 RX ORDER — ATORVASTATIN CALCIUM 80 MG/1
TABLET, FILM COATED ORAL
Qty: 90 TABLET | Refills: 1 | Status: SHIPPED | OUTPATIENT
Start: 2022-08-31

## 2022-08-31 RX ORDER — AMLODIPINE BESYLATE 10 MG/1
TABLET ORAL
Qty: 90 TABLET | Refills: 1 | Status: SHIPPED | OUTPATIENT
Start: 2022-08-31

## 2022-08-31 NOTE — TELEPHONE ENCOUNTER
Last ov: 10/26/21  Last labs: 6/23/22  Next appointment:  Last RF: 6/3/22    LM for pt to return our call

## 2022-09-07 RX ORDER — LISINOPRIL 40 MG/1
TABLET ORAL
Qty: 90 TABLET | Refills: 1 | Status: SHIPPED | OUTPATIENT
Start: 2022-09-07

## 2022-10-10 NOTE — PROGRESS NOTES
POLYPECTOMY SNARE/COLD BIOPSY performed by Marcus Hendrickson MD at [address]      2015    HERNIA REPAIR Right 6/10/2022    RIGHT INGUINAL HERNIA REPAIR WITH MESH performed by Gudelia Moeller MD at [address] [facility] Right 7/31/2020    Phacoemulsification with intraocular lens implant performed by Ariel Perez MD at [address] Left 8/6/2020    Phacoemulsification with intraocular lens implant performed by Ariel Perez MD at [address] [facility] Right 10/21/14    right total knee replacement    TONSILLECTOMY AND ADENOIDECTOMY  1955     Family History   Problem Relation Age of Onset    Other Mother 35        blood clot    High Blood Pressure Father     Heart Disease Father 54        CAD, CABG    Clotting Disorder Sister         Von Willebrand's     Social History     Tobacco Use    Smoking status: Never    Smokeless tobacco: Never   Vaping Use    Vaping Use: Never used   Substance Use Topics    Alcohol use: Yes     Alcohol/week: 3.0 standard drinks     Types: 3 Cans of beer per week     Comment: rare    Drug use: No     No Known Allergies  Current Outpatient Medications   Medication Sig Dispense Refill    lisinopril (PRINIVIL;ZESTRIL) 40 MG tablet TAKE 1 TABLET BY MOUTH EVERY DAY 90 tablet 1    atorvastatin (LIPITOR) 80 MG tablet TAKE 1 TABLET BY MOUTH EVERY DAY 90 tablet 1    amLODIPine (NORVASC) 10 MG tablet TAKE 1 TABLET BY MOUTH EVERY DAY 90 tablet 1    escitalopram (LEXAPRO) 10 MG tablet Take 1 tablet by mouth      metoprolol succinate (TOPROL XL) 100 MG extended release tablet TAKE 1 TABLET BY MOUTH EVERY DAY 90 tablet 2    aspirin 81 MG tablet Take 81 mg by mouth daily. No current facility-administered medications for this visit. Review of Systems:  Constitutional: No unanticipated weight loss.  There's been no change in energy level, sleep pattern, or activity level. No fevers, chills. Eyes: No visual changes or diplopia. No scleral icterus. ENT: No Headaches, hearing loss or vertigo. No mouth sores or sore throat. Cardiovascular: as reviewed in HPI  Respiratory: No cough or wheezing, no sputum production. No hemoptysis. Gastrointestinal: No abdominal pain, appetite loss, blood in stools. No change in bowel or bladder habits. Genitourinary: No dysuria, trouble voiding, or hematuria. Musculoskeletal:  No gait disturbance, no joint complaints. Integumentary: No rash or pruritis. Neurological: No headache, diplopia, change in muscle strength, numbness or tingling. Psychiatric: No anxiety or depression. Endocrine: No temperature intolerance. No excessive thirst, fluid intake, or urination. No tremor. Hematologic/Lymphatic: No abnormal bruising or bleeding, blood clots or swollen lymph nodes. Allergic/Immunologic: No nasal congestion or hives. Physical Exam:   /62   Pulse 64   Ht 5' 9\" (1.753 m)   Wt 160 lb (72.6 kg)   SpO2 98%   BMI 23.63 kg/m²   Wt Readings from Last 3 Encounters:   10/11/22 160 lb (72.6 kg)   08/15/22 175 lb 12.8 oz (79.7 kg)   07/06/22 183 lb (83 kg)     Constitutional: He is oriented to person, place, and time. He appears well-developed and well-nourished. In no acute distress. Head: Normocephalic and atraumatic. Pupils equal and round. Neck: Neck supple. No JVP or carotid bruit appreciated. No mass and no thyromegaly present. No lymphadenopathy present. Cardiovascular: Normal rate, normal heart sounds and intact distal pulses. Exam reveals no gallop and no friction rub. III/VI diastolic murmur heard. Pulmonary/Chest: Effort normal and breath sounds normal. No respiratory distress. He has no wheezes, rhonchi or rales. Well healed median sternotomy scar. Abdominal: Soft, non-tender. Bowel sounds are normal. He exhibits no organomegaly, mass or bruit. Extremities: No edema. No cyanosis or clubbing.  Pulses are 2+ radial/dorsalis pedis and posterior tibial bilaterally. S/p R-TKR. Neurological: He is alert and oriented to person, place, and time. No gross cranial nerve deficit. Coordination normal.   Skin: Skin is warm and dry. There is no rash or diaphoresis. Psychiatric: He has a normal mood and affect. His speech is normal and behavior is normal.     Lab Review:   Lab Results   Component Value Date/Time    TRIG 65 06/23/2022 11:54 AM    HDL 50 06/23/2022 11:54 AM    LDLCALC 63 06/23/2022 11:54 AM    LABVLDL 13 06/23/2022 11:54 AM     Lab Results   Component Value Date/Time    BUN 13 06/23/2022 11:54 AM    CREATININE 0.8 06/23/2022 11:54 AM     EKG Interpretation: 5/16/14 Sinus rhythm. LVH with QRS widening. 10/11/18 Sinus  Bradycardia. Voltage criteria for LVH. IVCD  10/17/19 Sinus rhythm. Voltage criteria for LVH. IVCD. 10/26/21 Sinus rhythm. Voltage criteria for LVH. Nonspecific ST abnormality and QRS widening. 10/11/2: Sinus rhythm with ectopic ventricular beat, LVH with QRS widening. ~64 bpm       Image Review:   Lexiscan: 6/2014  Abnormal study with a reversible defect of the apical anterior wall and apex consistent with ischemia. There is also a fixed defect of the basal to mid inferior wall. 2) The LV is severely dilated with moderately reduced systolic function (EF 23%). There is moderate diffuse hypokinesis and severe hypokinesis of the apex. 3) The ECG portion of the stress test is abnormal with significant ST depression which occurs late into recovery. 4) Uncontrolled hypertension. Echo 5/20/14  Normal left ventricle size. Concentric left ventricular hypertrophy. Normal systolic function with an estimated ejection fraction of 50-55%. No regional wall motion abnormalities are seen. Diastolic dysfunction is present. Trivial mitral regurgitation is present. Mild aortic regurgitation. Trivial tricuspid regurgitation. Cath: 5/29/14   1.  The left main trifurcates into a left anterior descending, a ramus intermedius and a left circumflex artery. The left main was angiographically free of significant disease. 2. The left anterior descending is a normal caliber vessel that follows a normal anatomical course. There was a complex bifurcating lesion in the proximal segment that involved the first diagonal branch. Again in this proximal segment at the level of the diagonal branch there was a 90% stenosis as well as a 90% stenosis immediately after the diagonal branch. The diagonal branch itself is a large bifurcating vessel; the upper limb of the vessel appears angiographically free of significant disease. The lower pole   of this bifurcating left anterior descending has sequential 95% stenoses. The remainder of the mid LAD and distal LAD have only minimal disease but no focal obstructive defects. 3. The ramus intermedius is a large caliber vessel. There is a 30% to 40% stenosis in the proximal segment of this vessel. 4. The left circumflex artery is a moderate size vessel. There is 1 small-to-moderate size obtuse marginal branch. The left circumflex and obtuse marginal have mild luminal irregularities but no significant obstructive defects. 5. The right coronary artery is a large dominant vessel supplying the PDA. The right coronary artery has a focal 95% stenosis in the proximal segment. CABG: LIMA->LAD, SVG->D2->D1, SVG->distal RCA. Echo 10/27/14  Extremely poor quality images. Definity contrast used to visualize left ventricle. Normal left ventricular size and systolic function. LV ejection fraction is visually estimated to be 50%. Normal right ventricular size and function. The valvular structures are not well seen but there are no significant valvular abnormalities by Doppler. No intracardiac source for embolus identified. Echo 7/11/2017  Left ventricle size is normal. Mild concentric left ventricular hypertrophy is present.  Global ejection fraction is mildly decreased and estimated at approximately 45 to 50%. No regional wall motion abnormalities are noted. Normal diastolic function. The left atrium is mildly dilated. The aortic valve is mildly thickened, opens well with normal gradients. Moderate aortic regurgitation is present. There is mild tricuspid regurgitation with RVSP estimated at 33 mmHg. Estimated right atrial pressure is 5 mmHg. Echo 11/1/19  Concentric LVH with normal LV size and wall motion. EF is  50-55%. The left atrium is dilated. Mild Mitral, Aortic and Tricuspid Regurgitation. Normal right ventricular size and function. Doppler derived Pulmonary artery systolic pressure (PASP) is  34 mmHg . Echo: 11/2021  Moderately dilated LV size, wall thickness and motion. EF is  55%. Mild mitral regurgitation. The left atrium is severely dilated. Moderate aortic regurgitation. Mildly dilated right ventricle. Right ventricular systolic function is  normal.  Mild tricuspid regurgitation. Estimated pulmonary artery systolic pressure is normal at 31 mmHg assuming a right atrial pressure of 3 mmHg. Assessment/Plan:   1) CAD s/p CABG. Seemingly asymptomatic. Continue with medical management and risk factor modification including aspirin, statin, and Toprol  mg daily. 2) Essential hypertension. Controlled. Goal BP <130/80. Continue current medical therapy. 3) Hyperlipidemia. LDL 57. Continue Lipitor to 80 mg daily. 4) Aortic insufficiency. Moderate per report last year and patient with a loud diastolic murmur. Will repeat echocardiogram and continue to monitor clinically. 5) Von Willebrand disease. Continues tolerating ASA 81mg daily. 6) History of CVA. Continue ASA and statin. We will call with echo results and plan follow up in 1 year. Thank you very much for allowing me to participate in the care of your patient. Please do not hesitate to contact me if you have any questions. Sincerely,  Claus Bingham.  Stephan Crocker, [address] [facility]  [phone] [place] [address]  Ph: [phone]  Fax: [phone]      This note was scribed in the presence of Dr. María Elena Brock MD by Elmira Stallworth RN. Physician Attestation: The scribes documentation has been prepared under my direction and personally reviewed by me in its entirety. I confirm that the note above accurately reflects all work, treatment, procedures, and medical decision making performed by me. All portions of the note including but not limited to the chief complaint, history of present illness, physical exam, assessment and plan/medical decision making were personally reviewed, edited, and updated on the day of the visit.

## 2022-10-11 ENCOUNTER — OFFICE VISIT (OUTPATIENT)
Dept: CARDIOLOGY CLINIC | Age: 72
End: 2022-10-11
Payer: MEDICARE

## 2022-10-11 VITALS
HEIGHT: 69 IN | BODY MASS INDEX: 23.7 KG/M2 | DIASTOLIC BLOOD PRESSURE: 62 MMHG | SYSTOLIC BLOOD PRESSURE: 136 MMHG | HEART RATE: 64 BPM | OXYGEN SATURATION: 98 % | WEIGHT: 160 LBS

## 2022-10-11 DIAGNOSIS — I35.1 NONRHEUMATIC AORTIC VALVE INSUFFICIENCY: ICD-10-CM

## 2022-10-11 DIAGNOSIS — E78.2 MIXED HYPERLIPIDEMIA: ICD-10-CM

## 2022-10-11 DIAGNOSIS — I10 ESSENTIAL HYPERTENSION: ICD-10-CM

## 2022-10-11 DIAGNOSIS — Z95.1 S/P CABG (CORONARY ARTERY BYPASS GRAFT): ICD-10-CM

## 2022-10-11 DIAGNOSIS — D68.00 VON WILLEBRAND DISEASE: ICD-10-CM

## 2022-10-11 DIAGNOSIS — I25.810 CORONARY ARTERY DISEASE INVOLVING CORONARY BYPASS GRAFT OF NATIVE HEART WITHOUT ANGINA PECTORIS: Primary | ICD-10-CM

## 2022-10-11 DIAGNOSIS — Z86.73 H/O: CVA (CEREBROVASCULAR ACCIDENT): ICD-10-CM

## 2022-10-11 PROCEDURE — 99214 OFFICE O/P EST MOD 30 MIN: CPT | Performed by: INTERNAL MEDICINE

## 2022-10-11 PROCEDURE — 93000 ELECTROCARDIOGRAM COMPLETE: CPT | Performed by: INTERNAL MEDICINE

## 2022-10-11 PROCEDURE — 1123F ACP DISCUSS/DSCN MKR DOCD: CPT | Performed by: INTERNAL MEDICINE

## 2022-10-13 ENCOUNTER — HOSPITAL ENCOUNTER (OUTPATIENT)
Dept: NON INVASIVE DIAGNOSTICS | Age: 72
Discharge: HOME | End: 2022-10-13
Payer: MEDICARE

## 2022-10-13 DIAGNOSIS — I35.1 NONRHEUMATIC AORTIC VALVE INSUFFICIENCY: ICD-10-CM

## 2022-10-13 LAB
LV EF: 50 %
LVEF MODALITY: NORMAL

## 2022-10-13 PROCEDURE — 93306 TTE W/DOPPLER COMPLETE: CPT

## 2022-10-18 DIAGNOSIS — I35.1 NONRHEUMATIC AORTIC VALVE INSUFFICIENCY: Primary | ICD-10-CM

## 2022-10-18 NOTE — RESULT ENCOUNTER NOTE
Genia Levin is now scheduled for November the 30th at 1:30 spoke with him v/u time/date/location/prep

## 2022-10-28 ENCOUNTER — OFFICE VISIT (OUTPATIENT)
Dept: INTERNAL MEDICINE CLINIC | Age: 72
End: 2022-10-28
Payer: MEDICARE

## 2022-10-28 VITALS
WEIGHT: 178 LBS | BODY MASS INDEX: 26.36 KG/M2 | SYSTOLIC BLOOD PRESSURE: 122 MMHG | HEART RATE: 65 BPM | DIASTOLIC BLOOD PRESSURE: 58 MMHG | OXYGEN SATURATION: 99 % | HEIGHT: 69 IN

## 2022-10-28 DIAGNOSIS — D64.9 NORMOCYTIC ANEMIA: ICD-10-CM

## 2022-10-28 DIAGNOSIS — I35.1 AORTIC VALVE INSUFFICIENCY, ETIOLOGY OF CARDIAC VALVE DISEASE UNSPECIFIED: ICD-10-CM

## 2022-10-28 DIAGNOSIS — I34.0 MITRAL VALVE INSUFFICIENCY, UNSPECIFIED ETIOLOGY: ICD-10-CM

## 2022-10-28 DIAGNOSIS — R73.01 IMPAIRED FASTING GLUCOSE: ICD-10-CM

## 2022-10-28 DIAGNOSIS — D68.00 VON WILLEBRAND DISEASE: ICD-10-CM

## 2022-10-28 DIAGNOSIS — I10 BENIGN ESSENTIAL HTN: Primary | ICD-10-CM

## 2022-10-28 DIAGNOSIS — I25.10 CORONARY ARTERY DISEASE INVOLVING NATIVE CORONARY ARTERY OF NATIVE HEART WITHOUT ANGINA PECTORIS: ICD-10-CM

## 2022-10-28 DIAGNOSIS — H90.3 BILATERAL SENSORINEURAL HEARING LOSS: ICD-10-CM

## 2022-10-28 DIAGNOSIS — E78.5 HYPERLIPIDEMIA, UNSPECIFIED HYPERLIPIDEMIA TYPE: ICD-10-CM

## 2022-10-28 PROCEDURE — 3078F DIAST BP <80 MM HG: CPT | Performed by: INTERNAL MEDICINE

## 2022-10-28 PROCEDURE — 3074F SYST BP LT 130 MM HG: CPT | Performed by: INTERNAL MEDICINE

## 2022-10-28 PROCEDURE — 1123F ACP DISCUSS/DSCN MKR DOCD: CPT | Performed by: INTERNAL MEDICINE

## 2022-10-28 PROCEDURE — 99214 OFFICE O/P EST MOD 30 MIN: CPT | Performed by: INTERNAL MEDICINE

## 2022-10-28 NOTE — PROGRESS NOTES
[facility] Physicians  Internal Medicine  Patient Encounter  Tad Kohler D.O., [facility]        Chief Complaint   Patient presents with    Check-Up       HPI: 67 y.o. male seen today requesting a checkup regarding the status of current chronic medical problems as below along with medication review and reconciliation. He states he feels well. He is active-- yard work and walking. He continues to struggle hearing. He has profound sensorineural hearing loss. CAD/aortic regurgitation--patient was last seen by Dr. Lefty Aggarwal on 10/11/2022. Office note reviewed. Patient has history of CABG in 2015. He denies any chest pain, shortness of breath, orthopnea, or edema. He denies any syncopal episodes, palpitations or unusual fatigue. A new echocardiogram was ordered and completed on 10/13/2022:  Summary   Markedly dilated LV with normal wall motion; EF    50%. Indeterminate   diastolic function. Mild to moderate mitral regurgitation. Severe left atrial dilation. The right ventricle is normal in size and function. Mild to moderate dilation of the aortic root. Mild thickening of the aortic valve leaflets. Moderate to severe aortic   insufficiency. AI jet is eccentric. Mild tricuspid regurgitation   Systolic pulmonary artery pressure (SPAP) estimated at 47 mmHg (estimated RA   pressure of 8 mmHg included). A cardiac MRI has been ordered. 11/30/2022. He is discouraged that there is potentially worsening problem. Hypertension--Patient denies any headaches, lightheadedness, dizziness, swelling, syncope, episodes of unilateral weakness, new speech or new visual disturbances. He does have history of a CVA following total knee replacement in November 2014. He had developed left facial droop and left-sided weakness. Pranav Bijou disease--Previously----> Patient has a remote history of mucosal bleeding.   He has had to receive presurgical treatment for major surgeries with Humate-P. No current bleeding problems. Impaired fasting glucose--Patient denies any problems with excessive thirst or frequent urination. He denies any weight loss or blurry vision. Lab Results   Component Value Date    LABA1C 4.9 06/23/2022     Lab Results   Component Value Date    EAG 93.9 06/23/2022         Hyperlipidemia--Patient remains on Lipitor 80 mg daily. He denies any adverse side effects. He denies any myalgias or muscle cramping. No muscle weakness  Lab Results   Component Value Date    [facility] 63 06/23/2022       Depression--he feels he has been doing well. He is on Lexapro 10 mg daily. He tolerates the medication. He still may have some mood swings. He struggles emotionally trying to accept that he has several medical issues. He also is bothered [place]. Past Medical History:   Diagnosis Date    Acute ischemic stroke ([facility] [address] 10/24/2014    Right    Anemia     Arthritis     rt knee    Bilateral sensorineural hearing loss 5/9/2019    CAD (coronary artery disease) 05/29/2014    LAD, RCA, Diag - cariothoracic surgery    Cataracts, bilateral 07/2020    Colon polyps 02/03/2020    Tubular adenoma, Dr. Gavin Frazier    Depression     [place] (hard of hearing)     hermes-lateral hearing aids    HTN (hypertension)     Hyperlipidemia     Hypokalemia     Jaw dislocation     Von Willebrand disease     Walking pneumonia          MEDICATIONS:  Prior to Visit Medications    Medication Sig   lisinopril (PRINIVIL;ZESTRIL) 40 MG tablet TAKE 1 TABLET BY MOUTH EVERY DAY   atorvastatin (LIPITOR) 80 MG tablet TAKE 1 TABLET BY MOUTH EVERY DAY   amLODIPine (NORVASC) 10 MG tablet TAKE 1 TABLET BY MOUTH EVERY DAY   escitalopram (LEXAPRO) 10 MG tablet Take 1 tablet by mouth   metoprolol succinate (TOPROL XL) 100 MG extended release tablet TAKE 1 TABLET BY MOUTH EVERY DAY   aspirin 81 MG tablet Take 81 mg by mouth daily.             Review of Systems - As per HPI      OBJECTIVE:  Vitals:    10/28/22 0930   BP: (!) 122/58 asymptomatic  Continue to monitor for signs of angina and cardiac decompensation  Follow-up with cardiology  - Comprehensive Metabolic Panel; Future  - Lipid Panel; Future    7. Aortic valve insufficiency, etiology of cardiac valve disease unspecified      8. Mitral valve insufficiency, unspecified etiology  As above    9. Normocytic anemia    - CBC with Auto Differential; Future      Recommended COVID-19 vaccine updated booster    Discussed medications with patient who voiced understanding of their use, indication and potential side effects. Pt also understands the above recommendations. All questions answered. This note was generated completely or in part utilizing Dragon dictation speech recognition software. Occasionally, words are mistranscribed and despite editing, the text may contain inaccuracies due to incorrect word recognition.   If further clarification is needed please contact the office at [phone]       Electronically signed    Jabari Maloney D.O.

## 2022-11-30 ENCOUNTER — HOSPITAL ENCOUNTER (OUTPATIENT)
Dept: MRI IMAGING | Age: 72
Discharge: HOME | End: 2022-11-30
Payer: MEDICARE

## 2022-11-30 DIAGNOSIS — I35.1 NONRHEUMATIC AORTIC VALVE INSUFFICIENCY: ICD-10-CM

## 2022-11-30 PROCEDURE — 75557 CARDIAC MRI FOR MORPH: CPT

## 2022-12-06 ENCOUNTER — TELEPHONE (OUTPATIENT)
Dept: CARDIOLOGY CLINIC | Age: 72
End: 2022-12-06

## 2022-12-06 NOTE — TELEPHONE ENCOUNTER
Karen Cooper called in returning Syeda's, RN phone call regarding his MRI. I informed Karen Cooper that she will call him back when able to.     Karen Cooper can be reached at: [phone]

## 2022-12-07 ENCOUNTER — OFFICE VISIT (OUTPATIENT)
Dept: CARDIOLOGY CLINIC | Age: 72
End: 2022-12-07
Payer: MEDICARE

## 2022-12-07 VITALS
BODY MASS INDEX: 26.51 KG/M2 | HEIGHT: 69 IN | OXYGEN SATURATION: 98 % | WEIGHT: 179 LBS | DIASTOLIC BLOOD PRESSURE: 60 MMHG | HEART RATE: 66 BPM | SYSTOLIC BLOOD PRESSURE: 138 MMHG

## 2022-12-07 DIAGNOSIS — D68.00 VON WILLEBRAND DISEASE: ICD-10-CM

## 2022-12-07 DIAGNOSIS — Z86.73 H/O: CVA (CEREBROVASCULAR ACCIDENT): ICD-10-CM

## 2022-12-07 DIAGNOSIS — Z95.1 S/P CABG (CORONARY ARTERY BYPASS GRAFT): ICD-10-CM

## 2022-12-07 DIAGNOSIS — I10 ESSENTIAL HYPERTENSION: ICD-10-CM

## 2022-12-07 DIAGNOSIS — E78.2 MIXED HYPERLIPIDEMIA: ICD-10-CM

## 2022-12-07 DIAGNOSIS — I25.810 CORONARY ARTERY DISEASE INVOLVING CORONARY BYPASS GRAFT OF NATIVE HEART WITHOUT ANGINA PECTORIS: ICD-10-CM

## 2022-12-07 DIAGNOSIS — I35.1 NONRHEUMATIC AORTIC VALVE INSUFFICIENCY: Primary | ICD-10-CM

## 2022-12-07 PROCEDURE — 3078F DIAST BP <80 MM HG: CPT | Performed by: INTERNAL MEDICINE

## 2022-12-07 PROCEDURE — 3074F SYST BP LT 130 MM HG: CPT | Performed by: INTERNAL MEDICINE

## 2022-12-07 PROCEDURE — 99214 OFFICE O/P EST MOD 30 MIN: CPT | Performed by: INTERNAL MEDICINE

## 2022-12-07 PROCEDURE — 1123F ACP DISCUSS/DSCN MKR DOCD: CPT | Performed by: INTERNAL MEDICINE

## 2022-12-07 RX ORDER — METOPROLOL SUCCINATE 50 MG/1
50 TABLET, EXTENDED RELEASE ORAL DAILY
Qty: 30 TABLET | Refills: 4 | Status: SHIPPED | OUTPATIENT
Start: 2022-12-07

## 2022-12-07 NOTE — PROGRESS NOTES
[facility]      Cardiology Progress Note    Malachi Temple  1950 December 7, 2022    Primary Physician: Dr. Candace Amador  Reason for Visit: CAD s/p CABG, AI     CC: \"I'm feeling okay. \"     HPI:  The patient is 67 y.o. male with Lovenia Edmond disease, CAD s/p CABG (2014), aortic insufficiency, hypertension, and hyperlipidemia who presents for management of CAD and AI. He originally presented with typical exertional angina chest pain. Angiogram showed multivessel disease and the patient had bypass surgery. His post-operative course was uncomplicated. Later, he had a total right knee replacement which was complicated by a small CVA (2014). He denied any residual deficit from the CVA. He was followed chronically in office but more recently had increasing intensity/loudness of his diastolic murmur. His repeat echocardiogram showed an EF at 50% with moderate to severe AI. His cardiac MRI findings were consistent with severe aortic insufficiency with an EF of 48% and severely dilated LV. Today, he is accompanied by his son. He denies any new or recurrent cardiac sounding complaints. He continues to remain active without any exertional symptoms. Patient denies exertional chest pain/pressure, dyspnea at rest, worsening KING, PND, orthopnea, palpitations, lightheadedness, weight changes, changes in LE edema, and syncope. Patient reports compliance to his medications.     Past Medical History:   Diagnosis Date    Acute ischemic stroke ([facility] [address] 10/24/2014    Right    Anemia     Arthritis     rt knee    Bilateral sensorineural hearing loss 5/9/2019    CAD (coronary artery disease) 05/29/2014    LAD, RCA, Diag - cariothoracic surgery    Cataracts, bilateral 07/2020    Colon polyps 02/03/2020    Tubular adenoma, Dr. Sintia Muñiz    Depression     Elem (hard of hearing)     hermes-lateral hearing aids    HTN (hypertension)     Hyperlipidemia     Hypokalemia     Jaw dislocation     Von Willebrand disease Walking pneumonia      Past Surgical History:   Procedure Laterality Date    COLONOSCOPY  02/03/2020    Dr. Andrew Jain N/A 2/3/2020    COLONOSCOPY POLYPECTOMY SNARE/COLD BIOPSY performed by Yin Landaverde MD at [facility] 1997 2015    HERNIA REPAIR Right 6/10/2022    RIGHT INGUINAL HERNIA REPAIR WITH MESH performed by Lillian Muñiz MD at [facility] Right 7/31/2020    Phacoemulsification with intraocular lens implant performed by Vignesh Harry MD at [address] Left 8/6/2020    Phacoemulsification with intraocular lens implant performed by Vignesh Harry MD at [facility] Right 10/21/14    right total knee replacement    TONSILLECTOMY AND ADENOIDECTOMY  1955     Family History   Problem Relation Age of Onset    Other Mother 35        blood clot    High Blood Pressure Father     Heart Disease Father 54        CAD, CABG    Clotting Disorder Sister         Von Willebrand's     Social History     Tobacco Use    Smoking status: Never    Smokeless tobacco: Never   Vaping Use    Vaping Use: Never used   Substance Use Topics    Alcohol use: Yes     Alcohol/week: 3.0 standard drinks     Types: 3 Cans of beer per week     Comment: rare    Drug use: No     No Known Allergies  Current Outpatient Medications   Medication Sig Dispense Refill    lisinopril (PRINIVIL;ZESTRIL) 40 MG tablet TAKE 1 TABLET BY MOUTH EVERY DAY 90 tablet 1    atorvastatin (LIPITOR) 80 MG tablet TAKE 1 TABLET BY MOUTH EVERY DAY 90 tablet 1    amLODIPine (NORVASC) 10 MG tablet TAKE 1 TABLET BY MOUTH EVERY DAY 90 tablet 1    escitalopram (LEXAPRO) 10 MG tablet Take 1 tablet by mouth      metoprolol succinate (TOPROL XL) 100 MG extended release tablet TAKE 1 TABLET BY MOUTH EVERY DAY 90 tablet 2    aspirin 81 MG tablet Take 81 mg by mouth daily.        No current facility-administered medications for this visit. Review of Systems:  Constitutional: No unanticipated weight loss. There's been no change in energy level, sleep pattern, or activity level. No fevers, chills. Eyes: No visual changes or diplopia. No scleral icterus. ENT: No Headaches, hearing loss or vertigo. No mouth sores or sore throat. Cardiovascular: as reviewed in HPI  Respiratory: No cough or wheezing, no sputum production. No hemoptysis. Gastrointestinal: No abdominal pain, appetite loss, blood in stools. No change in bowel or bladder habits. Genitourinary: No dysuria, trouble voiding, or hematuria. Musculoskeletal:  No gait disturbance, no joint complaints. Integumentary: No rash or pruritis. Neurological: No headache, diplopia, change in muscle strength, numbness or tingling. Psychiatric: No anxiety or depression. Endocrine: No temperature intolerance. No excessive thirst, fluid intake, or urination. No tremor. Hematologic/Lymphatic: No abnormal bruising or bleeding, blood clots or swollen lymph nodes. Allergic/Immunologic: No nasal congestion or hives. Physical Exam:   /60   Pulse 66   Ht 5' 9\" (1.753 m)   Wt 179 lb (81.2 kg)   SpO2 98%   BMI 26.43 kg/m²   Wt Readings from Last 3 Encounters:   12/07/22 179 lb (81.2 kg)   10/28/22 178 lb (80.7 kg)   10/11/22 160 lb (72.6 kg)     Constitutional: He is oriented to person, place, and time. He appears well-developed and well-nourished. In no acute distress. Head: Normocephalic and atraumatic. Pupils equal and round. Neck: Neck supple. No JVP or carotid bruit appreciated. No mass and no thyromegaly present. No lymphadenopathy present. Cardiovascular: Normal rate, normal heart sounds and intact distal pulses. Exam reveals no gallop and no friction rub. III/VI diastolic murmur heard. Pulmonary/Chest: Effort normal and breath sounds normal. No respiratory distress. He has no wheezes, rhonchi or rales. Well healed median sternotomy scar.   Abdominal: Soft, non-tender. Bowel sounds are normal. He exhibits no organomegaly, mass or bruit. Extremities: No edema. No cyanosis or clubbing. Pulses are 2+ radial/dorsalis pedis and posterior tibial bilaterally. S/p R-TKR. Neurological: He is alert and oriented to person, place, and time. No gross cranial nerve deficit. Coordination normal.   Skin: Skin is warm and dry. There is no rash or diaphoresis. Psychiatric: He has a normal mood and affect. His speech is normal and behavior is normal.     Lab Review:   Lab Results   Component Value Date/Time    TRIG 61 10/28/2022 10:32 AM    HDL 61 10/28/2022 10:32 AM    LDLCALC 63 10/28/2022 10:32 AM    LABVLDL 12 10/28/2022 10:32 AM     Lab Results   Component Value Date/Time    BUN 12 10/28/2022 10:32 AM    CREATININE 0.9 10/28/2022 10:32 AM     EKG Interpretation: 5/16/14 Sinus rhythm. LVH with QRS widening. 10/11/18 Sinus  Bradycardia. Voltage criteria for LVH. IVCD  10/17/19 Sinus rhythm. Voltage criteria for LVH. IVCD. 10/26/21 Sinus rhythm. Voltage criteria for LVH. Nonspecific ST abnormality and QRS widening. 10/11/22: Sinus rhythm with ectopic ventricular beat, LVH with QRS widening. ~64 bpm     Image Review:   Lexiscan: 6/2014  Abnormal study with a reversible defect of the apical anterior wall and apex consistent with ischemia. There is also a fixed defect of the basal to mid inferior wall. 2) The LV is severely dilated with moderately reduced systolic function (EF 01%). There is moderate diffuse hypokinesis and severe hypokinesis of the apex. 3) The ECG portion of the stress test is abnormal with significant ST depression which occurs late into recovery. 4) Uncontrolled hypertension. Echo 5/20/14  Normal left ventricle size. Concentric left ventricular hypertrophy. Normal systolic function with an estimated ejection fraction of 50-55%. No regional wall motion abnormalities are seen. Diastolic dysfunction is present.   Trivial mitral regurgitation is present. Mild aortic regurgitation. Trivial tricuspid regurgitation. Cath: 5/29/14   1. The left main trifurcates into a left anterior descending, a ramus intermedius and a left circumflex artery. The left main was angiographically free of significant disease. 2. The left anterior descending is a normal caliber vessel that follows a normal anatomical course. There was a complex bifurcating lesion in the proximal segment that involved the first diagonal branch. Again in this proximal segment at the level of the diagonal branch there was a 90% stenosis as well as a 90% stenosis immediately after the diagonal branch. The diagonal branch itself is a large bifurcating vessel; the upper limb of the vessel appears angiographically free of significant disease. The lower pole   of this bifurcating left anterior descending has sequential 95% stenoses. The remainder of the mid LAD and distal LAD have only minimal disease but no focal obstructive defects. 3. The ramus intermedius is a large caliber vessel. There is a 30% to 40% stenosis in the proximal segment of this vessel. 4. The left circumflex artery is a moderate size vessel. There is 1 small-to-moderate size obtuse marginal branch. The left circumflex and obtuse marginal have mild luminal irregularities but no significant obstructive defects. 5. The right coronary artery is a large dominant vessel supplying the PDA. The right coronary artery has a focal 95% stenosis in the proximal segment. CABG: LIMA->LAD, SVG->D2->D1, SVG->distal RCA. Echo 10/27/14  Extremely poor quality images. Definity contrast used to visualize left ventricle. Normal left ventricular size and systolic function. LV ejection fraction is visually estimated to be 50%. Normal right ventricular size and function. The valvular structures are not well seen but there are no significant valvular abnormalities by Doppler. No intracardiac source for embolus identified.     Echo 7/11/2017  Left ventricle size is normal. Mild concentric left ventricular hypertrophy is present. Global ejection fraction is mildly decreased and estimated at approximately 45 to 50%. No regional wall motion abnormalities are noted. Normal diastolic function. The left atrium is mildly dilated. The aortic valve is mildly thickened, opens well with normal gradients. Moderate aortic regurgitation is present. There is mild tricuspid regurgitation with RVSP estimated at 33 mmHg. Estimated right atrial pressure is 5 mmHg. Echo 11/1/19  Concentric LVH with normal LV size and wall motion. EF is  50-55%. The left atrium is dilated. Mild Mitral, Aortic and Tricuspid Regurgitation. Normal right ventricular size and function. Doppler derived Pulmonary artery systolic pressure (PASP) is  34 mmHg . Echo: 11/2021  Moderately dilated LV size, wall thickness and motion. EF is  55%. Mild mitral regurgitation. The left atrium is severely dilated. Moderate aortic regurgitation. Mildly dilated right ventricle. Right ventricular systolic function is  normal.  Mild tricuspid regurgitation. Estimated pulmonary artery systolic pressure is normal at 31 mmHg assuming a right atrial pressure of 3 mmHg. Echo 10/13/22   Markedly dilated LV with normal wall motion; EF 50%. Indeterminate diastolic function. Mild to moderate mitral regurgitation. Severe left atrial dilation. The right ventricle is normal in size and function. Mild to moderate dilation of the aortic root. Mild thickening of the aortic valve leaflets. Moderate to severe aortic insufficiency. AI jet is eccentric. Mild tricuspid regurgitation. Systolic pulmonary artery pressure (SPAP) estimated at 47 mmHg (estimated RA pressure of 8 mmHg included). Cardiac MRI 11/30/22  1. Moderate to severe aortic regurgitation. Regurgitant volume in the severe range with regurgitant fraction of the moderate range.   2. Markedly dilated left ventricle with mild global hypokinesis and ejection fraction calculated at 48%. 3. Right ventricular enlargement. 4. Mild mitral regurgitation. 5. Mild chest regurgitation. 6. Mild biatrial enlargement. Assessment/Plan:   1) Severe aortic insufficiency. MRI was consistent with severe aortic insufficiency and LVEF now <50% with severely dilated LV. Intervention is likely necessary upon the aortic valve and discussed options. With his past sternotomy, we discussed if he would be willing to travel to be enrolled in a trial for SHIRLEY for AI. Patient and son is agreeable. I will help arrange consultation in [facility]. Will reduce the Toprol XL to 50 mg daily. 2) CAD s/p CABG. Seemingly asymptomatic. Continue with medical management and risk factor modification including aspirin, statin, and Toprol XL 50 mg daily. 3) Essential hypertension. Controlled. Goal BP <130/80. Continue current medical therapy. 4) Hyperlipidemia. LDL 57. Continue Lipitor 80 mg daily. 5) Von Willebrand disease. Continues tolerating ASA 81mg daily. 6) History of CVA. Continue ASA and statin. Follow up after evaluation in [facility]     Thank you very much for allowing me to participate in the care of your patient. Please do not hesitate to contact me if you have any questions. Sincerely,  Levell Peer. Lefty Aggarwal, [address] [place] [address]  Ph: [phone]  Fax: [phone]    This note was scribed in the presence of Dr Lefty Aggarwal MD by Stacey Nelson, XAVI. Physician Attestation: The scribes documentation has been prepared under my direction and personally reviewed by me in its entirety. I confirm that the note above accurately reflects all work, treatment, procedures, and medical decision making performed by me.    All portions of the note including but not limited to the chief complaint, history of present illness, physical exam, assessment and plan/medical decision making were personally reviewed, edited, and updated on the day of the visit.

## 2022-12-13 ENCOUNTER — TELEPHONE (OUTPATIENT)
Dept: CARDIOLOGY CLINIC | Age: 72
End: 2022-12-13

## 2022-12-13 NOTE — TELEPHONE ENCOUNTER
Ranjan Sethi called in wanting to know if Dr. Glenis Wharton reached out to the surgeon in [facility] for the trial surgery. \"in a trial for SHIRLEY for AI. Patient and son is agreeable. I will help arrange consultation in [facility]. \" -Dr. Glenis Wharton on 12/7.     Please contact Eusebio at: [phone] with information

## 2022-12-14 NOTE — TELEPHONE ENCOUNTER
Monserrat RN with Dr. Constance Salinas from [facility] calling for demographics, records and CDs on patient. Fax [phone] phone [phone].

## 2022-12-14 NOTE — TELEPHONE ENCOUNTER
Called and spoke with Monserrat, will need echo and demographics. She will link his account and will be able to see his chart through [facility]. Called echo and Sue Gtz states she will push through the echo. Faxed information and Monserrat states she will contact the patient/son.

## 2022-12-14 NOTE — TELEPHONE ENCOUNTER
West allis From [facility] called into the office regarding the referral for Noble Dennis, she asked to speak with the nurse for Dr. Walt Boogie.  I transferred to Viky Kaur, [address] [place] [address] Vivienne Parks.

## 2022-12-15 ENCOUNTER — TELEPHONE (OUTPATIENT)
Dept: CARDIOLOGY CLINIC | Age: 72
End: 2022-12-15

## 2022-12-15 NOTE — TELEPHONE ENCOUNTER
Jodine Boston called in today from [address] [place] [address] she stated that she needs a disc of the echo that the imaging that was sent was unable to be read   She needs it sent to :    [address] [place] [address], [place] [address]

## 2022-12-16 NOTE — TELEPHONE ENCOUNTER
Called and spoke with Donella Cheadle, she states their disc burner is broken. Unable to obtain disc through the echo department. Arielle Chun in the cath he states he will try, but uncertain if it will work. The disc an be picked up at the cath lab . Called and updated Monserrat HURLEY. Patient is scheduled to be seen 12/29/22.

## 2022-12-29 ENCOUNTER — TELEPHONE (OUTPATIENT)
Dept: CARDIOLOGY CLINIC | Age: 72
End: 2022-12-29

## 2022-12-29 DIAGNOSIS — I35.1 SEVERE AORTIC INSUFFICIENCY: Primary | ICD-10-CM

## 2022-12-29 NOTE — TELEPHONE ENCOUNTER
Received call from patient and informed that [facility] will be scheduled with Dr. Liberty Barthel within the next 1 week. Patient is agreeable and asked that further correspondence be with his son Marilu Lanza at [phone]. Marilu Lanza notified that [facility] will be calling tomorrow to schedule procedure and verbalized understanding. Rosemary, please call ASAP to schedule C with Dr. Noemy Sy, thanks. The morning of your procedure you will park in the hospital parking lot and report directly to the cath lab to check in.     Pre-Procedure Instructions   You will need to fast for at least 8 hours prior to procedure. No caffeine, gum or mints the morning of procedure. All other medications can be taken in the morning with sips of water. You will need to take 325 mg aspirin the morning of. If you are currently taking 81 mg please take 4 tablets that morning. Do not use any lotions, creams or perfume the morning of procedure. Pre-procedure lab work will need to be completed 5-7 days prior to procedure. Please have a responsible adult to drive you home after procedure. We advise you have someone stay with you for the first 24 hours for precautionary measures. Depending on your procedure you may require an overnight stay. Cath lab will provide you with all post procedure instructions. If you have any questions regarding the procedure itself or medications, please call [phone] and ask to speak with a nurse.

## 2022-12-29 NOTE — TELEPHONE ENCOUNTER
Patient enrolled in trial for SHIRLEY and will need a LHC. LVM requesting patient to call the office.

## 2022-12-30 DIAGNOSIS — I35.1 SEVERE AORTIC INSUFFICIENCY: ICD-10-CM

## 2022-12-30 LAB
ANION GAP SERPL CALCULATED.3IONS-SCNC: 12 MMOL/L (ref 3–16)
BASOPHILS ABSOLUTE: 0 K/UL (ref 0–0.2)
BASOPHILS RELATIVE PERCENT: 0.5 %
BUN BLDV-MCNC: 14 MG/DL (ref 7–20)
CALCIUM SERPL-MCNC: 9.4 MG/DL (ref 8.3–10.6)
CHLORIDE BLD-SCNC: 104 MMOL/L (ref 99–110)
CO2: 25 MMOL/L (ref 21–32)
CREAT SERPL-MCNC: 0.8 MG/DL (ref 0.8–1.3)
EOSINOPHILS ABSOLUTE: 0.2 K/UL (ref 0–0.6)
EOSINOPHILS RELATIVE PERCENT: 2.3 %
GFR SERPL CREATININE-BSD FRML MDRD: >60 ML/MIN/{1.73_M2}
GLUCOSE BLD-MCNC: 79 MG/DL (ref 70–99)
HCT VFR BLD CALC: 43.8 % (ref 40.5–52.5)
HEMOGLOBIN: 14.8 G/DL (ref 13.5–17.5)
LYMPHOCYTES ABSOLUTE: 1.5 K/UL (ref 1–5.1)
LYMPHOCYTES RELATIVE PERCENT: 22.4 %
MCH RBC QN AUTO: 31.6 PG (ref 26–34)
MCHC RBC AUTO-ENTMCNC: 33.9 G/DL (ref 31–36)
MCV RBC AUTO: 93.3 FL (ref 80–100)
MONOCYTES ABSOLUTE: 0.5 K/UL (ref 0–1.3)
MONOCYTES RELATIVE PERCENT: 7.8 %
NEUTROPHILS ABSOLUTE: 4.6 K/UL (ref 1.7–7.7)
NEUTROPHILS RELATIVE PERCENT: 67 %
PDW BLD-RTO: 13.9 % (ref 12.4–15.4)
PLATELET # BLD: 175 K/UL (ref 135–450)
PMV BLD AUTO: 8.2 FL (ref 5–10.5)
POTASSIUM SERPL-SCNC: 4.4 MMOL/L (ref 3.5–5.1)
RBC # BLD: 4.7 M/UL (ref 4.2–5.9)
SODIUM BLD-SCNC: 141 MMOL/L (ref 136–145)
WBC # BLD: 6.9 K/UL (ref 4–11)

## 2022-12-30 NOTE — TELEPHONE ENCOUNTER
Spoke with the son (Ernesto) and got the pt scheduled for his procedure. We went over instructions below and he verbalized understanding.     Procedure- [facility]   Date: 1/3/2023  Arrival Time: 7:30 am   Procedure time: 9:00 am     The morning of your procedure you will park in the hospital parking lot and report directly to the cath lab to check in.      Pre-Procedure Instructions   You will need to fast for at least 8 hours prior to procedure. No caffeine, gum or mints the morning of procedure.  All other medications can be taken in the morning with sips of water.   You will need to take 325 mg aspirin the morning of.  If you are currently taking 81 mg please take 4 tablets that morning.   Do not use any lotions, creams or perfume the morning of procedure.   Pre-procedure lab work will need to be completed 5-7 days prior to procedure.   Please have a responsible adult to drive you home after procedure. We advise you have someone stay with you for the first 24 hours for precautionary measures. Depending on your procedure you may require an overnight stay.   Cath lab will provide you with all post procedure instructions.      If you have any questions regarding the procedure itself or medications, please call [phone] and ask to speak with a nurse.     Teams update / emailed cath lab

## 2023-01-03 ENCOUNTER — HOSPITAL ENCOUNTER (OUTPATIENT)
Dept: CARDIAC CATH/INVASIVE PROCEDURES | Age: 73
Discharge: HOME | End: 2023-01-03
Payer: MEDICARE

## 2023-01-03 VITALS
DIASTOLIC BLOOD PRESSURE: 80 MMHG | RESPIRATION RATE: 18 BRPM | HEIGHT: 69 IN | HEART RATE: 95 BPM | SYSTOLIC BLOOD PRESSURE: 188 MMHG | TEMPERATURE: 97.2 F | OXYGEN SATURATION: 99 % | WEIGHT: 179 LBS | BODY MASS INDEX: 26.51 KG/M2

## 2023-01-03 LAB
EKG ATRIAL RATE: 75 BPM
EKG DIAGNOSIS: NORMAL
EKG P AXIS: 19 DEGREES
EKG P-R INTERVAL: 154 MS
EKG Q-T INTERVAL: 420 MS
EKG QRS DURATION: 122 MS
EKG QTC CALCULATION (BAZETT): 469 MS
EKG R AXIS: 39 DEGREES
EKG T AXIS: 71 DEGREES
EKG VENTRICULAR RATE: 75 BPM

## 2023-01-03 PROCEDURE — 93010 ELECTROCARDIOGRAM REPORT: CPT | Performed by: INTERNAL MEDICINE

## 2023-01-03 PROCEDURE — 93459 L HRT ART/GRFT ANGIO: CPT | Performed by: INTERNAL MEDICINE

## 2023-01-03 PROCEDURE — 99153 MOD SED SAME PHYS/QHP EA: CPT

## 2023-01-03 PROCEDURE — 6360000002 HC RX W HCPCS

## 2023-01-03 PROCEDURE — 6360000004 HC RX CONTRAST MEDICATION: Performed by: INTERNAL MEDICINE

## 2023-01-03 PROCEDURE — 99152 MOD SED SAME PHYS/QHP 5/>YRS: CPT | Performed by: INTERNAL MEDICINE

## 2023-01-03 PROCEDURE — C1769 GUIDE WIRE: HCPCS

## 2023-01-03 PROCEDURE — C1894 INTRO/SHEATH, NON-LASER: HCPCS

## 2023-01-03 PROCEDURE — 93005 ELECTROCARDIOGRAM TRACING: CPT | Performed by: INTERNAL MEDICINE

## 2023-01-03 PROCEDURE — 2500000003 HC RX 250 WO HCPCS

## 2023-01-03 PROCEDURE — 99152 MOD SED SAME PHYS/QHP 5/>YRS: CPT

## 2023-01-03 PROCEDURE — 93459 L HRT ART/GRFT ANGIO: CPT

## 2023-01-03 PROCEDURE — 2709999900 HC NON-CHARGEABLE SUPPLY

## 2023-01-03 RX ORDER — ACETAMINOPHEN 325 MG/1
650 TABLET ORAL EVERY 4 HOURS PRN
Status: DISCONTINUED | OUTPATIENT
Start: 2023-01-03 | End: 2023-01-04 | Stop reason: HOSPADM

## 2023-01-03 RX ORDER — SODIUM CHLORIDE 0.9 % (FLUSH) 0.9 %
5-40 SYRINGE (ML) INJECTION EVERY 12 HOURS SCHEDULED
Status: DISCONTINUED | OUTPATIENT
Start: 2023-01-03 | End: 2023-01-04 | Stop reason: HOSPADM

## 2023-01-03 RX ORDER — SODIUM CHLORIDE 9 MG/ML
INJECTION, SOLUTION INTRAVENOUS PRN
Status: DISCONTINUED | OUTPATIENT
Start: 2023-01-03 | End: 2023-01-04 | Stop reason: HOSPADM

## 2023-01-03 RX ORDER — SODIUM CHLORIDE 0.9 % (FLUSH) 0.9 %
5-40 SYRINGE (ML) INJECTION PRN
Status: DISCONTINUED | OUTPATIENT
Start: 2023-01-03 | End: 2023-01-04 | Stop reason: HOSPADM

## 2023-01-03 RX ADMIN — IOPAMIDOL 220 ML: 755 INJECTION, SOLUTION INTRAVENOUS at 10:26

## 2023-01-03 NOTE — PROCEDURES
[address] [place] [address]                            CARDIAC CATHETERIZATION    PATIENT NAME: Radha Paniagua               :        1950  MED REC NO:   [id number]                          ROOM:  ACCOUNT NO:   [de-identified]                           ADMIT DATE: 2023  PROVIDER:     Christ Haskins MD    DATE OF PROCEDURE:  2023    INDICATIONS FOR PROCEDURE:  Aortic insufficiency. OPERATIVE PROCEDURE:  The risks and benefits of the procedure were  explained to the patient. Informed consent was obtained. The patient  was placed on the cardiac catheterization table and prepped and draped  in sterile fashion. He had received IV desmopressin 30 minutes prior to  the case. After the administration of anesthesia, the left radial artery was  accessed and cannulated and a 5-Scottish sheath inserted using Seldinger  technique. The pre-cocktail of heparin, verapamil and nitroglycerin was  given through the sheath port. Over the 0.035 J-wire, the JL4.5 5-Scottish diagnostic catheter was  advanced to the ostium of the left main coronary artery and coronary  angiography was performed to this system. Catheter was removed over the  wire. Next, the JR4 catheter was advanced to the ostium of the right  coronary artery and coronary angiography was performed of the right  coronary artery. Catheter was pulled up to engage the ostium of the  saphenous vein graft to diagonal 1 and diagonal 2. Efforts to engage  the ostium of the saphenous vein graft to the right were unsuccessful  with this catheter. Multiple catheters were tried including a  multipurpose 3DRC and KRYSTA catheters. These were all unsuccessful. There was competitive flow noted in the distal right coronary artery and  some of the graft was able to be visualized and patent via nonselective  injections.     The pigtail catheter was then advanced over the wire into the left  ventricle. Left ventricular end-diastolic pressure measurements were  obtained and then a power injection left ventriculogram was performed. Catheter was pulled back across the aortic valve to assess for gradient  and a root aortogram was performed. This catheter was removed over the  wire. Lastly, the KRYSTA catheter was advanced into the left subclavian  over the J-wire and engaged to the ostium of the left internal mammary  artery graft down to its anastomosis with the left anterior descending  artery. Angiography was performed. Catheter was removed over the wire. The post-cocktail of verapamil and nitroglycerin was given through the  sheath port. The right radial sheath was removed and a Terumo pressure  band was applied for nonocclusive hemostasis. The patient had an ASA  grade III and a Mallampati score of II. Moderate sedation was  administered by an independent agent at my direction and supervision. 2  mg of IV Versed and 100 mcg of fentanyl was given for the procedure. FINDINGS:  1. Right-dominant coronary arterial circulation. There is 99% proximal  RCA lesion. There is competitive flow in the distal RCA from a patent  vein graft _____ was not able to be selectively injected, but did appear  patent with no flow limitations from nonselective injections. The left  system has no left main disease. The circumflex is very small and just  only runs in the AV groove. There is a large ramus intermedius branch  with 40% ostial disease. Left anterior descending artery has a 99%  proximal to mid lesion. The distal LAD fills from a patent left  internal mammary artery graft. 2.  Low-normal left ventricular systolic function. LV ejection fraction  of 50%. 3.  Low left ventricular end-diastolic pressure at 10 mmHg. 4.  No gradient across the aortic valve on pullback to suggest aortic  stenosis. Known severe aortic insufficiency. 5.  Mild mitral regurgitation.   6. Mild-to-moderate left atrial enlargement.         Cheli Hawk MD    D: 01/03/2023 10:37:56       T: 01/03/2023 10:42:54     [email or web address]  Job#: [id number]     Doc#: [id number]    CC:

## 2023-01-03 NOTE — ANESTHESIA PRE-OP
Brief Pre-Op Note/Sedation Assessment      Pamela Monreal  1950  [id number]  8:14 AM    Planned Procedure: Cardiac Catheterization Procedure  Post Procedure Plan: Return to same level of care  Consent: I have discussed with the patient and/or the patient representative the indication, alternatives, and the possible risks and/or complications of the planned procedure and the anesthesia methods. The patient and/or patient representative appear to understand and agree to proceed. Chief Complaint:   Dyspnea on Exertion      Indications for Cath Procedure:  Presentation:  Valvular Disease - Aortic Regurgitation; Regurgitation Severity: Severe (4+)  2. Anginal Classification within 2 weeks:  CCS III - Symptoms with everyday living activities, i.e., moderate limitation  3. Angina Symptoms Assessment:  Typical Chest Pain  4. Heart Failure Class within last 2 weeks:  Yes:  Heart Failure Type: Diastolic Severity:  Class II - Symptoms of HF on ordinary exertion  5. Cardiovascular Instability:  No    Prior Ischemic Workup/Eval:  Pre-Procedural Medications: Yes: Aspirin, Beta Blockers, and STATIN  2. Stress Test Completed? No    Does Patient need surgery? Cath Valve Surgery:  Yes:  Aortic Regurgitation; Regurgitation Severity: Severe (4+) High Risk: Non-Vascular >= 4 METS with symptoms    Pre-Procedure Medical History:  Vital Signs: There were no vitals taken for this visit.     Allergies:  No Known Allergies  Medications:    Current Outpatient Medications   Medication Sig Dispense Refill    metoprolol succinate (TOPROL XL) 50 MG extended release tablet Take 1 tablet by mouth daily 30 tablet 4    lisinopril (PRINIVIL;ZESTRIL) 40 MG tablet TAKE 1 TABLET BY MOUTH EVERY DAY 90 tablet 1    atorvastatin (LIPITOR) 80 MG tablet TAKE 1 TABLET BY MOUTH EVERY DAY 90 tablet 1    amLODIPine (NORVASC) 10 MG tablet TAKE 1 TABLET BY MOUTH EVERY DAY 90 tablet 1    escitalopram (LEXAPRO) 10 MG tablet Take 1 tablet by mouth      aspirin 81 MG tablet Take 81 mg by mouth daily. No current facility-administered medications for this encounter. Past Medical History:    Past Medical History:   Diagnosis Date    Acute ischemic stroke ([facility] [address] 10/24/2014    Right    Anemia     Arthritis     rt knee    Bilateral sensorineural hearing loss 5/9/2019    CAD (coronary artery disease) 05/29/2014    LAD, RCA, Diag - cariothoracic surgery    Cataracts, bilateral 07/2020    Colon polyps 02/03/2020    Tubular adenoma, Dr. Shreyas Gonzales (hard of hearing)     hermes-lateral hearing aids    HTN (hypertension)     Hyperlipidemia     Hypokalemia     Jaw dislocation     Von Willebrand disease     Walking pneumonia        Surgical History:    Past Surgical History:   Procedure Laterality Date    COLONOSCOPY  02/03/2020    Dr. Blas Camacho N/A 2/3/2020    COLONOSCOPY POLYPECTOMY SNARE/COLD BIOPSY performed by Francisco Smith MD at [facility]      2015    HERNIA REPAIR Right 6/10/2022    RIGHT INGUINAL HERNIA REPAIR WITH MESH performed by Charla Amin MD at [address] [facility] Right 7/31/2020    Phacoemulsification with intraocular lens implant performed by Puja Brown MD at [address] Left 8/6/2020    Phacoemulsification with intraocular lens implant performed by Puja Brown MD at [address] [facility] Right 10/21/14    right total knee replacement    TONSILLECTOMY AND ADENOIDECTOMY  1955             Pre-Sedation:  Pre-Sedation Documentation and Exam:  I have personally completed a history, physical exam & review of systems for this patient (see notes). Prior History of Anesthesia Complications:   none    Modified Mallampati:  II (soft palate, uvula, fauces visible)    ASA Classification:  Class 2 - A normal healthy patient with mild systemic disease    Arthur Scale:   Activity: 2 - Able to move 4 extremities voluntarily on command  Respiration:  2 - Able to breathe deeply and cough freely  Circulation:  2 - BP+/- 20mmHg of normal  Consciousness:  2 - Fully awake  Oxygen Saturation (color):  2 - Able to maintain oxygen saturation >92% on room air    Sedation/Anesthesia Plan:  Guard the patient's safety and welfare. Minimize physical discomfort and pain. Minimize negative psychological responses to treatment by providing sedation and analgesia and maximize the potential amnesia. Patient to meet pre-procedure discharge plan.     Medication Planned:  midazolam intravenously and fentanyl intravenously    Patient is an appropriate candidate for plan of sedation:   yes      Electronically signed by Ankit Cazares MD on 1/3/2023 at 8:14 AM

## 2023-01-03 NOTE — H&P
[facility]                                                  Cardiology Progress Note     Eileen Garcia  1950 December 7, 2022     Primary Physician: Dr. Ivelisse Tabor  Reason for Visit: CAD s/p CABG, AI      CC: \"I'm feeling okay. \"      HPI:  The patient is 67 y.o. male with Dora Lo disease, CAD s/p CABG (2014), aortic insufficiency, hypertension, and hyperlipidemia who presents for management of CAD and AI. He originally presented with typical exertional angina chest pain. Angiogram showed multivessel disease and the patient had bypass surgery. His post-operative course was uncomplicated. Later, he had a total right knee replacement which was complicated by a small CVA (2014). He denied any residual deficit from the CVA. He was followed chronically in office but more recently had increasing intensity/loudness of his diastolic murmur. His repeat echocardiogram showed an EF at 50% with moderate to severe AI. His cardiac MRI findings were consistent with severe aortic insufficiency with an EF of 48% and severely dilated LV. Today, he is accompanied by his son. He denies any new or recurrent cardiac sounding complaints. He continues to remain active without any exertional symptoms. Patient denies exertional chest pain/pressure, dyspnea at rest, worsening KING, PND, orthopnea, palpitations, lightheadedness, weight changes, changes in LE edema, and syncope. Patient reports compliance to his medications.           Past Medical History:   Diagnosis Date    Acute ischemic stroke ([facility] [address] 10/24/2014     Right    Anemia      Arthritis       rt knee    Bilateral sensorineural hearing loss 5/9/2019    CAD (coronary artery disease) 05/29/2014     LAD, RCA, Diag - cariothoracic surgery    Cataracts, bilateral 07/2020    Colon polyps 02/03/2020     Tubular adenoma, Dr. Luci Cadet (hard of hearing)       hermes-lateral hearing aids    HTN (hypertension)      Hyperlipidemia      Hypokalemia      Jaw dislocation      Von Willebrand disease      Walking pneumonia              Past Surgical History:   Procedure Laterality Date    COLONOSCOPY   02/03/2020     Dr. Oumou Arevalo N/A 2/3/2020     COLONOSCOPY POLYPECTOMY SNARE/COLD BIOPSY performed by Dain Stevens MD at [facility]         2015    HERNIA REPAIR Right 6/10/2022     RIGHT INGUINAL HERNIA REPAIR WITH MESH performed by Shiva Watson MD at [address] [facility] Right 7/31/2020     Phacoemulsification with intraocular lens implant performed by Kacie Gracia MD at [address] Left 8/6/2020     Phacoemulsification with intraocular lens implant performed by Kacie Gracia MD at [address] [facility] Right 10/21/14     right total knee replacement    TONSILLECTOMY AND ADENOIDECTOMY   1955            Family History   Problem Relation Age of Onset    Other Mother 35         blood clot    High Blood Pressure Father      Heart Disease Father 54         CAD, CABG    Clotting Disorder Sister           Von Willebrand's      Social History            Tobacco Use    Smoking status: Never    Smokeless tobacco: Never   Vaping Use    Vaping Use: Never used   Substance Use Topics    Alcohol use:  Yes       Alcohol/week: 3.0 standard drinks       Types: 3 Cans of beer per week       Comment: rare    Drug use: No      No Known Allergies         Current Outpatient Medications Medication Sig Dispense Refill    lisinopril (PRINIVIL;ZESTRIL) 40 MG tablet TAKE 1 TABLET BY MOUTH EVERY DAY 90 tablet 1    atorvastatin (LIPITOR) 80 MG tablet TAKE 1 TABLET BY MOUTH EVERY DAY 90 tablet 1    amLODIPine (NORVASC) 10 MG tablet TAKE 1 TABLET BY MOUTH EVERY DAY 90 tablet 1    escitalopram (LEXAPRO) 10 MG tablet Take 1 tablet by mouth        metoprolol succinate (TOPROL XL) 100 MG extended release tablet TAKE 1 TABLET BY MOUTH EVERY DAY 90 tablet 2    aspirin 81 MG tablet Take 81 mg by mouth daily. No current facility-administered medications for this visit. Review of Systems:  Constitutional: No unanticipated weight loss. There's been no change in energy level, sleep pattern, or activity level. No fevers, chills. Eyes: No visual changes or diplopia. No scleral icterus. ENT: No Headaches, hearing loss or vertigo. No mouth sores or sore throat. Cardiovascular: as reviewed in HPI  Respiratory: No cough or wheezing, no sputum production. No hemoptysis. Gastrointestinal: No abdominal pain, appetite loss, blood in stools. No change in bowel or bladder habits. Genitourinary: No dysuria, trouble voiding, or hematuria. Musculoskeletal:  No gait disturbance, no joint complaints. Integumentary: No rash or pruritis. Neurological: No headache, diplopia, change in muscle strength, numbness or tingling. Psychiatric: No anxiety or depression. Endocrine: No temperature intolerance. No excessive thirst, fluid intake, or urination. No tremor. Hematologic/Lymphatic: No abnormal bruising or bleeding, blood clots or swollen lymph nodes. Allergic/Immunologic: No nasal congestion or hives. Physical Exam:   /60   Pulse 66   Ht 5' 9\" (1.753 m)   Wt 179 lb (81.2 kg)   SpO2 98%   BMI 26.43 kg/m²       Wt Readings from Last 3 Encounters:   12/07/22 179 lb (81.2 kg)   10/28/22 178 lb (80.7 kg)   10/11/22 160 lb (72.6 kg)      Constitutional: He is oriented to person, place, and time. He appears well-developed and well-nourished. In no acute distress. Head: Normocephalic and atraumatic. Pupils equal and round. Neck: Neck supple. No JVP or carotid bruit appreciated. No mass and no thyromegaly present. No lymphadenopathy present. Cardiovascular: Normal rate, normal heart sounds and intact distal pulses. Exam reveals no gallop and no friction rub. III/VI diastolic murmur heard. Pulmonary/Chest: Effort normal and breath sounds normal. No respiratory distress. He has no wheezes, rhonchi or rales. Well healed median sternotomy scar. Abdominal: Soft, non-tender. Bowel sounds are normal. He exhibits no organomegaly, mass or bruit. Extremities: No edema. No cyanosis or clubbing. Pulses are 2+ radial/dorsalis pedis and posterior tibial bilaterally. S/p R-TKR. Neurological: He is alert and oriented to person, place, and time. No gross cranial nerve deficit. Coordination normal.   Skin: Skin is warm and dry. There is no rash or diaphoresis. Psychiatric: He has a normal mood and affect. His speech is normal and behavior is normal.      Lab Review:         Lab Results   Component Value Date/Time     TRIG 61 10/28/2022 10:32 AM     HDL 61 10/28/2022 10:32 AM     LDLCALC 63 10/28/2022 10:32 AM     LABVLDL 12 10/28/2022 10:32 AM            Lab Results   Component Value Date/Time     BUN 12 10/28/2022 10:32 AM     CREATININE 0.9 10/28/2022 10:32 AM      EKG Interpretation: 5/16/14 Sinus rhythm. LVH with QRS widening. 10/11/18 Sinus  Bradycardia. Voltage criteria for LVH. IVCD  10/17/19 Sinus rhythm. Voltage criteria for LVH. IVCD. 10/26/21 Sinus rhythm. Voltage criteria for LVH. Nonspecific ST abnormality and QRS widening. 10/11/22: Sinus rhythm with ectopic ventricular beat, LVH with QRS widening. ~64 bpm      Image Review:   Lexiscan: 6/2014  Abnormal study with a reversible defect of the apical anterior wall and apex consistent with ischemia.  There is also a fixed defect of the basal to mid inferior wall. 2) The LV is severely dilated with moderately reduced systolic function (EF 98%). There is moderate diffuse hypokinesis and severe hypokinesis of the apex. 3) The ECG portion of the stress test is abnormal with significant ST depression which occurs late into recovery. 4) Uncontrolled hypertension. Echo 5/20/14  Normal left ventricle size. Concentric left ventricular hypertrophy. Normal systolic function with an estimated ejection fraction of 50-55%. No regional wall motion abnormalities are seen. Diastolic dysfunction is present. Trivial mitral regurgitation is present. Mild aortic regurgitation. Trivial tricuspid regurgitation. Cath: 5/29/14   1. The left main trifurcates into a left anterior descending, a ramus intermedius and a left circumflex artery. The left main was angiographically free of significant disease. 2. The left anterior descending is a normal caliber vessel that follows a normal anatomical course. There was a complex bifurcating lesion in the proximal segment that involved the first diagonal branch. Again in this proximal segment at the level of the diagonal branch there was a 90% stenosis as well as a 90% stenosis immediately after the diagonal branch. The diagonal branch itself is a large bifurcating vessel; the upper limb of the vessel appears angiographically free of significant disease. The lower pole   of this bifurcating left anterior descending has sequential 95% stenoses. The remainder of the mid LAD and distal LAD have only minimal disease but no focal obstructive defects. 3. The ramus intermedius is a large caliber vessel. There is a 30% to 40% stenosis in the proximal segment of this vessel. 4. The left circumflex artery is a moderate size vessel. There is 1 small-to-moderate size obtuse marginal branch. The left circumflex and obtuse marginal have mild luminal irregularities but no significant obstructive defects.    5. The right coronary artery is a large dominant vessel supplying the PDA. The right coronary artery has a focal 95% stenosis in the proximal segment. CABG: LIMA->LAD, SVG->D2->D1, SVG->distal RCA. Echo 10/27/14  Extremely poor quality images. Definity contrast used to visualize left ventricle. Normal left ventricular size and systolic function. LV ejection fraction is visually estimated to be 50%. Normal right ventricular size and function. The valvular structures are not well seen but there are no significant valvular abnormalities by Doppler. No intracardiac source for embolus identified. Echo 7/11/2017  Left ventricle size is normal. Mild concentric left ventricular hypertrophy is present. Global ejection fraction is mildly decreased and estimated at approximately 45 to 50%. No regional wall motion abnormalities are noted. Normal diastolic function. The left atrium is mildly dilated. The aortic valve is mildly thickened, opens well with normal gradients. Moderate aortic regurgitation is present. There is mild tricuspid regurgitation with RVSP estimated at 33 mmHg. Estimated right atrial pressure is 5 mmHg. Echo 11/1/19  Concentric LVH with normal LV size and wall motion. EF is  50-55%. The left atrium is dilated. Mild Mitral, Aortic and Tricuspid Regurgitation. Normal right ventricular size and function. Doppler derived Pulmonary artery systolic pressure (PASP) is  34 mmHg . Echo: 11/2021  Moderately dilated LV size, wall thickness and motion. EF is  55%. Mild mitral regurgitation. The left atrium is severely dilated. Moderate aortic regurgitation. Mildly dilated right ventricle. Right ventricular systolic function is  normal.  Mild tricuspid regurgitation. Estimated pulmonary artery systolic pressure is normal at 31 mmHg assuming a right atrial pressure of 3 mmHg. Echo 10/13/22   Markedly dilated LV with normal wall motion; EF 50%. Indeterminate diastolic function. Mild to moderate mitral regurgitation.  Severe left atrial dilation. The right ventricle is normal in size and function. Mild to moderate dilation of the aortic root. Mild thickening of the aortic valve leaflets. Moderate to severe aortic insufficiency. AI jet is eccentric. Mild tricuspid regurgitation. Systolic pulmonary artery pressure (SPAP) estimated at 47 mmHg (estimated RA pressure of 8 mmHg included). Cardiac MRI 11/30/22  1. Moderate to severe aortic regurgitation. Regurgitant volume in the severe range with regurgitant fraction of the moderate range. 2. Markedly dilated left ventricle with mild global hypokinesis and ejection fraction calculated at 48%. 3. Right ventricular enlargement. 4. Mild mitral regurgitation. 5. Mild chest regurgitation. 6. Mild biatrial enlargement. Assessment/Plan:   1) Severe aortic insufficiency. MRI was consistent with severe aortic insufficiency and LVEF now <50% with severely dilated LV. Intervention is likely necessary upon the aortic valve and discussed options. With his past sternotomy, we discussed if he would be willing to travel to be enrolled in a trial for SHIRLEY for AI. Patient and son is agreeable. I will help arrange consultation in [facility]. Will reduce the Toprol XL to 50 mg daily. 2) CAD s/p CABG. Seemingly asymptomatic. Continue with medical management and risk factor modification including aspirin, statin, and Toprol XL 50 mg daily. 3) Essential hypertension. Controlled. Goal BP <130/80. Continue current medical therapy. 4) Hyperlipidemia. LDL 57. Continue Lipitor 80 mg daily. 5) Von Willebrand disease. Continues tolerating ASA 81mg daily. 6) History of CVA. Continue ASA and statin. Follow up after evaluation in [facility]      Thank you very much for allowing me to participate in the care of your patient. Please do not hesitate to contact me if you have any questions. Sincerely,  Parvez Vallecillo MD               I have reviewed the most recent H&P for this patient and independently examined the patient. There have been no changes. We will proceed with the planned procedure.     Tucker Miller MD

## 2023-01-30 ENCOUNTER — SCHEDULED TELEPHONE ENCOUNTER (OUTPATIENT)
Dept: INTERNAL MEDICINE CLINIC | Age: 73
End: 2023-01-30
Payer: MEDICARE

## 2023-01-30 ENCOUNTER — TELEPHONE (OUTPATIENT)
Dept: INTERNAL MEDICINE CLINIC | Age: 73
End: 2023-01-30

## 2023-01-30 DIAGNOSIS — Z11.59 ENCOUNTER FOR SCREENING FOR VIRAL DISEASE: ICD-10-CM

## 2023-01-30 DIAGNOSIS — Z11.59 ENCOUNTER FOR SCREENING FOR VIRAL DISEASE: Primary | ICD-10-CM

## 2023-01-30 DIAGNOSIS — J06.9 URI WITH COUGH AND CONGESTION: Primary | ICD-10-CM

## 2023-01-30 PROBLEM — D68.00 VON WILLEBRAND'S DISEASE (HCC): Status: ACTIVE | Noted: 2023-01-30

## 2023-01-30 LAB
RAPID INFLUENZA  B AGN: NEGATIVE
RAPID INFLUENZA A AGN: NEGATIVE
SARS-COV-2, PCR: NOT DETECTED

## 2023-01-30 PROCEDURE — 99442 PR PHYS/QHP TELEPHONE EVALUATION 11-20 MIN: CPT | Performed by: INTERNAL MEDICINE

## 2023-01-30 RX ORDER — BENZONATATE 200 MG/1
200 CAPSULE ORAL 3 TIMES DAILY PRN
Qty: 30 CAPSULE | Refills: 0 | Status: SHIPPED | OUTPATIENT
Start: 2023-01-30

## 2023-01-30 NOTE — PROGRESS NOTES
Tod Powell is a 67 y.o. male evaluated via telephone on 1/30/2023 for Congestion (Productive cough, congestion, sore throat for 8-10 days. Some days are better than others. )      [facility]  Internal Medicine  Patient Encounter  Sharon Jules D.O., [facility]         Chief Complaint   Patient presents with    Congestion     Productive cough, congestion, sore throat for 8-10 days. Some days are better than others. HPI: Felicita Yin is a 68-year-old  male with history of hypertension, impaired fasting glucose, von Willebrand's disease, hyperlipidemia, severe aortic valve insufficiency, coronary artery disease who presents today complaining of runny nose and cough. Symptoms started around the 21st or 22 January with a slightly scratchy throat. After 2 days, he developed severe nighttime chills but did not take a temperature. He took Tylenol with good relief. He has not had any chills since. He denies any myalgias. He denies any nasal congestion but continues to have a runny nose. The cough is productive with thick green sputum. Cough was worse with laying down at night. He thinks that the cough is a little better today. He does report fatigue but denies shortness of breath and wheezing. His throat is better. He did not do a home COVID test.  He came to the office and tested for COVID-19 and influenza this morning. The influenza testing was negative. Documentation:  I communicated with the patient and/or health care decision maker about URI. Details of this discussion including any medical advice provided: See below        Encounter Diagnoses   Name Primary? URI with cough and congestion Yes    Encounter for screening for viral disease        PLAN:  #1  Push fluids  #2 Mucinex DM  #3 Coricidin HBP  #4 Flonase nasal spray  #5 benzonatate Perles  #6 advised patient call should symptoms persist, worsen or if new symptoms develop.   May need chest x-ray and antibiotics. Total Time: minutes: 11-20 minutes (12 minutes)    Malachi Temple was evaluated through a synchronous (real-time) audio encounter. Patient identification was verified at the start of the visit. He (or guardian if applicable) is aware that this is a billable service, which includes applicable co-pays. This visit was conducted with the patient's (and/or legal guardian's) verbal consent. He has not had a related appointment within my department in the past 7 days or scheduled within the next 24 hours. The patient was located at Home: [address] [place] [address]. The provider was located at [facility] (Appt Dept): [address] [facility],  [address] [facility].     Note: not billable if this call serves to triage the patient into an appointment for the relevant concern    [facility], DO

## 2023-01-30 NOTE — TELEPHONE ENCOUNTER
Spoke with patient, 1/22/2023 he started with a cough, sore throat, headache, congestion, and chills. He will be here around 10 to be tested for covid and flu. No prior testing. He is on for a telephone visit at 4pm.    Lab orders placed.

## 2023-01-31 ENCOUNTER — TELEPHONE (OUTPATIENT)
Dept: CARDIOLOGY CLINIC | Age: 73
End: 2023-01-31

## 2023-01-31 DIAGNOSIS — Z95.1 S/P CABG (CORONARY ARTERY BYPASS GRAFT): ICD-10-CM

## 2023-01-31 DIAGNOSIS — I25.810 CORONARY ARTERY DISEASE INVOLVING CORONARY BYPASS GRAFT OF NATIVE HEART WITHOUT ANGINA PECTORIS: ICD-10-CM

## 2023-01-31 DIAGNOSIS — I35.1 SEVERE AORTIC INSUFFICIENCY: Primary | ICD-10-CM

## 2023-01-31 DIAGNOSIS — I35.1 NONRHEUMATIC AORTIC VALVE INSUFFICIENCY: ICD-10-CM

## 2023-01-31 NOTE — TELEPHONE ENCOUNTER
Reviewed documentation, spoke with Esthela Alvarez who states patient does not meet criteria due to the size of the valve. Will review with Dr. Garey Kocher and call family with update.

## 2023-01-31 NOTE — TELEPHONE ENCOUNTER
Dr. Kiley Hoyt referred Noemy French to a clinical trial and was not eligible to participate. Len  daughter called wanting to know if Dr. Daisha Hassan contacted Dr. Kiley Hoyt in regards to the decision and would like to know how to proceed?      Maddi Hernandez can be reached at: [phone]

## 2023-02-01 NOTE — TELEPHONE ENCOUNTER
Reviewed with Dr. Duncan Russell and will refer patient to Dr. Adelfo Joshi at [facility], [phone]. Called and spoke with Jamilah, reviewed recommendations and referral placed. Information provided and instructed to call with any questions. She v/u.

## 2023-02-08 ENCOUNTER — HOSPITAL ENCOUNTER (OUTPATIENT)
Dept: INFUSION THERAPY | Age: 73
Setting detail: INFUSION SERIES
Discharge: HOME | End: 2023-02-08
Payer: MEDICARE

## 2023-02-08 VITALS
HEIGHT: 69 IN | DIASTOLIC BLOOD PRESSURE: 60 MMHG | HEART RATE: 74 BPM | RESPIRATION RATE: 16 BRPM | BODY MASS INDEX: 25.8 KG/M2 | WEIGHT: 174.16 LBS | OXYGEN SATURATION: 96 % | TEMPERATURE: 97.4 F | SYSTOLIC BLOOD PRESSURE: 147 MMHG

## 2023-02-08 DIAGNOSIS — D68.00 VON WILLEBRAND DISEASE: ICD-10-CM

## 2023-02-08 DIAGNOSIS — D68.00 VON WILLEBRAND'S DISEASE: Primary | ICD-10-CM

## 2023-02-08 PROCEDURE — 2580000003 HC RX 258: Performed by: INTERNAL MEDICINE

## 2023-02-08 PROCEDURE — 96365 THER/PROPH/DIAG IV INF INIT: CPT

## 2023-02-08 PROCEDURE — 6360000002 HC RX W HCPCS: Performed by: INTERNAL MEDICINE

## 2023-02-08 RX ORDER — SODIUM CHLORIDE 0.9 % (FLUSH) 0.9 %
5-40 SYRINGE (ML) INJECTION PRN
Status: CANCELLED
Start: 2023-02-08

## 2023-02-08 RX ORDER — SODIUM CHLORIDE 0.9 % (FLUSH) 0.9 %
5-40 SYRINGE (ML) INJECTION PRN
Status: DISCONTINUED | OUTPATIENT
Start: 2023-02-08 | End: 2023-02-08

## 2023-02-08 RX ADMIN — SODIUM CHLORIDE, PRESERVATIVE FREE 10 ML: 5 INJECTION INTRAVENOUS at 12:00

## 2023-02-08 RX ADMIN — DESMOPRESSIN ACETATE 23.72 MCG: 4 SOLUTION INTRAVENOUS at 12:45

## 2023-02-08 RX ADMIN — SODIUM CHLORIDE, PRESERVATIVE FREE 20 ML: 5 INJECTION INTRAVENOUS at 13:17

## 2023-02-08 NOTE — DISCHARGE INSTRUCTIONS
Outpatient Infusion Discharge Instructions  [address] [address] [facility]  Telephone: [phone]    NAME:  Marcial Plane:  1950  MEDICAL RECORD NUMBER:  [id number]  DATE:  2/8/23    Reason for Outpatient Infusion Visit: DDAVP prior to tooth extraction    If you develop any these symptoms please contact you Doctor    [] Nausea and/or vomiting not relieved with medication   [x] Swelling, redness, and/or bleeding at injection or IV site    [] Fever or chills  [] Rash or itching   [] Shortness of breath  [x] Please review After Visit Summary (AVS) information on  DDAVP  [] Other      Outpatient [address]: Should you experience any significant changes in your health or have questions about your care please contact the [address] at [address] 8:00 am - 4:00 pm.  If you need help outside these hours and cannot wait until we are again available, contact your Primary Care Physician or go to the hospital emergency room.        Electronically signed by Nayda Hadley RN on 2/8/2023 at 12:26 PM

## 2023-02-08 NOTE — PROGRESS NOTES
Outpatient [facility] 1978      NAME:  Bear Gibson OF BIRTH:  1950  MEDICAL RECORD NUMBER:  [id number]  Episode Date:  2/8/2023    Alert and oriented x 4. Hx of von willebrand disease. Here for DDAVP infusion due to having 2 teeth extracted today. Has received DDAVP in the past without complication. DDAVP prior to dental procedure     Is the patient experiencing any:     Fatigue:   [x]   None  []   Increase over baseline but not altering normal activities  []   Moderate of causing difficulty performing some activities  []   Severe or loss of ability to perform some activities  []   Bedridden or disabling     Dizziness or Lightheadedness:   [x]   None  []   No Interference  []   Interferes with functioning but not activities of daily living  []   Interferes with daily activies  []   Bedridden or disabling     Shortness of Breath:   [x]   None   []   Dyspneic on exertion   []   Dyspnea with normal activities  []   Dyspnea at rest     Edema: none Location of edema: nothing     Tachycardia: No     Heart Palpitations: No       Chest Pain: No      Does patient have history of:         Kidney disease: No        Hyponatremia: No        Electrolyte imbalance: No        A psychological disorder that causes unusual thirst: No        Fluid retention:  No        CHF or CAD:  No        High or low BP:  High        Urination problems: No           Is Patient aware of possible common side effects of DDAVP such as: Yes , patient has received before for procedures  Pain, redness or burning or swelling at site med was given  Flushing (warmth, redness or tingly feeling):   Headache  Nausea, stomach pain      Call physician if patient has:  Low levels of sodium in the body-Headache, confusion, hallucinations, muscle cramps, severe weakness, vomiting, loss of coordination, restless or unsteady.   Seizures  Weak or shallow breathing  Lightheadedness or feeling like you are going to pass out. IV started in left arm using 22 G angiocath. Pre-infusion labs drawn with IV start. DDAVP ordered 0.3 mcg/kg. Patient weight 79  kg so dose is 23.72  mcg in 50 ml IVPB. Patient is to receive over 30 min. Start infusion 1 hour prior to procedure so complete 30 minutes prior    DDAVP infused over 30 minutes. Patient tolerated well with no complaints. Response to treatment:  Well tolerated by patient.       Education: Verbalized understanding      Electronically signed by Patti Jimenez RN on 2/8/23 at 12:10 PM EST

## 2023-02-15 ENCOUNTER — OFFICE VISIT (OUTPATIENT)
Dept: CARDIOTHORACIC SURGERY | Age: 73
End: 2023-02-15
Payer: MEDICARE

## 2023-02-15 VITALS
OXYGEN SATURATION: 96 % | HEART RATE: 74 BPM | TEMPERATURE: 98.2 F | SYSTOLIC BLOOD PRESSURE: 154 MMHG | DIASTOLIC BLOOD PRESSURE: 56 MMHG | WEIGHT: 173.7 LBS | HEIGHT: 69 IN | BODY MASS INDEX: 25.73 KG/M2

## 2023-02-15 DIAGNOSIS — I35.1 NONRHEUMATIC AORTIC VALVE INSUFFICIENCY: Primary | ICD-10-CM

## 2023-02-15 DIAGNOSIS — I50.22 CHRONIC SYSTOLIC (CONGESTIVE) HEART FAILURE (HCC): ICD-10-CM

## 2023-02-15 PROCEDURE — 1123F ACP DISCUSS/DSCN MKR DOCD: CPT | Performed by: THORACIC SURGERY (CARDIOTHORACIC VASCULAR SURGERY)

## 2023-02-15 PROCEDURE — 3077F SYST BP >= 140 MM HG: CPT | Performed by: THORACIC SURGERY (CARDIOTHORACIC VASCULAR SURGERY)

## 2023-02-15 PROCEDURE — 3078F DIAST BP <80 MM HG: CPT | Performed by: THORACIC SURGERY (CARDIOTHORACIC VASCULAR SURGERY)

## 2023-02-15 PROCEDURE — 99205 OFFICE O/P NEW HI 60 MIN: CPT | Performed by: THORACIC SURGERY (CARDIOTHORACIC VASCULAR SURGERY)

## 2023-02-15 ASSESSMENT — ENCOUNTER SYMPTOMS
TROUBLE SWALLOWING: 0
ALLERGIC/IMMUNOLOGIC NEGATIVE: 1
EYES NEGATIVE: 1
SHORTNESS OF BREATH: 0
CHEST TIGHTNESS: 0
GASTROINTESTINAL NEGATIVE: 1

## 2023-02-15 NOTE — PROGRESS NOTES
Subjective:      Patient ID: Zelalem Tate is a 67 y.o. male. Chief Complaint   Patient presents with    New Patient     Mr. Shashi Perea is being seen today at the request of Dr. Elliot Tran for aortic insufficiency and CAD. HPI Mr. Shashi Perea has been followed for a known heart murmur for at least the last half dozen years. Is always been entirely asymptomatic from this. In questioning him more directly this morning, he truly does not have any symptoms doing any of the activities he normally does which includes a daily walk and normal yard maintenance at his home. He is a non-smoker. He remembers very specifically what his symptoms were prior to his coronary bypass procedure. He has had no return of that type of chest discomfort since then. He tells me that he was recently evaluated for a clinical trial related to transcatheter aortic valve replacement for aortic valve insufficiency but was turned down ostensibly because of the diameter of his coronary sinuses. He, his son and daughter come to the office today to discuss treatment options for his aortic valve regurgitation. Review of Systems   Constitutional: Negative. HENT:  Positive for dental problem (2 teeth pulled last week) and hearing loss (+ bilateral hearing aids). Negative for trouble swallowing. Eyes: Negative. Respiratory:  Negative for chest tightness and shortness of breath. Cardiovascular: Negative. Gastrointestinal: Negative. Endocrine: Negative. Genitourinary: Negative. Musculoskeletal: Negative. Skin: Negative. Allergic/Immunologic: Negative. Neurological:  Positive for light-headedness. Negative for dizziness and syncope. Hematological:  Bruises/bleeds easily. + Von Willebrand's   Psychiatric/Behavioral: Negative.      Past Medical History:   Diagnosis Date    Acute ischemic stroke ([facility] [address] 10/24/2014    Right    Anemia     Aortic insufficiency     Arthritis     rt knee    Bilateral sensorineural hearing loss 05/09/2019    CAD (coronary artery disease) 05/29/2014    LAD, RCA, Diag - cariothoracic surgery    Cataracts, bilateral 07/2020    Colon polyps 02/03/2020    Tubular adenoma, Dr. Geovanny Corea    Depression     [place] (hard of hearing)     hermes-lateral hearing aids    HTN (hypertension)     Hyperlipidemia     Hypokalemia     Jaw dislocation     Von Willebrand disease     Walking pneumonia      Past Surgical History:   Procedure Laterality Date    COLONOSCOPY  02/03/2020    Dr. Geovanny Corea    COLONOSCOPY N/A 02/03/2020    COLONOSCOPY POLYPECTOMY SNARE/COLD BIOPSY performed by Reginaldo Shepard MD at [facility]      6/2/2014 by Dr. Beny Cooper Right 06/10/2022    RIGHT INGUINAL HERNIA REPAIR WITH MESH performed by Shamir Otto MD at [address] [facility] Right 07/31/2020    Phacoemulsification with intraocular lens implant performed by Alcira Mandel MD at [address] Left 08/06/2020    Phacoemulsification with intraocular lens implant performed by Alcira Mandel MD at [address] [facility] Right 10/21/2014    right total knee replacement    TONSILLECTOMY AND ADENOIDECTOMY  1955     No Known Allergies  Current Outpatient Medications   Medication Sig Dispense Refill    metoprolol succinate (TOPROL XL) 50 MG extended release tablet Take 1 tablet by mouth daily 30 tablet 4    lisinopril (PRINIVIL;ZESTRIL) 40 MG tablet TAKE 1 TABLET BY MOUTH EVERY DAY 90 tablet 1    atorvastatin (LIPITOR) 80 MG tablet TAKE 1 TABLET BY MOUTH EVERY DAY 90 tablet 1    amLODIPine (NORVASC) 10 MG tablet TAKE 1 TABLET BY MOUTH EVERY DAY 90 tablet 1    aspirin 81 MG tablet Take 81 mg by mouth daily. No current facility-administered medications for this visit. Social History     Socioeconomic History    Marital status:       Spouse name: Not on file    Number of children: Not on file    Years of education: Not on file    Highest education level: Not on file   Occupational History    Not on file   Tobacco Use    Smoking status: Never    Smokeless tobacco: Never   Vaping Use    Vaping Use: Never used   Substance and Sexual Activity    Alcohol use: Yes     Alcohol/week: 3.0 standard drinks     Types: 3 Cans of beer per week     Comment: rare    Drug use: No    Sexual activity: Not Currently     Partners: Female     Comment:    Other Topics Concern    Not on file   Social History Narrative    Not on file     Social Determinants of Health     Financial Resource Strain: Not on file   Food Insecurity: Not on file   Transportation Needs: Not on file   Physical Activity: Not on file   Stress: Not on file   Social Connections: Not on file   Intimate Partner Violence: Not on file   Housing Stability: Not on file     Family History   Problem Relation Age of Onset    Other Mother 35        blood clot    High Blood Pressure Father     Heart Disease Father 54        CAD, CABG    Clotting Disorder Sister         Von Willebrand's      Objective:   Physical Exam  Constitutional:       General: He is not in acute distress. Appearance: Normal appearance. He is well-developed and normal weight. He is not diaphoretic. HENT:      Head: Normocephalic. Comments: Wears bilateral hearing aids     Nose: Nose normal.      Mouth/Throat:      Mouth: Mucous membranes are moist.   Eyes:      General: No scleral icterus. Conjunctiva/sclera: Conjunctivae normal.      Pupils: Pupils are equal, round, and reactive to light. Neck:      Thyroid: No thyromegaly. Vascular: No carotid bruit or JVD. Trachea: No tracheal deviation. Cardiovascular:      Rate and Rhythm: Normal rate and regular rhythm. Heart sounds: Murmur heard. No friction rub. No gallop.       Comments: Classic waterhammer pulses in both wrists and both ankles    3/6 systolic ejection murmur, 2/6 diastolic murmur both heard best along right upper sternal border  Pulmonary:      Effort: Pulmonary effort is normal. No respiratory distress. Breath sounds: Normal breath sounds. No stridor. No wheezing or rales. Abdominal:      General: Bowel sounds are normal. There is no distension. Palpations: Abdomen is soft. There is no mass. Tenderness: There is no abdominal tenderness. There is no guarding. Musculoskeletal:         General: No deformity. Normal range of motion. Cervical back: Normal range of motion. Right lower leg: No edema. Left lower leg: No edema. Skin:     General: Skin is warm and dry. Capillary Refill: Capillary refill takes less than 2 seconds. Coloration: Skin is not jaundiced or pale. Findings: No erythema or rash. Neurological:      General: No focal deficit present. Mental Status: He is alert and oriented to person, place, and time. Cranial Nerves: No cranial nerve deficit. Coordination: Coordination normal.   Psychiatric:         Mood and Affect: Mood normal.         Behavior: Behavior normal.         Thought Content: Thought content normal.         Judgment: Judgment normal.     Vitals:    02/15/23 1052 02/15/23 1056   BP: (!) 160/58 (!) 154/56   Site: Left Upper Arm Right Upper Arm   Position: Sitting Sitting   Pulse: 74    Temp: 98.2 °F (36.8 °C)    TempSrc: Infrared    SpO2: 96%    Weight: 173 lb 11.2 oz (78.8 kg)    Height: 5' 9\" (1.753 m)       Assessment:      79-year-old asymptomatic male status post coronary bypass grafting in 2014 with chronic von Willebrand's disease, moderate to severe aortic valve regurgitation and dilating left ventricle. Plan:      How to manage Mr. Kiran Ch is aortic valve regurgitation is somewhat problematic. The objective data is what is driving the discussion for some type of procedural intervention for his aortic valve.   His pressure half-time is trending towards severe aortic valve regurgitation but is not there yet. His left ventricle is clearly dilating and is now notably above the upper limits of normal.  What is remarkable is how completely asymptomatic he is. If he were 46years old and had not had prior bypass surgery, pursuing surgical options for him would be straightforward. Given that he has had prior bypass surgery, that these grafts are functioning and doing well for him, and that he is asymptomatic, along with the fact that he has a chronic coagulopathy, he must take pause as to the risk-benefit ratio of a repeat surgical procedure. I had a very lengthy and detailed discussion with the patient and his children about the pathways we could pursue. This would include ongoing medical management and observation, off label transcatheter replacement of his aortic valve if this option is indeed available, or, finally, repeat sternotomy with aortic valve replacement. My personal view is that the risk-benefit ratio of surgery is hard to justify if any type of percutaneous option is available to him even if off label. They will be sorting out with their other physicians as to whether or not a transcatheter option is indeed available. My hope, for his sake, is that this is the case. If not, then I would still advocate for ongoing observation with more aggressive blood pressure management. Today he presented with a blood pressure in the 160s. This would [place] only aggravate his potential to dilate. If his ventricular geometry could be maintained at his current level with more aggressive blood pressure management, then staying the course and continue to follow him with serial echocardiograms would be reasonable. If, despite this, his ventricle were still seen to enlarge or he were to develop symptoms of the first time, then moving forward with a relative risks of an open surgical procedure would be reasonable.     Again, we discussed all these things at considerable length with him and his family and all their questions were answered. They will wait to hear from Dr. Jodi Colon about neck steps. Additionally I believe the patient's daughter will call the [facility] in [place] that is conducting the clinical trial for transcatheter aortic valve replacement in someone with aortic valve insufficiency. Thank you very much for the opportunity to see Gume Tineo in consultation today. It was a pleasure meeting him and his son and daughter.

## 2023-02-28 DIAGNOSIS — I10 BENIGN ESSENTIAL HTN: ICD-10-CM

## 2023-02-28 DIAGNOSIS — E78.5 HYPERLIPIDEMIA, UNSPECIFIED HYPERLIPIDEMIA TYPE: ICD-10-CM

## 2023-02-28 RX ORDER — LISINOPRIL 40 MG/1
TABLET ORAL
Qty: 90 TABLET | Refills: 0 | Status: SHIPPED | OUTPATIENT
Start: 2023-02-28

## 2023-02-28 NOTE — TELEPHONE ENCOUNTER
Last OV:  12/7/22  Next OV:  3/27/23  Last refill: 9/7/22  #90  1 r/f  Most recent Labs: 12/30/22   Last EKG (if needed): 1/3/23

## 2023-03-01 ENCOUNTER — OFFICE VISIT (OUTPATIENT)
Dept: INTERNAL MEDICINE CLINIC | Age: 73
End: 2023-03-01
Payer: MEDICARE

## 2023-03-01 VITALS
HEIGHT: 69 IN | DIASTOLIC BLOOD PRESSURE: 60 MMHG | HEART RATE: 64 BPM | RESPIRATION RATE: 14 BRPM | WEIGHT: 173 LBS | SYSTOLIC BLOOD PRESSURE: 136 MMHG | OXYGEN SATURATION: 97 % | BODY MASS INDEX: 25.62 KG/M2

## 2023-03-01 DIAGNOSIS — Z12.5 SCREENING FOR PROSTATE CANCER: ICD-10-CM

## 2023-03-01 DIAGNOSIS — D68.00 VON WILLEBRAND'S DISEASE: ICD-10-CM

## 2023-03-01 DIAGNOSIS — R73.01 IMPAIRED FASTING GLUCOSE: ICD-10-CM

## 2023-03-01 DIAGNOSIS — I10 BENIGN ESSENTIAL HTN: ICD-10-CM

## 2023-03-01 DIAGNOSIS — F32.1 CURRENT MODERATE EPISODE OF MAJOR DEPRESSIVE DISORDER WITHOUT PRIOR EPISODE (HCC): ICD-10-CM

## 2023-03-01 DIAGNOSIS — Z13.1 SCREENING FOR DIABETES MELLITUS: ICD-10-CM

## 2023-03-01 DIAGNOSIS — I35.1 NONRHEUMATIC AORTIC VALVE INSUFFICIENCY: ICD-10-CM

## 2023-03-01 DIAGNOSIS — Z00.00 MEDICARE ANNUAL WELLNESS VISIT, SUBSEQUENT: ICD-10-CM

## 2023-03-01 DIAGNOSIS — E78.5 HYPERLIPIDEMIA, UNSPECIFIED HYPERLIPIDEMIA TYPE: ICD-10-CM

## 2023-03-01 DIAGNOSIS — H90.3 BILATERAL SENSORINEURAL HEARING LOSS: ICD-10-CM

## 2023-03-01 DIAGNOSIS — Z00.00 MEDICARE ANNUAL WELLNESS VISIT, SUBSEQUENT: Primary | ICD-10-CM

## 2023-03-01 PROCEDURE — 3078F DIAST BP <80 MM HG: CPT | Performed by: INTERNAL MEDICINE

## 2023-03-01 PROCEDURE — G0439 PPPS, SUBSEQ VISIT: HCPCS | Performed by: INTERNAL MEDICINE

## 2023-03-01 PROCEDURE — 3075F SYST BP GE 130 - 139MM HG: CPT | Performed by: INTERNAL MEDICINE

## 2023-03-01 PROCEDURE — 1123F ACP DISCUSS/DSCN MKR DOCD: CPT | Performed by: INTERNAL MEDICINE

## 2023-03-01 RX ORDER — ATORVASTATIN CALCIUM 80 MG/1
TABLET, FILM COATED ORAL
Qty: 90 TABLET | Refills: 0 | Status: SHIPPED | OUTPATIENT
Start: 2023-03-01

## 2023-03-01 RX ORDER — AMLODIPINE BESYLATE 10 MG/1
TABLET ORAL
Qty: 90 TABLET | Refills: 0 | Status: SHIPPED | OUTPATIENT
Start: 2023-03-01

## 2023-03-01 SDOH — ECONOMIC STABILITY: INCOME INSECURITY: HOW HARD IS IT FOR YOU TO PAY FOR THE VERY BASICS LIKE FOOD, HOUSING, MEDICAL CARE, AND HEATING?: NOT HARD AT ALL

## 2023-03-01 SDOH — ECONOMIC STABILITY: HOUSING INSECURITY
IN THE LAST 12 MONTHS, WAS THERE A TIME WHEN YOU DID NOT HAVE A STEADY PLACE TO SLEEP OR SLEPT IN A SHELTER (INCLUDING NOW)?: NO

## 2023-03-01 SDOH — ECONOMIC STABILITY: FOOD INSECURITY: WITHIN THE PAST 12 MONTHS, THE FOOD YOU BOUGHT JUST DIDN'T LAST AND YOU DIDN'T HAVE MONEY TO GET MORE.: NEVER TRUE

## 2023-03-01 SDOH — ECONOMIC STABILITY: FOOD INSECURITY: WITHIN THE PAST 12 MONTHS, YOU WORRIED THAT YOUR FOOD WOULD RUN OUT BEFORE YOU GOT MONEY TO BUY MORE.: NEVER TRUE

## 2023-03-01 ASSESSMENT — PATIENT HEALTH QUESTIONNAIRE - PHQ9
8. MOVING OR SPEAKING SO SLOWLY THAT OTHER PEOPLE COULD HAVE NOTICED. OR THE OPPOSITE, BEING SO FIGETY OR RESTLESS THAT YOU HAVE BEEN MOVING AROUND A LOT MORE THAN USUAL: 0
4. FEELING TIRED OR HAVING LITTLE ENERGY: 0
SUM OF ALL RESPONSES TO PHQ9 QUESTIONS 1 & 2: 0
SUM OF ALL RESPONSES TO PHQ QUESTIONS 1-9: 0
2. FEELING DOWN, DEPRESSED OR HOPELESS: 0
SUM OF ALL RESPONSES TO PHQ QUESTIONS 1-9: 0
7. TROUBLE CONCENTRATING ON THINGS, SUCH AS READING THE NEWSPAPER OR WATCHING TELEVISION: 0
1. LITTLE INTEREST OR PLEASURE IN DOING THINGS: 0
SUM OF ALL RESPONSES TO PHQ QUESTIONS 1-9: 0
10. IF YOU CHECKED OFF ANY PROBLEMS, HOW DIFFICULT HAVE THESE PROBLEMS MADE IT FOR YOU TO DO YOUR WORK, TAKE CARE OF THINGS AT HOME, OR GET ALONG WITH OTHER PEOPLE: 0
5. POOR APPETITE OR OVEREATING: 0
9. THOUGHTS THAT YOU WOULD BE BETTER OFF DEAD, OR OF HURTING YOURSELF: 0
6. FEELING BAD ABOUT YOURSELF - OR THAT YOU ARE A FAILURE OR HAVE LET YOURSELF OR YOUR FAMILY DOWN: 0
SUM OF ALL RESPONSES TO PHQ QUESTIONS 1-9: 0
3. TROUBLE FALLING OR STAYING ASLEEP: 0

## 2023-03-01 ASSESSMENT — LIFESTYLE VARIABLES
HOW MANY STANDARD DRINKS CONTAINING ALCOHOL DO YOU HAVE ON A TYPICAL DAY: 1 OR 2
HOW OFTEN DO YOU HAVE A DRINK CONTAINING ALCOHOL: 2-4 TIMES A MONTH

## 2023-03-01 NOTE — PROGRESS NOTES
[facility]) Physicians  Internal Medicine  Patient Encounter  Arlette Head D.O., [facility] Annual Wellness Visit  Aparna Kamara  3/1/2023    Subjective Aparna Kamara is here for Medicare AWV      Medical/Surgical Histories     Past Medical History:   Diagnosis Date    Acute ischemic stroke ([address] 10/24/2014    Right    Anemia     Aortic insufficiency     Arthritis     rt knee    Bilateral sensorineural hearing loss 05/09/2019    CAD (coronary artery disease) 05/29/2014    LAD, RCA, Diag - cariothoracic surgery    Cataracts, bilateral 07/2020    Chronic systolic (congestive) heart failure 2/15/2023    Colon polyps 02/03/2020    Tubular adenoma, Dr. Bela Meier (hard of hearing)     hermes-lateral hearing aids    HTN (hypertension)     Hyperlipidemia     Hypokalemia     Jaw dislocation     Von Willebrand disease     Walking pneumonia           Past Surgical History:   Procedure Laterality Date    COLONOSCOPY  02/03/2020    Dr. Jg Mckeon    COLONOSCOPY N/A 02/03/2020    COLONOSCOPY POLYPECTOMY SNARE/COLD BIOPSY performed by Brooks Briones MD at [address]      6/2/2014 by Dr. Mayito Arroyo Right 06/10/2022    RIGHT INGUINAL HERNIA REPAIR WITH MESH performed by Jay Keys MD at [address] [facility] Right 07/31/2020    Phacoemulsification with intraocular lens implant performed by Adi Armstrong MD at [address] Left 08/06/2020    Phacoemulsification with intraocular lens implant performed by Adi Armstrong MD at [address] [facility] Right 10/21/2014    right total knee replacement    TONSILLECTOMY AND ADENOIDECTOMY  1955           Medications/Allergies     Current Outpatient Medications   Medication Sig    lisinopril (PRINIVIL;ZESTRIL) 40 MG tablet TAKE 1 TABLET BY MOUTH EVERY DAY    metoprolol succinate (TOPROL XL) 50 MG extended release tablet Take 1 tablet by mouth daily    atorvastatin (LIPITOR) 80 MG tablet TAKE 1 TABLET BY MOUTH EVERY DAY    amLODIPine (NORVASC) 10 MG tablet TAKE 1 TABLET BY MOUTH EVERY DAY    aspirin 81 MG tablet Take 81 mg by mouth daily.       No Known Allergies      Substance Use History     Social History     Tobacco Use    Smoking status: Never    Smokeless tobacco: Never   Vaping Use    Vaping Use: Never used   Substance Use Topics    Alcohol use: Yes     Alcohol/week: 3.0 standard drinks     Types: 3 Cans of beer per week     Comment: rare    Drug use: No          Family History     Family History   Problem Relation Age of Onset    Other Mother 33        blood clot    High Blood Pressure Father     Heart Disease Father 55        CAD, CABG    Clotting Disorder Sister         Von Willebrand's            CARE TEAM  (Including outside providers/suppliers regularly involved in providing care):   Patient Care Team:  Albert Lopez DO as PCP - General (Internal Medicine)  Albert Lopez DO as PCP - Empaneled Provider  Mariangel Freeman MD as Consulting Physician (Cardiothoracic Surgery)  Jarocho Angel Jr., MD as Consulting Physician (Hematology and Oncology)  Tre Almeida MD as Consulting Physician (Gastroenterology)  DEIRDRE Garcia (Physician Assistant)      Patient's complete Health Risk Assessment and screening values have been reviewed and are found in Flowsheets. The following risks were reviewed today and where indicated follow up appointments were made and/or referrals ordered.    Positive Risk Factor Screenings with Interventions:                No Positive Risk Factors identified today.              ROS:  HEENT:  Biggest complaint is still poor hearing.  He wears hearing aides.  Affects his quality of life.  He does worse in larger crowds.   CV: Aortic Regurgitation-- Has been seen at [facility].  He was not able to get into the trial. He has also seen Dr. Akshat Maya.  Report  reviewed. His cardiologist is Dr. Ryan Hensley. He denies KING, CP, syncope, swelling. He has follow with Dr. Ryan Hensley 3/27/2023  He has an appointment with Dr. Autumn Andrews      Last Cardiac Cath 1/3/2023--  FINDINGS:  1. Right-dominant coronary arterial circulation. There is 99% proximal  RCA lesion. There is competitive flow in the distal RCA from a patent  vein graft _____ was not able to be selectively injected, but did appear  patent with no flow limitations from nonselective injections. The left  system has no left main disease. The circumflex is very small and just  only runs in the AV groove. There is a large ramus intermedius branch  with 40% ostial disease. Left anterior descending artery has a 99%  proximal to mid lesion. The distal LAD fills from a patent left  internal mammary artery graft. 2.  Low-normal left ventricular systolic function. LV ejection fraction  of 50%. 3.  Low left ventricular end-diastolic pressure at 10 mmHg. 4.  No gradient across the aortic valve on pullback to suggest aortic  stenosis. Known severe aortic insufficiency. 5.  Mild mitral regurgitation. 6.  Mild-to-moderate left atrial enlargement. OBJECTIVE     Vitals:    03/01/23 1011   BP: 136/60   Pulse: 64   Resp: 14   SpO2: 97%   Weight: 173 lb (78.5 kg)   Height: 5' 9\" (1.753 m)     Body mass index is 25.55 kg/m². Wt Readings from Last 3 Encounters:   03/01/23 173 lb (78.5 kg)   02/15/23 173 lb 11.2 oz (78.8 kg)   02/08/23 174 lb 2.6 oz (79 kg)     BP Readings from Last 3 Encounters:   03/01/23 136/60   02/15/23 (!) 154/56   02/08/23 (!) 147/60          GEN:  67 y.o. male who is in NAD, A&O. He appears stated age and well nourished. He is cooperative and pleasant. HEAD:  NC/AT, no lesions. EYES:  MALKA, EOMI, No scleral icterus or conjunctival injection or discharge. Visual fields in tact to confrontation.    EARS:  EAC's clear, TM's normal.  Very hard of hearing  NOSE:  Nasal cavity is clear.  No mucosal congestion or discharge. Sinuses are nontender. MOUTH & THROAT:  Oral cavity is clear without mucosal lesions. Tongue is midline. Dentition is in good repair. No pharyngeal erythema or exudate. NECK:  Supple. Full ROM. Trachea is midline. No increased JVD. No thyromegaly or nodules. No masses  LYMPH: No C/SC/A/F nodes  CARDIAC:  S1S2 NL. Regular rhythm.   +3-4/3 systolic murmur, + 2/6 diastolic murmur. VASC:  Pedal pulses 2/4. Carotid upstrokes 2+. No bruits noted. PULM:  Lungs are CTA. Symmetric breath sounds noted. AP Diameter NL.  EXT:  No Cyanosis or clubbing. No edema. PSYCH:  Mood and affect NL. Judgement and insight NL. Reviewed and updated this visit:  Allergies  Meds         ASSESSMENT/PLAN     ASSESSMENT/PLAN:    1. Medicare annual wellness visit, subsequent  All care gaps identified and addressed  Recheck lab  Vaccines are up-to-date  Cancer screenings are up-to-date. Will check PSA   - CBC with Auto Differential; Future  - Comprehensive Metabolic Panel; Future  - Lipid Panel; Future  - Hemoglobin A1C; Future  - TSH; Future  - PSA Screening; Future    2. Current moderate episode of major depressive disorder without prior episode ([address]  Much improved even off of SSRI  Continue to monitor    3. Frannie Merlin disease  Patient does have a history of mucosal bleeding and easy bruising  He generally requires Humate-P before any surgical procedure. This will be important if any type of aortic valve surgery is contemplated  Literature provided on fall risk reduction strategies. 4. Impaired fasting glucose  Asymptomatic  Check A1c to ensure he has not transitioned to type 2 diabetes  - Comprehensive Metabolic Panel; Future  - Lipid Panel; Future  - Hemoglobin A1C; Future    5. Screening for diabetes mellitus    - Comprehensive Metabolic Panel; Future  - Hemoglobin A1C; Future    6.  Nonrheumatic aortic valve insufficiency  Under evaluation for possible surgical repair or replacement  Patient has been [facility] at the [facility]. He did not qualify for a trial  He was then seen by Dr. Mala Roach. According to the EHR he has an appointment with Dr. Autumn Andrews. The question is whether or not the patient is a candidate for any transcatheter procedure versus open heart    7. Bilateral sensorineural hearing loss  Continue supportive care  Continue hearing aids    8. Benign essential HTN  Blood pressure is fairly well controlled but could use some tightening  We will see what his cardiologist wants to do  Consider increasing the metoprolol ER to 75 mg daily  - CBC with Auto Differential; Future  - Comprehensive Metabolic Panel; Future  - Lipid Panel; Future  - TSH; Future    9. Hyperlipidemia, unspecified hyperlipidemia type    - Comprehensive Metabolic Panel; Future  - Lipid Panel; Future  - TSH; Future    10. Screening for prostate cancer    - PSA Screening; Future          Recommendations for Preventive Services Due: see orders and patient instructions/AVS.  Recommended screening schedule for the next 5-10 years is provided to the patient in written form: see Patient Instructions/AVS.    Return for Medicare Annual Wellness Visit in 1 year.

## 2023-03-01 NOTE — PATIENT INSTRUCTIONS
Personalized Preventive Plan for [place] Smith - 3/1/2023  Medicare offers a range of preventive health benefits. Some of the tests and screenings are paid in full while other may be subject to a deductible, co-insurance, and/or copay. Some of these benefits include a comprehensive review of your medical history including lifestyle, illnesses that may run in your family, and various assessments and screenings as appropriate. After reviewing your medical record and screening and assessments performed today your provider may have ordered immunizations, labs, imaging, and/or referrals for you. A list of these orders (if applicable) as well as your Preventive Care list are included within your After Visit Summary for your review. Other Preventive Recommendations:    A preventive eye exam performed by an eye specialist is recommended every 1-2 years to screen for glaucoma; cataracts, macular degeneration, and other eye disorders. A preventive dental visit is recommended every 6 months. Try to get at least 150 minutes of exercise per week or 10,000 steps per day on a pedometer . Order or download the FREE \"Exercise & Physical Activity: Your Everyday Guide\" from [employer]. Call 8-537.906.1654 or search [employer] online. You need 7887-9282 mg of calcium and 4787-0904 IU of vitamin D per day. It is possible to meet your calcium requirement with diet alone, but a vitamin D supplement is usually necessary to meet this goal.  When exposed to the sun, use a sunscreen that protects against both UVA and UVB radiation with an SPF of 30 or greater. Reapply every 2 to 3 hours or after sweating, drying off with a towel, or swimming. Always wear a seat belt when traveling in a car. Advance Directives: Care Instructions  Overview  An advance directive is a legal way to state your wishes at the end of your life.  It tells your family and your doctor what to do if you can't say what you want. There are two main types of advance directives. You can change them any time your wishes change. Living will. This form tells your family and your doctor your wishes about life support and other treatment. The form is also called a declaration. Medical power of . This form lets you name a person to make treatment decisions for you when you can't speak for yourself. This person is called a health care agent (health care proxy, health care surrogate). The form is also called a durable power of  for health care. If you do not have an advance directive, decisions about your medical care may be made by a family member, or by a doctor or a  who doesn't know you. It may help to think of an advance directive as a gift to the people who care for you. If you have one, they won't have to make tough decisions by themselves. For more information, including forms for your state, see the [address] [facility] website (www.caringinfo.org/planning/advance-directives/). Follow-up care is a key part of your treatment and safety. Be sure to make and go to all appointments, and call your doctor if you are having problems. It's also a good idea to know your test results and keep a list of the medicines you take. What should you include in an advance directive? Many states have a unique advance directive form. (It may ask you to address specific issues.) Or you might use a universal form that's approved by many states. If your form doesn't tell you what to address, it may be hard to know what to include in your advance directive. Use the questions below to help you get started. Who do you want to make decisions about your medical care if you are not able to? What life-support measures do you want if you have a serious illness that gets worse over time or can't be cured? What are you most afraid of that might happen?  (Maybe you're afraid of having pain, losing your independence, or being kept alive by machines.)  Where would you prefer to die? (Your home? A hospital? A nursing home?)  Do you want to donate your organs when you die? Do you want certain Islam practices performed before you die? When should you call for help? Be sure to contact your doctor if you have any questions. Where can you learn more? Go to http://www.bonilla.com/ and enter R264 to learn more about \"Advance Directives: Care Instructions. \"  Current as of: June 16, 2022               Content Version: 13.5  © 9692-0461 [employer]. Care instructions adapted under license by [facility] ([facility]). If you have questions about a medical condition or this instruction, always ask your healthcare professional. [address] any warranty or liability for your use of this information. A Healthy Heart: Care Instructions  Your Care Instructions     Coronary artery disease, also called heart disease, occurs when a substance called plaque builds up in the vessels that supply oxygen-rich blood to your heart muscle. This can narrow the blood vessels and reduce blood flow. A heart attack happens when blood flow is completely blocked. A high-fat diet, smoking, and other factors increase the risk of heart disease. Your doctor has found that you have a chance of having heart disease. You can do lots of things to keep your heart healthy. It may not be easy, but you can change your diet, exercise more, and quit smoking. These steps really work to lower your chance of heart disease. Follow-up care is a key part of your treatment and safety. Be sure to make and go to all appointments, and call your doctor if you are having problems. It's also a good idea to know your test results and keep a list of the medicines you take. How can you care for yourself at home? Diet    Use less salt when you cook and eat. This helps lower your blood pressure. Taste food before salting.  Add only a little salt when you think you need it. With time, your taste buds will adjust to less salt. Eat fewer snack items, fast foods, canned soups, and other high-salt, high-fat, processed foods. Read food labels and try to avoid saturated and trans fats. They increase your risk of heart disease by raising cholesterol levels. Limit the amount of solid fat-butter, margarine, and shortening-you eat. Use olive, peanut, or canola oil when you cook. Bake, broil, and steam foods instead of frying them. Eat a variety of fruit and vegetables every day. Dark green, deep orange, red, or yellow fruits and vegetables are especially good for you. Examples include spinach, carrots, peaches, and berries. Foods high in fiber can reduce your cholesterol and provide important vitamins and minerals. High-fiber foods include whole-grain cereals and breads, oatmeal, beans, brown rice, citrus fruits, and apples. Eat lean proteins. Heart-healthy proteins include seafood, lean meats and poultry, eggs, beans, peas, nuts, seeds, and soy products. Limit drinks and foods with added sugar. These include candy, desserts, and soda pop. Lifestyle changes    If your doctor recommends it, get more exercise. Walking is a good choice. Bit by bit, increase the amount you walk every day. Try for at least 30 minutes on most days of the week. You also may want to swim, bike, or do other activities. Do not smoke. If you need help quitting, talk to your doctor about stop-smoking programs and medicines. These can increase your chances of quitting for good. Quitting smoking may be the most important step you can take to protect your heart. It is never too late to quit. Limit alcohol to 2 drinks a day for men and 1 drink a day for women. Too much alcohol can cause health problems. Manage other health problems such as diabetes, high blood pressure, and high cholesterol.  If you think you may have a problem with alcohol or drug use, talk to your doctor. Medicines    Take your medicines exactly as prescribed. Call your doctor if you think you are having a problem with your medicine. If your doctor recommends aspirin, take the amount directed each day. Make sure you take aspirin and not another kind of pain reliever, such as acetaminophen (Tylenol). When should you call for help? Call 911 if you have symptoms of a heart attack. These may include:    Chest pain or pressure, or a strange feeling in the chest.     Sweating. Shortness of breath. Pain, pressure, or a strange feeling in the back, neck, jaw, or upper belly or in one or both shoulders or arms. Lightheadedness or sudden weakness. A fast or irregular heartbeat. After you call 911, the  may tell you to chew 1 adult-strength or 2 to 4 low-dose aspirin. Wait for an ambulance. Do not try to drive yourself. Watch closely for changes in your health, and be sure to contact your doctor if you have any problems. Where can you learn more? Go to http://www.bonilla.com/ and enter F075 to learn more about \"A Healthy Heart: Care Instructions. \"  Current as of: September 7, 2022               Content Version: 13.5  © 2006-2022 Healthwise[employer]. Care instructions adapted under license by [facility] [employer] [facility] ([facility]). If you have questions about a medical condition or this instruction, always ask your healthcare professional. [place] [address] any warranty or liability for your use of this information.

## 2023-03-02 LAB
A/G RATIO: 1.7 (ref 1.1–2.2)
ALBUMIN SERPL-MCNC: 4 G/DL (ref 3.4–5)
ALP BLD-CCNC: 65 U/L (ref 40–129)
ALT SERPL-CCNC: 9 U/L (ref 10–40)
ANION GAP SERPL CALCULATED.3IONS-SCNC: 10 MMOL/L (ref 3–16)
AST SERPL-CCNC: 12 U/L (ref 15–37)
BASOPHILS ABSOLUTE: 0 K/UL (ref 0–0.2)
BASOPHILS RELATIVE PERCENT: 0.6 %
BILIRUB SERPL-MCNC: 0.6 MG/DL (ref 0–1)
BUN BLDV-MCNC: 13 MG/DL (ref 7–20)
CALCIUM SERPL-MCNC: 9.3 MG/DL (ref 8.3–10.6)
CHLORIDE BLD-SCNC: 106 MMOL/L (ref 99–110)
CHOLESTEROL, TOTAL: 132 MG/DL (ref 0–199)
CO2: 27 MMOL/L (ref 21–32)
CREAT SERPL-MCNC: 0.8 MG/DL (ref 0.8–1.3)
EOSINOPHILS ABSOLUTE: 0.1 K/UL (ref 0–0.6)
EOSINOPHILS RELATIVE PERCENT: 2.2 %
ESTIMATED AVERAGE GLUCOSE: 85.3 MG/DL
GFR SERPL CREATININE-BSD FRML MDRD: >60 ML/MIN/{1.73_M2}
GLUCOSE BLD-MCNC: 89 MG/DL (ref 70–99)
HBA1C MFR BLD: 4.6 %
HCT VFR BLD CALC: 40.7 % (ref 40.5–52.5)
HDLC SERPL-MCNC: 56 MG/DL (ref 40–60)
HEMOGLOBIN: 13.9 G/DL (ref 13.5–17.5)
LDL CHOLESTEROL CALCULATED: 64 MG/DL
LYMPHOCYTES ABSOLUTE: 1 K/UL (ref 1–5.1)
LYMPHOCYTES RELATIVE PERCENT: 17.8 %
MCH RBC QN AUTO: 31.7 PG (ref 26–34)
MCHC RBC AUTO-ENTMCNC: 34.1 G/DL (ref 31–36)
MCV RBC AUTO: 93 FL (ref 80–100)
MONOCYTES ABSOLUTE: 0.3 K/UL (ref 0–1.3)
MONOCYTES RELATIVE PERCENT: 5 %
NEUTROPHILS ABSOLUTE: 4 K/UL (ref 1.7–7.7)
NEUTROPHILS RELATIVE PERCENT: 74.4 %
PDW BLD-RTO: 14.7 % (ref 12.4–15.4)
PLATELET # BLD: 176 K/UL (ref 135–450)
PMV BLD AUTO: 8.3 FL (ref 5–10.5)
POTASSIUM SERPL-SCNC: 4.6 MMOL/L (ref 3.5–5.1)
PROSTATE SPECIFIC ANTIGEN: 2.28 NG/ML (ref 0–4)
RBC # BLD: 4.37 M/UL (ref 4.2–5.9)
SODIUM BLD-SCNC: 143 MMOL/L (ref 136–145)
TOTAL PROTEIN: 6.4 G/DL (ref 6.4–8.2)
TRIGL SERPL-MCNC: 59 MG/DL (ref 0–150)
TSH SERPL DL<=0.05 MIU/L-ACNC: 1.17 UIU/ML (ref 0.27–4.2)
VLDLC SERPL CALC-MCNC: 12 MG/DL
WBC # BLD: 5.4 K/UL (ref 4–11)

## 2023-03-09 ENCOUNTER — TELEPHONE (OUTPATIENT)
Dept: CARDIOLOGY CLINIC | Age: 73
End: 2023-03-09

## 2023-03-09 NOTE — TELEPHONE ENCOUNTER
Pts daughter Maribel Miguel called to get our fax # to send a request for some images for Dr Shahana Young. They have an appt with him on 3/22/23. She was told if she goes through [facility] it takes a long time. She is looking for some help to expedite the request. They want any type of scans.     Dr. Paolo Navarro # [phone] fax# [phone]      [facility] # [phone]

## 2023-03-10 NOTE — TELEPHONE ENCOUNTER
Called Dr Selestino Nissen office and spoke with [facility] requesting the images be sent of the cath 1/3/23, cardiac MRI on 11/30/22 and the echo form 10/13/22. Called and spoke with Alejandro Swartz in the cath lab he will push the cath images through to [address] [place] [address] with Shaila Berrios in echo she states she will push it through [facility] cardiology. Called and spoke with Noy Ashley in radiology, she states she will push images through to Dr Jerry Walker. Notified Ryan Pepe at Dr. Selestino Nissen office. Instructed to call with any issues or questions. Patient is scheduled to be seen 3/22/23.

## 2023-03-21 NOTE — ANESTHESIA POSTPROCEDURE EVALUATION
[facility] Department of Anesthesiology  Post-Anesthesia Note       Name:  Sanket Moeller                                  Age:  79 y.o. MRN:  [id number]     Last Vitals & Oxygen Saturation: BP (!) 140/54   Pulse 73   Temp 97.3 °F (36.3 °C) (Temporal)   Resp 14   Ht 5' 9\" (1.753 m)   Wt 200 lb (90.7 kg)   SpO2 94%   BMI 29.53 kg/m²   Patient Vitals for the past 4 hrs:   BP Temp Temp src Pulse Resp SpO2 Height Weight   07/31/20 0837 (!) 140/54 -- Temporal 73 14 94 % -- --   07/31/20 0827 (!) 137/53 97.3 °F (36.3 °C) Temporal 68 15 95 % -- --   07/31/20 0712 (!) 117/54 98.8 °F (37.1 °C) Temporal 80 14 97 % 5' 9\" (1.753 m) 200 lb (90.7 kg)       Level of consciousness:  Awake, alert    Respiratory: Respirations easy, no distress. Stable. Cardiovascular: Hemodynamically stable. Hydration: Adequate. PONV: Adequately managed. Post-op pain: Adequately controlled. Post-op assessment: Tolerated anesthetic well without complication. Complications:  None.     Jeferson Lopez MD  July 31, 2020   8:58 AM
bilateral lower extremity grossly 3/5

## 2023-06-05 ENCOUNTER — OFFICE VISIT (OUTPATIENT)
Dept: CARDIOLOGY CLINIC | Age: 73
End: 2023-06-05
Payer: MEDICARE

## 2023-06-05 VITALS
DIASTOLIC BLOOD PRESSURE: 80 MMHG | OXYGEN SATURATION: 97 % | SYSTOLIC BLOOD PRESSURE: 115 MMHG | WEIGHT: 167.2 LBS | HEIGHT: 69 IN | BODY MASS INDEX: 24.76 KG/M2 | HEART RATE: 63 BPM

## 2023-06-05 DIAGNOSIS — Z95.2 S/P AVR: ICD-10-CM

## 2023-06-05 DIAGNOSIS — I10 ESSENTIAL HYPERTENSION: ICD-10-CM

## 2023-06-05 DIAGNOSIS — Z95.1 S/P CABG (CORONARY ARTERY BYPASS GRAFT): ICD-10-CM

## 2023-06-05 DIAGNOSIS — I48.92 PAROXYSMAL ATRIAL FLUTTER (HCC): ICD-10-CM

## 2023-06-05 DIAGNOSIS — E78.2 MIXED HYPERLIPIDEMIA: ICD-10-CM

## 2023-06-05 DIAGNOSIS — I35.1 SEVERE AORTIC INSUFFICIENCY: Primary | ICD-10-CM

## 2023-06-05 DIAGNOSIS — I25.810 CORONARY ARTERY DISEASE INVOLVING CORONARY BYPASS GRAFT OF NATIVE HEART WITHOUT ANGINA PECTORIS: ICD-10-CM

## 2023-06-05 PROCEDURE — 3079F DIAST BP 80-89 MM HG: CPT | Performed by: INTERNAL MEDICINE

## 2023-06-05 PROCEDURE — 93000 ELECTROCARDIOGRAM COMPLETE: CPT | Performed by: INTERNAL MEDICINE

## 2023-06-05 PROCEDURE — 1123F ACP DISCUSS/DSCN MKR DOCD: CPT | Performed by: INTERNAL MEDICINE

## 2023-06-05 PROCEDURE — 99214 OFFICE O/P EST MOD 30 MIN: CPT | Performed by: INTERNAL MEDICINE

## 2023-06-05 PROCEDURE — 3074F SYST BP LT 130 MM HG: CPT | Performed by: INTERNAL MEDICINE

## 2023-06-05 RX ORDER — POLYETHYLENE GLYCOL 3350 17 G/17G
17 POWDER, FOR SOLUTION ORAL DAILY PRN
COMMUNITY

## 2023-06-05 RX ORDER — AMIODARONE HYDROCHLORIDE 100 MG/1
200 TABLET ORAL DAILY
COMMUNITY

## 2023-06-05 RX ORDER — FOLIC ACID 1 MG/1
1 TABLET ORAL DAILY
COMMUNITY

## 2023-06-05 RX ORDER — IRON POLYSACCHARIDE COMPLEX 180 MG
100 CAPSULE ORAL EVERY OTHER DAY
COMMUNITY

## 2023-06-05 RX ORDER — LANOLIN ALCOHOL/MO/W.PET/CERES
100 CREAM (GRAM) TOPICAL DAILY
COMMUNITY

## 2023-06-05 NOTE — PROGRESS NOTES
([facility] [address] 10/24/2014    Right    Anemia     Aortic insufficiency     Arthritis     rt knee    Bilateral sensorineural hearing loss 05/09/2019    CAD (coronary artery disease) 05/29/2014    LAD, RCA, Diag - cariothoracic surgery    Cataracts, bilateral 07/2020    Chronic systolic (congestive) heart failure 2/15/2023    Colon polyps 02/03/2020    Tubular adenoma, Dr. Juan M Williamson (hard of hearing)     hermes-lateral hearing aids    HTN (hypertension)     Hyperlipidemia     Hypokalemia     Jaw dislocation     Von Willebrand disease ([facility] [address]     Walking pneumonia      Past Surgical History:   Procedure Laterality Date    COLONOSCOPY  02/03/2020    Dr. Kingston Denny    COLONOSCOPY N/A 02/03/2020    COLONOSCOPY POLYPECTOMY SNARE/COLD BIOPSY performed by Yobani Maria MD at [facility]      6/2/2014 by Dr. William [place]  05/19/2023    HERNIA REPAIR Right 06/10/2022    RIGHT INGUINAL HERNIA REPAIR WITH MESH performed by Norma Gomez MD at [facility] Right 07/31/2020    Phacoemulsification with intraocular lens implant performed by Juan Luis Quezada MD at [address] Left 08/06/2020    Phacoemulsification with intraocular lens implant performed by Juan Luis Quezada MD at [address] Right 10/21/2014    right total knee replacement    TONSILLECTOMY AND ADENOIDECTOMY  1955     Family History   Problem Relation Age of Onset    Other Mother 35        blood clot    High Blood Pressure Father     Heart Disease Father 54        CAD, CABG    Clotting Disorder Sister         Von Willebrand's     Social History     Tobacco Use    Smoking status: Never    Smokeless tobacco: Never   Vaping Use    Vaping Use: Never used   Substance Use Topics    Alcohol use:  Yes     Alcohol/week: 3.0 standard drinks     Types: 3 Cans of beer per week     Comment: rare

## 2023-06-06 RX ORDER — LISINOPRIL 40 MG/1
TABLET ORAL
Qty: 90 TABLET | Refills: 3 | Status: SHIPPED | OUTPATIENT
Start: 2023-06-06

## 2023-06-06 NOTE — TELEPHONE ENCOUNTER
Last OV: 6/5/23  Next OV: 7/7/23  Last refill: 2/28/23  #90  Most recent Labs: 3/1/23  Last EKG (if needed): 6/5/23    Yes patient is seeing Dr Tiara Collins

## 2023-06-23 ENCOUNTER — HOSPITAL ENCOUNTER (OUTPATIENT)
Dept: CARDIAC REHAB | Age: 73
Setting detail: THERAPIES SERIES
Discharge: HOME | End: 2023-06-23

## 2023-07-03 ENCOUNTER — OFFICE VISIT (OUTPATIENT)
Dept: INTERNAL MEDICINE CLINIC | Age: 73
End: 2023-07-03
Payer: MEDICARE

## 2023-07-03 VITALS
RESPIRATION RATE: 14 BRPM | DIASTOLIC BLOOD PRESSURE: 70 MMHG | WEIGHT: 169 LBS | SYSTOLIC BLOOD PRESSURE: 140 MMHG | HEIGHT: 69 IN | BODY MASS INDEX: 25.03 KG/M2 | HEART RATE: 80 BPM | OXYGEN SATURATION: 98 %

## 2023-07-03 DIAGNOSIS — I25.10 CORONARY ARTERY DISEASE INVOLVING NATIVE CORONARY ARTERY OF NATIVE HEART WITHOUT ANGINA PECTORIS: ICD-10-CM

## 2023-07-03 DIAGNOSIS — I48.92 ATRIAL FLUTTER, UNSPECIFIED TYPE (HCC): ICD-10-CM

## 2023-07-03 DIAGNOSIS — R42 ORTHOSTATIC LIGHTHEADEDNESS: ICD-10-CM

## 2023-07-03 DIAGNOSIS — I10 BENIGN ESSENTIAL HTN: ICD-10-CM

## 2023-07-03 DIAGNOSIS — D64.9 POSTOPERATIVE ANEMIA: ICD-10-CM

## 2023-07-03 DIAGNOSIS — R73.01 IMPAIRED FASTING GLUCOSE: ICD-10-CM

## 2023-07-03 DIAGNOSIS — D68.00 VON WILLEBRAND'S DISEASE (HCC): ICD-10-CM

## 2023-07-03 DIAGNOSIS — Z95.2 S/P AORTIC VALVE REPLACEMENT: ICD-10-CM

## 2023-07-03 DIAGNOSIS — E78.5 HYPERLIPIDEMIA, UNSPECIFIED HYPERLIPIDEMIA TYPE: ICD-10-CM

## 2023-07-03 DIAGNOSIS — I35.1 NONRHEUMATIC AORTIC VALVE INSUFFICIENCY: ICD-10-CM

## 2023-07-03 DIAGNOSIS — D64.9 POSTOPERATIVE ANEMIA: Primary | ICD-10-CM

## 2023-07-03 DIAGNOSIS — L98.9 SKIN LESION OF FACE: ICD-10-CM

## 2023-07-03 PROCEDURE — 99215 OFFICE O/P EST HI 40 MIN: CPT | Performed by: INTERNAL MEDICINE

## 2023-07-03 PROCEDURE — 1123F ACP DISCUSS/DSCN MKR DOCD: CPT | Performed by: INTERNAL MEDICINE

## 2023-07-03 PROCEDURE — 3074F SYST BP LT 130 MM HG: CPT | Performed by: INTERNAL MEDICINE

## 2023-07-03 PROCEDURE — 3078F DIAST BP <80 MM HG: CPT | Performed by: INTERNAL MEDICINE

## 2023-07-03 RX ORDER — AMLODIPINE BESYLATE 10 MG/1
10 TABLET ORAL DAILY
Qty: 90 TABLET | Refills: 3 | Status: SHIPPED | OUTPATIENT
Start: 2023-07-03 | End: 2023-07-04 | Stop reason: SDUPTHER

## 2023-07-03 RX ORDER — METOPROLOL SUCCINATE 50 MG/1
50 TABLET, EXTENDED RELEASE ORAL DAILY
Qty: 90 TABLET | Refills: 3 | Status: SHIPPED | OUTPATIENT
Start: 2023-07-03 | End: 2023-07-04 | Stop reason: SDUPTHER

## 2023-07-03 RX ORDER — ATORVASTATIN CALCIUM 80 MG/1
80 TABLET, FILM COATED ORAL DAILY
Qty: 90 TABLET | Refills: 3 | Status: SHIPPED | OUTPATIENT
Start: 2023-07-03 | End: 2023-07-04 | Stop reason: SDUPTHER

## 2023-07-03 NOTE — PROGRESS NOTES
On this date 7/3/2023 I have spent 45 minutes reviewing previous notes, test results and face to face with the patient discussing the diagnosis and importance of compliance with the treatment plan as well as documenting on the day of the visit. Discussed medications with patient who voiced understanding of their use, indication and potential side effects. Pt also understands the above recommendations. All questions answered. This note was generated completely or in part utilizing Dragon dictation speech recognition software. Occasionally, words are mistranscribed and despite editing, the text may contain inaccuracies due to incorrect word recognition.   If further clarification is needed please contact the office at [phone]       Electronically signed    Silke Baird D.O.

## 2023-07-04 DIAGNOSIS — I10 BENIGN ESSENTIAL HTN: ICD-10-CM

## 2023-07-04 DIAGNOSIS — E78.5 HYPERLIPIDEMIA, UNSPECIFIED HYPERLIPIDEMIA TYPE: ICD-10-CM

## 2023-07-04 LAB
ALBUMIN SERPL-MCNC: 4.6 G/DL (ref 3.4–5)
ALBUMIN/GLOB SERPL: 1.7 {RATIO} (ref 1.1–2.2)
ALP SERPL-CCNC: 91 U/L (ref 40–129)
ALT SERPL-CCNC: 8 U/L (ref 10–40)
ANION GAP SERPL CALCULATED.3IONS-SCNC: 7 MMOL/L (ref 3–16)
AST SERPL-CCNC: 12 U/L (ref 15–37)
BASOPHILS # BLD: 0 K/UL (ref 0–0.2)
BASOPHILS NFR BLD: 0.6 %
BILIRUB SERPL-MCNC: 0.5 MG/DL (ref 0–1)
BUN SERPL-MCNC: 15 MG/DL (ref 7–20)
CALCIUM SERPL-MCNC: 9.8 MG/DL (ref 8.3–10.6)
CHLORIDE SERPL-SCNC: 105 MMOL/L (ref 99–110)
CO2 SERPL-SCNC: 28 MMOL/L (ref 21–32)
CREAT SERPL-MCNC: 0.9 MG/DL (ref 0.8–1.3)
DEPRECATED RDW RBC AUTO: 15.6 % (ref 12.4–15.4)
EOSINOPHIL # BLD: 0.2 K/UL (ref 0–0.6)
EOSINOPHIL NFR BLD: 2.9 %
GFR SERPLBLD CREATININE-BSD FMLA CKD-EPI: >60 ML/MIN/{1.73_M2}
GLUCOSE SERPL-MCNC: 84 MG/DL (ref 70–99)
HCT VFR BLD AUTO: 40.4 % (ref 40.5–52.5)
HGB BLD-MCNC: 13.4 G/DL (ref 13.5–17.5)
LYMPHOCYTES # BLD: 1 K/UL (ref 1–5.1)
LYMPHOCYTES NFR BLD: 16.8 %
MCH RBC QN AUTO: 32.4 PG (ref 26–34)
MCHC RBC AUTO-ENTMCNC: 33.1 G/DL (ref 31–36)
MCV RBC AUTO: 97.8 FL (ref 80–100)
MONOCYTES # BLD: 0.3 K/UL (ref 0–1.3)
MONOCYTES NFR BLD: 5.6 %
NEUTROPHILS # BLD: 4.6 K/UL (ref 1.7–7.7)
NEUTROPHILS NFR BLD: 74.1 %
PLATELET # BLD AUTO: 229 K/UL (ref 135–450)
PMV BLD AUTO: 7.6 FL (ref 5–10.5)
POTASSIUM SERPL-SCNC: 4.5 MMOL/L (ref 3.5–5.1)
PROT SERPL-MCNC: 7.3 G/DL (ref 6.4–8.2)
RBC # BLD AUTO: 4.13 M/UL (ref 4.2–5.9)
SODIUM SERPL-SCNC: 140 MMOL/L (ref 136–145)
WBC # BLD AUTO: 6.2 K/UL (ref 4–11)

## 2023-07-05 ENCOUNTER — HOSPITAL ENCOUNTER (OUTPATIENT)
Dept: CARDIAC REHAB | Age: 73
Setting detail: THERAPIES SERIES
Discharge: HOME | End: 2023-07-05
Payer: MEDICARE

## 2023-07-05 PROCEDURE — 93798 PHYS/QHP OP CAR RHAB W/ECG: CPT

## 2023-07-05 RX ORDER — METOPROLOL SUCCINATE 50 MG/1
50 TABLET, EXTENDED RELEASE ORAL DAILY
Qty: 90 TABLET | Refills: 3 | Status: SHIPPED | OUTPATIENT
Start: 2023-07-05

## 2023-07-05 RX ORDER — AMLODIPINE BESYLATE 10 MG/1
10 TABLET ORAL DAILY
Qty: 90 TABLET | Refills: 3 | Status: SHIPPED | OUTPATIENT
Start: 2023-07-05

## 2023-07-05 RX ORDER — ATORVASTATIN CALCIUM 80 MG/1
80 TABLET, FILM COATED ORAL DAILY
Qty: 90 TABLET | Refills: 3 | Status: SHIPPED | OUTPATIENT
Start: 2023-07-05

## 2023-07-07 ENCOUNTER — HOSPITAL ENCOUNTER (OUTPATIENT)
Dept: CARDIAC REHAB | Age: 73
Setting detail: THERAPIES SERIES
Discharge: HOME | End: 2023-07-07
Payer: MEDICARE

## 2023-07-07 PROCEDURE — 93798 PHYS/QHP OP CAR RHAB W/ECG: CPT

## 2023-07-07 RX ORDER — LISINOPRIL 40 MG/1
40 TABLET ORAL DAILY
Qty: 90 TABLET | Refills: 3 | OUTPATIENT
Start: 2023-07-07

## 2023-07-09 ENCOUNTER — PATIENT MESSAGE (OUTPATIENT)
Dept: INTERNAL MEDICINE CLINIC | Age: 73
End: 2023-07-09

## 2023-07-09 DIAGNOSIS — E78.5 HYPERLIPIDEMIA, UNSPECIFIED HYPERLIPIDEMIA TYPE: ICD-10-CM

## 2023-07-09 DIAGNOSIS — I10 BENIGN ESSENTIAL HTN: ICD-10-CM

## 2023-07-10 ENCOUNTER — HOSPITAL ENCOUNTER (OUTPATIENT)
Dept: CARDIAC REHAB | Age: 73
Setting detail: THERAPIES SERIES
Discharge: HOME | End: 2023-07-10
Payer: MEDICARE

## 2023-07-10 PROCEDURE — 93798 PHYS/QHP OP CAR RHAB W/ECG: CPT

## 2023-07-10 RX ORDER — METOPROLOL SUCCINATE 50 MG/1
50 TABLET, EXTENDED RELEASE ORAL DAILY
Qty: 90 TABLET | Refills: 3 | OUTPATIENT
Start: 2023-07-10

## 2023-07-10 RX ORDER — AMLODIPINE BESYLATE 10 MG/1
10 TABLET ORAL DAILY
Qty: 90 TABLET | Refills: 3 | OUTPATIENT
Start: 2023-07-10

## 2023-07-10 RX ORDER — ATORVASTATIN CALCIUM 80 MG/1
80 TABLET, FILM COATED ORAL DAILY
Qty: 90 TABLET | Refills: 3 | OUTPATIENT
Start: 2023-07-10

## 2023-07-10 NOTE — TELEPHONE ENCOUNTER
From: Lai Styles  To: Dr. Margaret Etienneod: 7/9/2023 3:50 PM EDT  Subject: Prescription refills    Vini Delatorre needs his 4 medications refilled - we have requested through this barbara. He runs out soon. Thanks!

## 2023-07-11 RX ORDER — LISINOPRIL 40 MG/1
40 TABLET ORAL DAILY
Qty: 90 TABLET | Refills: 3 | OUTPATIENT
Start: 2023-07-11

## 2023-07-12 ENCOUNTER — HOSPITAL ENCOUNTER (OUTPATIENT)
Dept: CARDIAC REHAB | Age: 73
Setting detail: THERAPIES SERIES
Discharge: HOME | End: 2023-07-12
Payer: MEDICARE

## 2023-07-12 PROCEDURE — 93798 PHYS/QHP OP CAR RHAB W/ECG: CPT

## 2023-07-17 ENCOUNTER — HOSPITAL ENCOUNTER (OUTPATIENT)
Dept: CARDIAC REHAB | Age: 73
Setting detail: THERAPIES SERIES
Discharge: HOME | End: 2023-07-17
Payer: MEDICARE

## 2023-07-17 PROCEDURE — 93798 PHYS/QHP OP CAR RHAB W/ECG: CPT

## 2023-07-17 RX ORDER — LISINOPRIL 40 MG/1
TABLET ORAL
Qty: 90 TABLET | Refills: 0 | OUTPATIENT
Start: 2023-07-17

## 2023-07-19 ENCOUNTER — HOSPITAL ENCOUNTER (OUTPATIENT)
Dept: CARDIAC REHAB | Age: 73
Setting detail: THERAPIES SERIES
Discharge: HOME | End: 2023-07-19
Payer: MEDICARE

## 2023-07-19 PROCEDURE — 93798 PHYS/QHP OP CAR RHAB W/ECG: CPT

## 2023-07-20 ENCOUNTER — TELEPHONE (OUTPATIENT)
Dept: PHARMACY | Facility: CLINIC | Age: 73
End: 2023-07-20

## 2023-07-20 NOTE — TELEPHONE ENCOUNTER
[facility] CLINICAL PHARMACY: ADHERENCE REVIEW  Identified care gap per [place]: fills at [facility]: ACE/ARB and Statin adherence    ASSESSMENT    ACE/ARB ADHERENCE    Insurance Records claims through 23 (Prior Year [address] = not reported; YTD [address] = FIRST FILL; Potential Fail Date: 23): Lisinopril 40mg last filled on 23 for 90 day supply. Next refill due: 23    Prescribed si tablet/capsule daily    Per Reconcile Dispense History: 23 then reversed     Per [address]: will get 90 day supply ready to  since past due. BP Readings from Last 3 Encounters:   23 (!) 140/70   23 115/80   23 136/60     Estimated Creatinine Clearance: 73 mL/min (based on SCr of 0.9 mg/dL). Lab Results   Component Value Date    CREATININE 0.9 2023     Lab Results   Component Value Date    K 4.5 2023     STATIN ADHERENCE    Insurance Records claims through 23 (Prior Year [address] = not reported; YTD [address] = 78%; Potential Fail Date: 23): Atorvastatin 80mg last filled on 7/3/23 for 90 day supply. Next refill due: 10/1/23    Prescribed si tablet/capsule daily    Per Insurer Portal: filled 7/3 then REVERSED     Last picked up; 3/1/23    Lab Results   Component Value Date    CHOL 132 2023    TRIG 59 2023    HDL 56 2023    LDLCALC 64 2023     ALT   Date Value Ref Range Status   2023 8 (L) 10 - 40 U/L Final     AST   Date Value Ref Range Status   2023 12 (L) 15 - 37 U/L Final     The ASCVD Risk score (Brendan DK, et al., 2019) failed to calculate for the following reasons: The patient has a prior MI or stroke diagnosis       The following are interventions that have been identified:   Patient overdue refilling both and active on home medication list.     Attempting to reach patient to review. Left message asking for return call. SafeToolt message sent to patient.     2 refills ready for  at [address] Visit: 7/3/23  Next

## 2023-07-24 ENCOUNTER — HOSPITAL ENCOUNTER (OUTPATIENT)
Dept: CARDIAC REHAB | Age: 73
Setting detail: THERAPIES SERIES
Discharge: HOME | End: 2023-07-24
Payer: MEDICARE

## 2023-07-24 PROCEDURE — 93798 PHYS/QHP OP CAR RHAB W/ECG: CPT

## 2023-07-26 ENCOUNTER — HOSPITAL ENCOUNTER (OUTPATIENT)
Dept: CARDIAC REHAB | Age: 73
Setting detail: THERAPIES SERIES
Discharge: HOME | End: 2023-07-26
Payer: MEDICARE

## 2023-07-26 PROCEDURE — 93798 PHYS/QHP OP CAR RHAB W/ECG: CPT

## 2023-07-31 ENCOUNTER — HOSPITAL ENCOUNTER (OUTPATIENT)
Dept: CARDIAC REHAB | Age: 73
Setting detail: THERAPIES SERIES
Discharge: HOME | End: 2023-07-31
Payer: MEDICARE

## 2023-07-31 PROCEDURE — 93798 PHYS/QHP OP CAR RHAB W/ECG: CPT

## 2023-08-02 ENCOUNTER — HOSPITAL ENCOUNTER (OUTPATIENT)
Dept: CARDIAC REHAB | Age: 73
Setting detail: THERAPIES SERIES
Discharge: HOME | End: 2023-08-02
Payer: MEDICARE

## 2023-08-02 PROCEDURE — 93798 PHYS/QHP OP CAR RHAB W/ECG: CPT

## 2023-08-07 ENCOUNTER — HOSPITAL ENCOUNTER (OUTPATIENT)
Dept: CARDIAC REHAB | Age: 73
Setting detail: THERAPIES SERIES
Discharge: HOME | End: 2023-08-07
Payer: MEDICARE

## 2023-08-07 PROCEDURE — 93798 PHYS/QHP OP CAR RHAB W/ECG: CPT

## 2023-08-09 ENCOUNTER — HOSPITAL ENCOUNTER (OUTPATIENT)
Dept: CARDIAC REHAB | Age: 73
Setting detail: THERAPIES SERIES
Discharge: HOME | End: 2023-08-09
Payer: MEDICARE

## 2023-08-09 PROCEDURE — 93798 PHYS/QHP OP CAR RHAB W/ECG: CPT

## 2023-08-14 ENCOUNTER — HOSPITAL ENCOUNTER (OUTPATIENT)
Dept: CARDIAC REHAB | Age: 73
Setting detail: THERAPIES SERIES
Discharge: HOME | End: 2023-08-14
Payer: MEDICARE

## 2023-08-14 PROCEDURE — 93798 PHYS/QHP OP CAR RHAB W/ECG: CPT

## 2023-08-16 ENCOUNTER — HOSPITAL ENCOUNTER (OUTPATIENT)
Dept: CARDIAC REHAB | Age: 73
Setting detail: THERAPIES SERIES
Discharge: HOME | End: 2023-08-16
Payer: MEDICARE

## 2023-08-16 PROCEDURE — 93798 PHYS/QHP OP CAR RHAB W/ECG: CPT

## 2023-08-21 ENCOUNTER — HOSPITAL ENCOUNTER (OUTPATIENT)
Dept: CARDIAC REHAB | Age: 73
Setting detail: THERAPIES SERIES
Discharge: HOME | End: 2023-08-21
Payer: MEDICARE

## 2023-08-21 PROCEDURE — 93798 PHYS/QHP OP CAR RHAB W/ECG: CPT

## 2023-08-23 ENCOUNTER — HOSPITAL ENCOUNTER (OUTPATIENT)
Dept: CARDIAC REHAB | Age: 73
Setting detail: THERAPIES SERIES
Discharge: HOME | End: 2023-08-23
Payer: MEDICARE

## 2023-08-23 PROCEDURE — 93798 PHYS/QHP OP CAR RHAB W/ECG: CPT

## 2023-08-28 ENCOUNTER — HOSPITAL ENCOUNTER (OUTPATIENT)
Dept: CARDIAC REHAB | Age: 73
Setting detail: THERAPIES SERIES
Discharge: HOME | End: 2023-08-28
Payer: MEDICARE

## 2023-08-28 PROCEDURE — 93798 PHYS/QHP OP CAR RHAB W/ECG: CPT

## 2023-08-30 ENCOUNTER — HOSPITAL ENCOUNTER (OUTPATIENT)
Dept: CARDIAC REHAB | Age: 73
Setting detail: THERAPIES SERIES
Discharge: HOME | End: 2023-08-30
Payer: MEDICARE

## 2023-08-30 PROCEDURE — 93798 PHYS/QHP OP CAR RHAB W/ECG: CPT

## 2023-10-03 ENCOUNTER — OFFICE VISIT (OUTPATIENT)
Dept: INTERNAL MEDICINE CLINIC | Age: 73
End: 2023-10-03
Payer: MEDICARE

## 2023-10-03 VITALS
HEART RATE: 67 BPM | BODY MASS INDEX: 25.48 KG/M2 | HEIGHT: 69 IN | RESPIRATION RATE: 14 BRPM | OXYGEN SATURATION: 97 % | SYSTOLIC BLOOD PRESSURE: 124 MMHG | DIASTOLIC BLOOD PRESSURE: 82 MMHG | WEIGHT: 172 LBS

## 2023-10-03 DIAGNOSIS — I35.1 NONRHEUMATIC AORTIC VALVE INSUFFICIENCY: ICD-10-CM

## 2023-10-03 DIAGNOSIS — I10 BENIGN ESSENTIAL HTN: ICD-10-CM

## 2023-10-03 DIAGNOSIS — I25.10 CORONARY ARTERY DISEASE INVOLVING NATIVE CORONARY ARTERY OF NATIVE HEART WITHOUT ANGINA PECTORIS: ICD-10-CM

## 2023-10-03 DIAGNOSIS — R73.01 IMPAIRED FASTING GLUCOSE: ICD-10-CM

## 2023-10-03 DIAGNOSIS — F32.9 REACTIVE DEPRESSION: ICD-10-CM

## 2023-10-03 DIAGNOSIS — Z95.2 S/P AORTIC VALVE REPLACEMENT: ICD-10-CM

## 2023-10-03 DIAGNOSIS — I10 BENIGN ESSENTIAL HTN: Primary | ICD-10-CM

## 2023-10-03 DIAGNOSIS — D68.00 VON WILLEBRAND'S DISEASE (HCC): ICD-10-CM

## 2023-10-03 DIAGNOSIS — E78.5 HYPERLIPIDEMIA, UNSPECIFIED HYPERLIPIDEMIA TYPE: ICD-10-CM

## 2023-10-03 DIAGNOSIS — H90.3 BILATERAL SENSORINEURAL HEARING LOSS: ICD-10-CM

## 2023-10-03 PROCEDURE — 99214 OFFICE O/P EST MOD 30 MIN: CPT | Performed by: INTERNAL MEDICINE

## 2023-10-03 PROCEDURE — 3079F DIAST BP 80-89 MM HG: CPT | Performed by: INTERNAL MEDICINE

## 2023-10-03 PROCEDURE — G0008 ADMIN INFLUENZA VIRUS VAC: HCPCS | Performed by: INTERNAL MEDICINE

## 2023-10-03 PROCEDURE — 90694 VACC AIIV4 NO PRSRV 0.5ML IM: CPT | Performed by: INTERNAL MEDICINE

## 2023-10-03 PROCEDURE — 1123F ACP DISCUSS/DSCN MKR DOCD: CPT | Performed by: INTERNAL MEDICINE

## 2023-10-03 PROCEDURE — 3074F SYST BP LT 130 MM HG: CPT | Performed by: INTERNAL MEDICINE

## 2023-10-04 LAB
ALBUMIN SERPL-MCNC: 4.5 G/DL (ref 3.4–5)
ALBUMIN/GLOB SERPL: 2 {RATIO} (ref 1.1–2.2)
ALP SERPL-CCNC: 78 U/L (ref 40–129)
ALT SERPL-CCNC: 14 U/L (ref 10–40)
ANION GAP SERPL CALCULATED.3IONS-SCNC: 9 MMOL/L (ref 3–16)
AST SERPL-CCNC: 15 U/L (ref 15–37)
BASOPHILS # BLD: 0 K/UL (ref 0–0.2)
BASOPHILS NFR BLD: 0.7 %
BILIRUB SERPL-MCNC: 0.8 MG/DL (ref 0–1)
BUN SERPL-MCNC: 11 MG/DL (ref 7–20)
CALCIUM SERPL-MCNC: 9 MG/DL (ref 8.3–10.6)
CHLORIDE SERPL-SCNC: 103 MMOL/L (ref 99–110)
CHOLEST SERPL-MCNC: 153 MG/DL (ref 0–199)
CO2 SERPL-SCNC: 29 MMOL/L (ref 21–32)
CREAT SERPL-MCNC: 0.8 MG/DL (ref 0.8–1.3)
DEPRECATED RDW RBC AUTO: 16 % (ref 12.4–15.4)
EOSINOPHIL # BLD: 0.1 K/UL (ref 0–0.6)
EOSINOPHIL NFR BLD: 1.6 %
EST. AVERAGE GLUCOSE BLD GHB EST-MCNC: 91.1 MG/DL
GFR SERPLBLD CREATININE-BSD FMLA CKD-EPI: >60 ML/MIN/{1.73_M2}
GLUCOSE SERPL-MCNC: 88 MG/DL (ref 70–99)
HBA1C MFR BLD: 4.8 %
HCT VFR BLD AUTO: 42.1 % (ref 40.5–52.5)
HDLC SERPL-MCNC: 57 MG/DL (ref 40–60)
HGB BLD-MCNC: 14.1 G/DL (ref 13.5–17.5)
LDLC SERPL CALC-MCNC: 80 MG/DL
LYMPHOCYTES # BLD: 1.3 K/UL (ref 1–5.1)
LYMPHOCYTES NFR BLD: 20.3 %
MCH RBC QN AUTO: 31.8 PG (ref 26–34)
MCHC RBC AUTO-ENTMCNC: 33.5 G/DL (ref 31–36)
MCV RBC AUTO: 94.9 FL (ref 80–100)
MONOCYTES # BLD: 0.4 K/UL (ref 0–1.3)
MONOCYTES NFR BLD: 6.3 %
NEUTROPHILS # BLD: 4.4 K/UL (ref 1.7–7.7)
NEUTROPHILS NFR BLD: 71.1 %
PLATELET # BLD AUTO: 197 K/UL (ref 135–450)
PMV BLD AUTO: 7.7 FL (ref 5–10.5)
POTASSIUM SERPL-SCNC: 4.5 MMOL/L (ref 3.5–5.1)
PROT SERPL-MCNC: 6.8 G/DL (ref 6.4–8.2)
RBC # BLD AUTO: 4.44 M/UL (ref 4.2–5.9)
SODIUM SERPL-SCNC: 141 MMOL/L (ref 136–145)
TRIGL SERPL-MCNC: 78 MG/DL (ref 0–150)
VLDLC SERPL CALC-MCNC: 16 MG/DL
WBC # BLD AUTO: 6.2 K/UL (ref 4–11)

## 2024-04-16 ENCOUNTER — OFFICE VISIT (OUTPATIENT)
Dept: INTERNAL MEDICINE CLINIC | Age: 74
End: 2024-04-16
Payer: MEDICARE

## 2024-04-16 VITALS
OXYGEN SATURATION: 98 % | SYSTOLIC BLOOD PRESSURE: 140 MMHG | DIASTOLIC BLOOD PRESSURE: 70 MMHG | BODY MASS INDEX: 25.99 KG/M2 | WEIGHT: 176 LBS | HEART RATE: 66 BPM | TEMPERATURE: 98.4 F

## 2024-04-16 DIAGNOSIS — I48.92 ATRIAL FLUTTER, UNSPECIFIED TYPE (HCC): ICD-10-CM

## 2024-04-16 DIAGNOSIS — Z95.2 S/P AORTIC VALVE REPLACEMENT: ICD-10-CM

## 2024-04-16 DIAGNOSIS — D68.00 VON WILLEBRAND'S DISEASE (HCC): ICD-10-CM

## 2024-04-16 DIAGNOSIS — E78.5 HYPERLIPIDEMIA, UNSPECIFIED HYPERLIPIDEMIA TYPE: ICD-10-CM

## 2024-04-16 DIAGNOSIS — I25.10 CORONARY ARTERY DISEASE INVOLVING NATIVE CORONARY ARTERY OF NATIVE HEART WITHOUT ANGINA PECTORIS: ICD-10-CM

## 2024-04-16 DIAGNOSIS — Z00.00 MEDICARE ANNUAL WELLNESS VISIT, SUBSEQUENT: Primary | ICD-10-CM

## 2024-04-16 DIAGNOSIS — Z13.1 SCREENING FOR DIABETES MELLITUS: ICD-10-CM

## 2024-04-16 DIAGNOSIS — I10 BENIGN ESSENTIAL HTN: ICD-10-CM

## 2024-04-16 DIAGNOSIS — F32.1 CURRENT MODERATE EPISODE OF MAJOR DEPRESSIVE DISORDER WITHOUT PRIOR EPISODE (HCC): ICD-10-CM

## 2024-04-16 DIAGNOSIS — R73.01 IMPAIRED FASTING GLUCOSE: ICD-10-CM

## 2024-04-16 DIAGNOSIS — I50.22 CHRONIC SYSTOLIC (CONGESTIVE) HEART FAILURE (HCC): ICD-10-CM

## 2024-04-16 DIAGNOSIS — Z12.5 SCREENING FOR PROSTATE CANCER: ICD-10-CM

## 2024-04-16 PROCEDURE — 3078F DIAST BP <80 MM HG: CPT | Performed by: INTERNAL MEDICINE

## 2024-04-16 PROCEDURE — 3077F SYST BP >= 140 MM HG: CPT | Performed by: INTERNAL MEDICINE

## 2024-04-16 PROCEDURE — 1123F ACP DISCUSS/DSCN MKR DOCD: CPT | Performed by: INTERNAL MEDICINE

## 2024-04-16 PROCEDURE — G0439 PPPS, SUBSEQ VISIT: HCPCS | Performed by: INTERNAL MEDICINE

## 2024-04-16 RX ORDER — AMLODIPINE BESYLATE 10 MG/1
5 TABLET ORAL DAILY
Qty: 90 TABLET | Refills: 3 | Status: SHIPPED | OUTPATIENT
Start: 2024-04-16

## 2024-04-16 SDOH — ECONOMIC STABILITY: FOOD INSECURITY: WITHIN THE PAST 12 MONTHS, THE FOOD YOU BOUGHT JUST DIDN'T LAST AND YOU DIDN'T HAVE MONEY TO GET MORE.: NEVER TRUE

## 2024-04-16 SDOH — ECONOMIC STABILITY: INCOME INSECURITY: HOW HARD IS IT FOR YOU TO PAY FOR THE VERY BASICS LIKE FOOD, HOUSING, MEDICAL CARE, AND HEATING?: NOT HARD AT ALL

## 2024-04-16 ASSESSMENT — PATIENT HEALTH QUESTIONNAIRE - PHQ9
1. LITTLE INTEREST OR PLEASURE IN DOING THINGS: SEVERAL DAYS
SUM OF ALL RESPONSES TO PHQ9 QUESTIONS 1 & 2: 2
6. FEELING BAD ABOUT YOURSELF - OR THAT YOU ARE A FAILURE OR HAVE LET YOURSELF OR YOUR FAMILY DOWN: SEVERAL DAYS
SUM OF ALL RESPONSES TO PHQ QUESTIONS 1-9: 3
SUM OF ALL RESPONSES TO PHQ QUESTIONS 1-9: 3
5. POOR APPETITE OR OVEREATING: NOT AT ALL
2. FEELING DOWN, DEPRESSED OR HOPELESS: SEVERAL DAYS
4. FEELING TIRED OR HAVING LITTLE ENERGY: NOT AT ALL
10. IF YOU CHECKED OFF ANY PROBLEMS, HOW DIFFICULT HAVE THESE PROBLEMS MADE IT FOR YOU TO DO YOUR WORK, TAKE CARE OF THINGS AT HOME, OR GET ALONG WITH OTHER PEOPLE: SOMEWHAT DIFFICULT
SUM OF ALL RESPONSES TO PHQ QUESTIONS 1-9: 3
SUM OF ALL RESPONSES TO PHQ QUESTIONS 1-9: 3
3. TROUBLE FALLING OR STAYING ASLEEP: NOT AT ALL
8. MOVING OR SPEAKING SO SLOWLY THAT OTHER PEOPLE COULD HAVE NOTICED. OR THE OPPOSITE, BEING SO FIGETY OR RESTLESS THAT YOU HAVE BEEN MOVING AROUND A LOT MORE THAN USUAL: NOT AT ALL
9. THOUGHTS THAT YOU WOULD BE BETTER OFF DEAD, OR OF HURTING YOURSELF: NOT AT ALL
7. TROUBLE CONCENTRATING ON THINGS, SUCH AS READING THE NEWSPAPER OR WATCHING TELEVISION: NOT AT ALL

## 2024-04-16 ASSESSMENT — LIFESTYLE VARIABLES
HOW OFTEN DO YOU HAVE A DRINK CONTAINING ALCOHOL: 2-4 TIMES A MONTH
HOW MANY STANDARD DRINKS CONTAINING ALCOHOL DO YOU HAVE ON A TYPICAL DAY: 1 OR 2

## 2024-04-16 NOTE — PATIENT INSTRUCTIONS
also a good idea to know your test results and keep a list of the medicines you take.  How can you care for yourself at home?  Diet    Use less salt when you cook and eat. This helps lower your blood pressure. Taste food before salting. Add only a little salt when you think you need it. With time, your taste buds will adjust to less salt.     Eat fewer snack items, fast foods, canned soups, and other high-salt, high-fat, processed foods.     Read food labels and try to avoid saturated and trans fats. They increase your risk of heart disease by raising cholesterol levels.     Limit the amount of solid fat--butter, margarine, and shortening--you eat. Use olive, peanut, or canola oil when you cook. Bake, broil, and steam foods instead of frying them.     Eat a variety of fruit and vegetables every day. Dark green, deep orange, red, or yellow fruits and vegetables are especially good for you. Examples include spinach, carrots, peaches, and berries.     Foods high in fiber can reduce your cholesterol and provide important vitamins and minerals. High-fiber foods include whole-grain cereals and breads, oatmeal, beans, brown rice, citrus fruits, and apples.     Eat lean proteins. Heart-healthy proteins include seafood, lean meats and poultry, eggs, beans, peas, nuts, seeds, and soy products.     Limit drinks and foods with added sugar. These include candy, desserts, and soda pop.   Heart-healthy lifestyle    If your doctor recommends it, get more exercise. For many people, walking is a good choice. Or you may want to swim, bike, or do other activities. Bit by bit, increase the time you're active every day. Try for at least 30 minutes on most days of the week.     Try to quit or cut back on using tobacco and other nicotine products. This includes smoking and vaping. If you need help quitting, talk to your doctor about stop-smoking programs and medicines. These can increase your chances of quitting for good. Quitting is one

## 2024-04-16 NOTE — PROGRESS NOTES
[facility]  Internal Medicine  Patient Encounter  Albert Lopez D.O., [facility]       Medicare Annual Wellness Visit  [place]  4/16/2024    Subjective [place] Cerda is here for Medicare AWV (Patient presents for AWV. Having problem with L knee - needs Rx to hold him over to L TKR. Also still experiencing \"light headedness\". )      Medical/Surgical Histories     Past Medical History:   Diagnosis Date    Acute ischemic stroke (HCC) 10/24/2014    Right    Anemia     Aortic insufficiency     Arthritis     rt knee    Bilateral sensorineural hearing loss 05/09/2019    CAD (coronary artery disease) 05/29/2014    LAD, RCA, Diag - cariothoracic surgery    Cataracts, bilateral 07/2020    Chronic systolic (congestive) heart failure 2/15/2023    Colon polyps 02/03/2020    Tubular adenoma, Dr. Almeida    Depression     [place] (hard of hearing)     hermes-lateral hearing aids    HTN (hypertension)     Hyperlipidemia     Hypokalemia     Jaw dislocation     Von Willebrand disease (HCC)     Walking pneumonia           Past Surgical History:   Procedure Laterality Date    AORTIC VALVE REPLACEMENT  05/19/2023    COLONOSCOPY  02/03/2020    Dr. Almeida    COLONOSCOPY N/A 02/03/2020    COLONOSCOPY POLYPECTOMY SNARE/COLD BIOPSY performed by Tre Almeida MD at [facility] ENDOSCOPY    CORONARY ARTERY BYPASS GRAFT  2014    6/2/2014 by Dr. Mariangel Freeman    HERNIA REPAIR Right 06/10/2022    RIGHT INGUINAL HERNIA REPAIR WITH MESH performed by Skip Huston MD at [facility] OR    INTRACAPSULAR CATARACT EXTRACTION Right 07/31/2020    Phacoemulsification with intraocular lens implant performed by Med Ro MD at [facility]    INTRACAPSULAR CATARACT EXTRACTION Left 08/06/2020    Phacoemulsification with intraocular lens implant performed by Med Ro MD at [facility]    JOINT REPLACEMENT Right 10/21/2014    right total knee replacement    TONSILLECTOMY AND ADENOIDECTOMY  1955

## 2024-04-18 DIAGNOSIS — Z13.1 SCREENING FOR DIABETES MELLITUS: ICD-10-CM

## 2024-04-18 DIAGNOSIS — R73.01 IMPAIRED FASTING GLUCOSE: ICD-10-CM

## 2024-04-18 DIAGNOSIS — I10 BENIGN ESSENTIAL HTN: ICD-10-CM

## 2024-04-18 DIAGNOSIS — Z12.5 SCREENING FOR PROSTATE CANCER: ICD-10-CM

## 2024-04-18 DIAGNOSIS — E78.5 HYPERLIPIDEMIA, UNSPECIFIED HYPERLIPIDEMIA TYPE: ICD-10-CM

## 2024-04-18 DIAGNOSIS — I25.10 CORONARY ARTERY DISEASE INVOLVING NATIVE CORONARY ARTERY OF NATIVE HEART WITHOUT ANGINA PECTORIS: ICD-10-CM

## 2024-04-18 DIAGNOSIS — Z00.00 MEDICARE ANNUAL WELLNESS VISIT, SUBSEQUENT: ICD-10-CM

## 2024-04-18 LAB
ALBUMIN SERPL-MCNC: 4.3 G/DL (ref 3.4–5)
ALBUMIN/GLOB SERPL: 1.9 {RATIO} (ref 1.1–2.2)
ALP SERPL-CCNC: 79 U/L (ref 40–129)
ALT SERPL-CCNC: 11 U/L (ref 10–40)
ANION GAP SERPL CALCULATED.3IONS-SCNC: 9 MMOL/L (ref 3–16)
AST SERPL-CCNC: 14 U/L (ref 15–37)
BASOPHILS # BLD: 0 K/UL (ref 0–0.2)
BASOPHILS NFR BLD: 0.7 %
BILIRUB SERPL-MCNC: 0.4 MG/DL (ref 0–1)
BUN SERPL-MCNC: 13 MG/DL (ref 7–20)
CALCIUM SERPL-MCNC: 9.1 MG/DL (ref 8.3–10.6)
CHLORIDE SERPL-SCNC: 107 MMOL/L (ref 99–110)
CHOLEST SERPL-MCNC: 132 MG/DL (ref 0–199)
CO2 SERPL-SCNC: 25 MMOL/L (ref 21–32)
CREAT SERPL-MCNC: 0.7 MG/DL (ref 0.8–1.3)
DEPRECATED RDW RBC AUTO: 13.6 % (ref 12.4–15.4)
EOSINOPHIL # BLD: 0.2 K/UL (ref 0–0.6)
EOSINOPHIL NFR BLD: 2.9 %
GFR SERPLBLD CREATININE-BSD FMLA CKD-EPI: >90 ML/MIN/{1.73_M2}
GLUCOSE SERPL-MCNC: 90 MG/DL (ref 70–99)
HCT VFR BLD AUTO: 42.8 % (ref 40.5–52.5)
HDLC SERPL-MCNC: 58 MG/DL (ref 40–60)
HGB BLD-MCNC: 14.5 G/DL (ref 13.5–17.5)
LDLC SERPL CALC-MCNC: 65 MG/DL
LYMPHOCYTES # BLD: 1.7 K/UL (ref 1–5.1)
LYMPHOCYTES NFR BLD: 25.1 %
MCH RBC QN AUTO: 32.1 PG (ref 26–34)
MCHC RBC AUTO-ENTMCNC: 33.8 G/DL (ref 31–36)
MCV RBC AUTO: 95.1 FL (ref 80–100)
MONOCYTES # BLD: 0.5 K/UL (ref 0–1.3)
MONOCYTES NFR BLD: 7.9 %
NEUTROPHILS # BLD: 4.3 K/UL (ref 1.7–7.7)
NEUTROPHILS NFR BLD: 63.4 %
PLATELET # BLD AUTO: 206 K/UL (ref 135–450)
PMV BLD AUTO: 8.1 FL (ref 5–10.5)
POTASSIUM SERPL-SCNC: 4.8 MMOL/L (ref 3.5–5.1)
PROT SERPL-MCNC: 6.6 G/DL (ref 6.4–8.2)
PSA SERPL DL<=0.01 NG/ML-MCNC: 2.11 NG/ML (ref 0–4)
RBC # BLD AUTO: 4.5 M/UL (ref 4.2–5.9)
SODIUM SERPL-SCNC: 141 MMOL/L (ref 136–145)
TRIGL SERPL-MCNC: 43 MG/DL (ref 0–150)
TSH SERPL DL<=0.005 MIU/L-ACNC: 1.19 UIU/ML (ref 0.27–4.2)
VLDLC SERPL CALC-MCNC: 9 MG/DL
WBC # BLD AUTO: 6.8 K/UL (ref 4–11)

## 2024-04-19 LAB
EST. AVERAGE GLUCOSE BLD GHB EST-MCNC: 91.1 MG/DL
HBA1C MFR BLD: 4.8 %

## 2024-06-12 DIAGNOSIS — E78.5 HYPERLIPIDEMIA, UNSPECIFIED HYPERLIPIDEMIA TYPE: ICD-10-CM

## 2024-06-13 RX ORDER — ATORVASTATIN CALCIUM 80 MG/1
80 TABLET, FILM COATED ORAL DAILY
Qty: 90 TABLET | Refills: 0 | OUTPATIENT
Start: 2024-06-13

## 2024-06-13 RX ORDER — METOPROLOL SUCCINATE 50 MG/1
50 TABLET, EXTENDED RELEASE ORAL DAILY
Qty: 90 TABLET | Refills: 0 | OUTPATIENT
Start: 2024-06-13

## 2024-07-23 DIAGNOSIS — E78.5 HYPERLIPIDEMIA, UNSPECIFIED HYPERLIPIDEMIA TYPE: ICD-10-CM

## 2024-07-23 RX ORDER — METOPROLOL SUCCINATE 50 MG/1
50 TABLET, EXTENDED RELEASE ORAL DAILY
Qty: 90 TABLET | Refills: 3 | Status: SHIPPED | OUTPATIENT
Start: 2024-07-23

## 2024-07-23 RX ORDER — ATORVASTATIN CALCIUM 80 MG/1
80 TABLET, FILM COATED ORAL DAILY
Qty: 90 TABLET | Refills: 3 | Status: SHIPPED | OUTPATIENT
Start: 2024-07-23

## 2024-09-23 ENCOUNTER — TELEPHONE (OUTPATIENT)
Dept: INTERNAL MEDICINE CLINIC | Age: 74
End: 2024-09-23

## 2024-09-27 ENCOUNTER — OFFICE VISIT (OUTPATIENT)
Dept: INTERNAL MEDICINE CLINIC | Age: 74
End: 2024-09-27
Payer: MEDICARE

## 2024-09-27 DIAGNOSIS — M17.12 PRIMARY OSTEOARTHRITIS OF LEFT KNEE: Primary | ICD-10-CM

## 2024-09-27 DIAGNOSIS — L98.9 SKIN LESION OF FACE: ICD-10-CM

## 2024-09-27 PROCEDURE — 99213 OFFICE O/P EST LOW 20 MIN: CPT | Performed by: INTERNAL MEDICINE

## 2024-09-27 PROCEDURE — 3079F DIAST BP 80-89 MM HG: CPT | Performed by: INTERNAL MEDICINE

## 2024-09-27 PROCEDURE — 3075F SYST BP GE 130 - 139MM HG: CPT | Performed by: INTERNAL MEDICINE

## 2024-09-27 PROCEDURE — 1123F ACP DISCUSS/DSCN MKR DOCD: CPT | Performed by: INTERNAL MEDICINE

## 2024-09-27 SDOH — ECONOMIC STABILITY: FOOD INSECURITY: WITHIN THE PAST 12 MONTHS, YOU WORRIED THAT YOUR FOOD WOULD RUN OUT BEFORE YOU GOT MONEY TO BUY MORE.: NEVER TRUE

## 2024-09-27 SDOH — ECONOMIC STABILITY: FOOD INSECURITY: WITHIN THE PAST 12 MONTHS, THE FOOD YOU BOUGHT JUST DIDN'T LAST AND YOU DIDN'T HAVE MONEY TO GET MORE.: NEVER TRUE

## 2024-09-27 SDOH — ECONOMIC STABILITY: INCOME INSECURITY: HOW HARD IS IT FOR YOU TO PAY FOR THE VERY BASICS LIKE FOOD, HOUSING, MEDICAL CARE, AND HEATING?: NOT HARD AT ALL

## 2024-09-29 VITALS
HEART RATE: 71 BPM | BODY MASS INDEX: 26.2 KG/M2 | SYSTOLIC BLOOD PRESSURE: 130 MMHG | WEIGHT: 177.4 LBS | DIASTOLIC BLOOD PRESSURE: 88 MMHG

## 2024-10-14 ENCOUNTER — OFFICE VISIT (OUTPATIENT)
Dept: INTERNAL MEDICINE CLINIC | Age: 74
End: 2024-10-14
Payer: MEDICARE

## 2024-10-14 VITALS
SYSTOLIC BLOOD PRESSURE: 122 MMHG | BODY MASS INDEX: 26.63 KG/M2 | DIASTOLIC BLOOD PRESSURE: 84 MMHG | WEIGHT: 179.8 LBS | HEIGHT: 69 IN | HEART RATE: 83 BPM | RESPIRATION RATE: 16 BRPM | OXYGEN SATURATION: 98 % | TEMPERATURE: 97.3 F

## 2024-10-14 DIAGNOSIS — I10 BENIGN ESSENTIAL HTN: Primary | ICD-10-CM

## 2024-10-14 DIAGNOSIS — M17.12 PRIMARY OSTEOARTHRITIS OF LEFT KNEE: ICD-10-CM

## 2024-10-14 DIAGNOSIS — I10 BENIGN ESSENTIAL HTN: ICD-10-CM

## 2024-10-14 DIAGNOSIS — D68.00 VON WILLEBRAND'S DISEASE (HCC): ICD-10-CM

## 2024-10-14 DIAGNOSIS — R73.01 IMPAIRED FASTING GLUCOSE: ICD-10-CM

## 2024-10-14 DIAGNOSIS — E78.5 HYPERLIPIDEMIA, UNSPECIFIED HYPERLIPIDEMIA TYPE: ICD-10-CM

## 2024-10-14 DIAGNOSIS — I25.10 CORONARY ARTERY DISEASE INVOLVING NATIVE CORONARY ARTERY OF NATIVE HEART WITHOUT ANGINA PECTORIS: ICD-10-CM

## 2024-10-14 DIAGNOSIS — H90.3 SENSORINEURAL HEARING LOSS (SNHL) OF BOTH EARS: ICD-10-CM

## 2024-10-14 DIAGNOSIS — I50.22 CHRONIC SYSTOLIC (CONGESTIVE) HEART FAILURE (HCC): ICD-10-CM

## 2024-10-14 PROCEDURE — G2211 COMPLEX E/M VISIT ADD ON: HCPCS | Performed by: INTERNAL MEDICINE

## 2024-10-14 PROCEDURE — 99214 OFFICE O/P EST MOD 30 MIN: CPT | Performed by: INTERNAL MEDICINE

## 2024-10-14 PROCEDURE — 3074F SYST BP LT 130 MM HG: CPT | Performed by: INTERNAL MEDICINE

## 2024-10-14 PROCEDURE — 3079F DIAST BP 80-89 MM HG: CPT | Performed by: INTERNAL MEDICINE

## 2024-10-14 PROCEDURE — 1123F ACP DISCUSS/DSCN MKR DOCD: CPT | Performed by: INTERNAL MEDICINE

## 2024-10-14 NOTE — PROGRESS NOTES
by mouth daily          Review of Systems - As per HPI      OBJECTIVE:  Vitals:    10/14/24 1043   BP: 122/84   Site: Right Upper Arm   Position: Sitting   Cuff Size: Medium Adult   Pulse: 83   Resp: 16   Temp: 97.3 °F (36.3 °C)   TempSrc: Temporal   SpO2: 98%   Weight: 81.6 kg (179 lb 12.8 oz)   Height: 1.75 m (5' 8.9\")       Body mass index is 26.63 kg/m².     Wt Readings from Last 3 Encounters:   10/14/24 81.6 kg (179 lb 12.8 oz)   09/27/24 80.5 kg (177 lb 6.4 oz)   04/16/24 79.8 kg (176 lb)     BP Readings from Last 3 Encounters:   10/14/24 122/84   09/27/24 130/88   04/16/24 (!) 140/70        GEN: NAD, A&O, Non-toxic  HEENT: NC/AT, SALLY, EOMI, Oral cavity Clear, very hard of hearing.  Hearing aids in place  NECK: Supple.  No thyromegaly.  No JVD  LYMPH: No C/SC nodes.  CV: Regular rhythm.  No ectopy.  Rate normal.    PULM: CTA  EXT:  No edema.    GI: Abdomen is soft, NT, BS+, No hepatomegaly.  No masses.  NEURO: No focal or lateralizing deficits.  VASC:  No carotid bruits.  Pedal pulses symmetric, 2+  SKIN: No rashes or lesions of concern.          ASSESSMENT/PLAN:    1. Benign essential HTN  Blood pressure is well-controlled at 122/84  Continue Toprol-XL 50 mg daily, lisinopril 40 mg daily  Amlodipine 10 mg one half tab daily (5 mg)  Check electrolytes and renal function  - CBC with Auto Differential; Future  - Comprehensive Metabolic Panel; Future  - Lipid Panel; Future    2. Impaired fasting glucose  Stability and control are uncertain.  Continue to monitor for signs of glucose toxicity  Check A1c to ensure he has not transitioned to type 2 diabetes  Continue regular exercise as he is doing  Continue a sensible low simple sugar diet  - Comprehensive Metabolic Panel; Future  - Hemoglobin A1C; Future    3. Coronary artery disease involving native coronary artery of native heart without angina pectoris  Asymptomatic  Continue to monitor for signs of angina and anginal equivalents  Continue statin therapy,

## 2024-10-15 LAB
ALBUMIN SERPL-MCNC: 4.4 G/DL (ref 3.4–5)
ALBUMIN/GLOB SERPL: 1.8 {RATIO} (ref 1.1–2.2)
ALP SERPL-CCNC: 71 U/L (ref 40–129)
ALT SERPL-CCNC: 19 U/L (ref 10–40)
ANION GAP SERPL CALCULATED.3IONS-SCNC: 12 MMOL/L (ref 3–16)
AST SERPL-CCNC: 23 U/L (ref 15–37)
BASOPHILS # BLD: 0.1 K/UL (ref 0–0.2)
BASOPHILS NFR BLD: 1.1 %
BILIRUB SERPL-MCNC: 0.6 MG/DL (ref 0–1)
BUN SERPL-MCNC: 11 MG/DL (ref 7–20)
CALCIUM SERPL-MCNC: 9.5 MG/DL (ref 8.3–10.6)
CHLORIDE SERPL-SCNC: 107 MMOL/L (ref 99–110)
CHOLEST SERPL-MCNC: 158 MG/DL (ref 0–199)
CO2 SERPL-SCNC: 23 MMOL/L (ref 21–32)
CREAT SERPL-MCNC: 0.8 MG/DL (ref 0.8–1.3)
DEPRECATED RDW RBC AUTO: 13.6 % (ref 12.4–15.4)
EOSINOPHIL # BLD: 0.2 K/UL (ref 0–0.6)
EOSINOPHIL NFR BLD: 2.6 %
EST. AVERAGE GLUCOSE BLD GHB EST-MCNC: 91.1 MG/DL
GFR SERPLBLD CREATININE-BSD FMLA CKD-EPI: >90 ML/MIN/{1.73_M2}
GLUCOSE SERPL-MCNC: 89 MG/DL (ref 70–99)
HBA1C MFR BLD: 4.8 %
HCT VFR BLD AUTO: 45.3 % (ref 40.5–52.5)
HDLC SERPL-MCNC: 58 MG/DL (ref 40–60)
HGB BLD-MCNC: 15.5 G/DL (ref 13.5–17.5)
LDLC SERPL CALC-MCNC: 83 MG/DL
LYMPHOCYTES # BLD: 1.4 K/UL (ref 1–5.1)
LYMPHOCYTES NFR BLD: 21.1 %
MCH RBC QN AUTO: 33.3 PG (ref 26–34)
MCHC RBC AUTO-ENTMCNC: 34.1 G/DL (ref 31–36)
MCV RBC AUTO: 97.5 FL (ref 80–100)
MONOCYTES # BLD: 0.5 K/UL (ref 0–1.3)
MONOCYTES NFR BLD: 6.7 %
NEUTROPHILS # BLD: 4.7 K/UL (ref 1.7–7.7)
NEUTROPHILS NFR BLD: 68.5 %
PLATELET # BLD AUTO: 218 K/UL (ref 135–450)
PMV BLD AUTO: 8.5 FL (ref 5–10.5)
POTASSIUM SERPL-SCNC: 4.5 MMOL/L (ref 3.5–5.1)
PROT SERPL-MCNC: 6.8 G/DL (ref 6.4–8.2)
RBC # BLD AUTO: 4.65 M/UL (ref 4.2–5.9)
SODIUM SERPL-SCNC: 142 MMOL/L (ref 136–145)
TRIGL SERPL-MCNC: 86 MG/DL (ref 0–150)
VLDLC SERPL CALC-MCNC: 17 MG/DL
WBC # BLD AUTO: 6.8 K/UL (ref 4–11)

## 2024-10-20 DIAGNOSIS — E78.5 HYPERLIPIDEMIA, UNSPECIFIED HYPERLIPIDEMIA TYPE: ICD-10-CM

## 2024-10-20 DIAGNOSIS — I25.10 CORONARY ARTERY DISEASE INVOLVING NATIVE CORONARY ARTERY OF NATIVE HEART WITHOUT ANGINA PECTORIS: Primary | ICD-10-CM

## 2024-10-20 RX ORDER — EZETIMIBE 10 MG/1
10 TABLET ORAL DAILY
Qty: 90 TABLET | Refills: 3 | Status: SHIPPED | OUTPATIENT
Start: 2024-10-20

## 2025-01-15 ENCOUNTER — OFFICE VISIT (OUTPATIENT)
Dept: INTERNAL MEDICINE CLINIC | Age: 75
End: 2025-01-15

## 2025-01-15 VITALS
SYSTOLIC BLOOD PRESSURE: 126 MMHG | BODY MASS INDEX: 25.48 KG/M2 | TEMPERATURE: 97.3 F | OXYGEN SATURATION: 99 % | DIASTOLIC BLOOD PRESSURE: 74 MMHG | HEART RATE: 66 BPM | WEIGHT: 172 LBS

## 2025-01-15 DIAGNOSIS — I25.10 CORONARY ARTERY DISEASE INVOLVING NATIVE CORONARY ARTERY OF NATIVE HEART WITHOUT ANGINA PECTORIS: ICD-10-CM

## 2025-01-15 DIAGNOSIS — Z01.818 PREOP EXAM FOR INTERNAL MEDICINE: Primary | ICD-10-CM

## 2025-01-15 DIAGNOSIS — R73.01 IMPAIRED FASTING GLUCOSE: ICD-10-CM

## 2025-01-15 DIAGNOSIS — H90.3 SENSORINEURAL HEARING LOSS (SNHL) OF BOTH EARS: ICD-10-CM

## 2025-01-15 DIAGNOSIS — Z01.818 PREOP EXAM FOR INTERNAL MEDICINE: ICD-10-CM

## 2025-01-15 DIAGNOSIS — M17.12 PRIMARY OSTEOARTHRITIS OF LEFT KNEE: ICD-10-CM

## 2025-01-15 DIAGNOSIS — I10 BENIGN ESSENTIAL HTN: ICD-10-CM

## 2025-01-15 DIAGNOSIS — I48.92 ATRIAL FLUTTER, UNSPECIFIED TYPE (HCC): ICD-10-CM

## 2025-01-15 DIAGNOSIS — I50.22 CHRONIC SYSTOLIC (CONGESTIVE) HEART FAILURE (HCC): ICD-10-CM

## 2025-01-15 DIAGNOSIS — I35.1 NONRHEUMATIC AORTIC VALVE INSUFFICIENCY: ICD-10-CM

## 2025-01-15 DIAGNOSIS — F32.1 CURRENT MODERATE EPISODE OF MAJOR DEPRESSIVE DISORDER WITHOUT PRIOR EPISODE (HCC): ICD-10-CM

## 2025-01-15 DIAGNOSIS — Z95.2 S/P AORTIC VALVE REPLACEMENT: ICD-10-CM

## 2025-01-15 DIAGNOSIS — D68.00 VON WILLEBRAND'S DISEASE (HCC): ICD-10-CM

## 2025-01-15 RX ORDER — AMLODIPINE BESYLATE 10 MG/1
10 TABLET ORAL DAILY
Qty: 90 TABLET | Refills: 3 | Status: SHIPPED | OUTPATIENT
Start: 2025-01-15

## 2025-01-15 RX ORDER — LOSARTAN POTASSIUM 100 MG/1
100 TABLET ORAL DAILY
COMMUNITY

## 2025-01-15 SDOH — ECONOMIC STABILITY: FOOD INSECURITY: WITHIN THE PAST 12 MONTHS, THE FOOD YOU BOUGHT JUST DIDN'T LAST AND YOU DIDN'T HAVE MONEY TO GET MORE.: NEVER TRUE

## 2025-01-15 SDOH — ECONOMIC STABILITY: FOOD INSECURITY: WITHIN THE PAST 12 MONTHS, YOU WORRIED THAT YOUR FOOD WOULD RUN OUT BEFORE YOU GOT MONEY TO BUY MORE.: NEVER TRUE

## 2025-01-15 ASSESSMENT — PATIENT HEALTH QUESTIONNAIRE - PHQ9
7. TROUBLE CONCENTRATING ON THINGS, SUCH AS READING THE NEWSPAPER OR WATCHING TELEVISION: NOT AT ALL
SUM OF ALL RESPONSES TO PHQ QUESTIONS 1-9: 1
SUM OF ALL RESPONSES TO PHQ QUESTIONS 1-9: 2
6. FEELING BAD ABOUT YOURSELF - OR THAT YOU ARE A FAILURE OR HAVE LET YOURSELF OR YOUR FAMILY DOWN: SEVERAL DAYS
3. TROUBLE FALLING OR STAYING ASLEEP: NOT AT ALL
8. MOVING OR SPEAKING SO SLOWLY THAT OTHER PEOPLE COULD HAVE NOTICED. OR THE OPPOSITE, BEING SO FIGETY OR RESTLESS THAT YOU HAVE BEEN MOVING AROUND A LOT MORE THAN USUAL: NOT AT ALL
SUM OF ALL RESPONSES TO PHQ9 QUESTIONS 1 & 2: 1
SUM OF ALL RESPONSES TO PHQ QUESTIONS 1-9: 2
SUM OF ALL RESPONSES TO PHQ QUESTIONS 1-9: 2
10. IF YOU CHECKED OFF ANY PROBLEMS, HOW DIFFICULT HAVE THESE PROBLEMS MADE IT FOR YOU TO DO YOUR WORK, TAKE CARE OF THINGS AT HOME, OR GET ALONG WITH OTHER PEOPLE: NOT DIFFICULT AT ALL
5. POOR APPETITE OR OVEREATING: NOT AT ALL
SUM OF ALL RESPONSES TO PHQ QUESTIONS 1-9: 1
SUM OF ALL RESPONSES TO PHQ QUESTIONS 1-9: 1
4. FEELING TIRED OR HAVING LITTLE ENERGY: NOT AT ALL
2. FEELING DOWN, DEPRESSED OR HOPELESS: SEVERAL DAYS
SUM OF ALL RESPONSES TO PHQ QUESTIONS 1-9: 2
1. LITTLE INTEREST OR PLEASURE IN DOING THINGS: NOT AT ALL
1. LITTLE INTEREST OR PLEASURE IN DOING THINGS: SEVERAL DAYS
SUM OF ALL RESPONSES TO PHQ QUESTIONS 1-9: 1
9. THOUGHTS THAT YOU WOULD BE BETTER OFF DEAD, OR OF HURTING YOURSELF: NOT AT ALL

## 2025-01-15 NOTE — PATIENT INSTRUCTIONS
Stop Aspirin 1 week prior to surgery    Avoid NSAID's (Motrin, Aleve, Advil, Ibuprofen),  vitamin, herbal supplements and fish oil 1 week prior to procedure

## 2025-01-15 NOTE — PROGRESS NOTES
[facility]  Internal Medicine  Patient Encounter  Albert Lopez D.O., [facility]          Chief Complaint   Patient presents with    Pre-op Exam     Patient is having L TKR under . Dr. Hartman @ [facility] on 01/28/2024.        HPI-- 74 y.o. male presents today for a preoperative physical.  Pt diagnosed with severe left osteoarthritis of the left knee.  Pt states he has had chronic pain in the left knee.  He states the pain has been progressively worsening which is affecting his ADL's especially his ability to exercise.  He has increased pain with prolonged standing, navigating steps, prolonged walking.  Left knee x-ray showed \"advanced osteoarthritis with complete loss of joint space and marginal osteophytes.  Patient is now scheduled for a left total knee replacement.\"  Patient is scheduled for a left total knee replacement.  I have been asked to see patient for pre-operative risk assessment and clearance.   Surgery is scheduled for 1/28/2025.  Surgery will be performed by Dr. Hartman at [facility]      Lab completed 12/31/2024.  Reviewed.      CAD/status post severe aortic regurgitation and aortic valve replacement/atrial flutter/CHF/Aortic aneurysm Ascending--he denies any chest pain, shortness of breath, orthopnea, or edema.  Patient has a history of CABG in 2014.  He is under the care of cardiology. Patient underwent bovine aortic valve replacement on 5/19/2023.  He was admitted a day before for Humate-P infusion.  Postop probably he developed a flutter which required cardioversion. He exercises at the gym-- Nustep, bicycle, basketball shooting, walking without CP, SOB, palpitations.  He has good exercise tolerance.    Pt was seen by cardiology, Dr. Lewis on 12/16/2024.  He was cleared by cardiology.    Echo 6/18/2024-- EF 55%, Bioprosthetic valve well seated, Ascending aorta 4.4 cm, RV dilation, SHEREE, PASP 300 mm Hg.        History of CVA--patient suffered an ischemic

## 2025-01-16 LAB
ANION GAP SERPL CALCULATED.3IONS-SCNC: 10 MMOL/L (ref 3–16)
BACTERIA URNS QL MICRO: NORMAL /HPF
BASOPHILS # BLD: 0 K/UL (ref 0–0.2)
BASOPHILS NFR BLD: 0.5 %
BILIRUB UR QL STRIP.AUTO: NEGATIVE
BUN SERPL-MCNC: 12 MG/DL (ref 7–20)
CALCIUM SERPL-MCNC: 9.9 MG/DL (ref 8.3–10.6)
CHLORIDE SERPL-SCNC: 104 MMOL/L (ref 99–110)
CLARITY UR: CLEAR
CO2 SERPL-SCNC: 28 MMOL/L (ref 21–32)
COLOR UR: YELLOW
CREAT SERPL-MCNC: 0.8 MG/DL (ref 0.8–1.3)
DEPRECATED RDW RBC AUTO: 14 % (ref 12.4–15.4)
EOSINOPHIL # BLD: 0.2 K/UL (ref 0–0.6)
EOSINOPHIL NFR BLD: 4.1 %
EPI CELLS #/AREA URNS AUTO: 0 /HPF (ref 0–5)
GFR SERPLBLD CREATININE-BSD FMLA CKD-EPI: >90 ML/MIN/{1.73_M2}
GLUCOSE SERPL-MCNC: 109 MG/DL (ref 70–99)
GLUCOSE UR STRIP.AUTO-MCNC: NEGATIVE MG/DL
HCT VFR BLD AUTO: 45.9 % (ref 40.5–52.5)
HGB BLD-MCNC: 15.6 G/DL (ref 13.5–17.5)
HGB UR QL STRIP.AUTO: NEGATIVE
HYALINE CASTS #/AREA URNS AUTO: 0 /LPF (ref 0–8)
KETONES UR STRIP.AUTO-MCNC: ABNORMAL MG/DL
LEUKOCYTE ESTERASE UR QL STRIP.AUTO: ABNORMAL
LYMPHOCYTES # BLD: 1.1 K/UL (ref 1–5.1)
LYMPHOCYTES NFR BLD: 20.4 %
MCH RBC QN AUTO: 32.8 PG (ref 26–34)
MCHC RBC AUTO-ENTMCNC: 33.9 G/DL (ref 31–36)
MCV RBC AUTO: 96.9 FL (ref 80–100)
MONOCYTES # BLD: 0.4 K/UL (ref 0–1.3)
MONOCYTES NFR BLD: 7.1 %
NEUTROPHILS # BLD: 3.8 K/UL (ref 1.7–7.7)
NEUTROPHILS NFR BLD: 67.9 %
NITRITE UR QL STRIP.AUTO: NEGATIVE
PH UR STRIP.AUTO: 5.5 [PH] (ref 5–8)
PLATELET # BLD AUTO: 200 K/UL (ref 135–450)
PMV BLD AUTO: 8.4 FL (ref 5–10.5)
POTASSIUM SERPL-SCNC: 4.3 MMOL/L (ref 3.5–5.1)
PROT UR STRIP.AUTO-MCNC: NEGATIVE MG/DL
RBC # BLD AUTO: 4.74 M/UL (ref 4.2–5.9)
RBC CLUMPS #/AREA URNS AUTO: 2 /HPF (ref 0–4)
SODIUM SERPL-SCNC: 142 MMOL/L (ref 136–145)
SP GR UR STRIP.AUTO: 1.02 (ref 1–1.03)
UA COMPLETE W REFLEX CULTURE PNL UR: ABNORMAL
UA DIPSTICK W REFLEX MICRO PNL UR: YES
URN SPEC COLLECT METH UR: ABNORMAL
UROBILINOGEN UR STRIP-ACNC: 0.2 E.U./DL
WBC # BLD AUTO: 5.6 K/UL (ref 4–11)
WBC #/AREA URNS AUTO: 1 /HPF (ref 0–5)

## 2025-01-17 LAB
EST. AVERAGE GLUCOSE BLD GHB EST-MCNC: 85.3 MG/DL
HBA1C MFR BLD: 4.6 %
MRSA SPEC QL CULT: NORMAL

## 2025-01-31 ENCOUNTER — TELEPHONE (OUTPATIENT)
Dept: INTERNAL MEDICINE CLINIC | Age: 75
End: 2025-01-31

## 2025-01-31 NOTE — TELEPHONE ENCOUNTER
Okay to start MiraLAX powder, 17 g (1 capful) mixed in 8 ounces of water every day along with senna S take 2 tabs twice a day.  Call if diarrhea.  If abdominal pain, nausea, or vomiting worsen, go to the emergency room

## 2025-01-31 NOTE — TELEPHONE ENCOUNTER
I called and spoke with son, to relay provider advice.   In speaking with him, we decided it would be good to send the message to his dad's mychart, so he has reference to it there.     I did send the message to pt eleno.     Son voices understanding and appreciation.

## 2025-01-31 NOTE — TELEPHONE ENCOUNTER
Patient son Ernesto called in for Dr. Lopez in regards to patient not having a BM since Monday and its causing him discomfort in abdomin. Sx has been going since his surgery on 1/28. Pt son would like to know next steps for dad. Please advise if patient can do some OTC medication or do an e-visit to get a Rx.     Ernesto callback# [phone]

## 2025-03-21 NOTE — PROGRESS NOTES
Del Cerda   1950, 74 y.o. male   [id number]       Referring Provider: Albert Lopez DO   Referral Type: In an order in Epic    Reason for Visit: Evaluation of suspected change in hearing, tinnitus, or balance.    ADULT AUDIOLOGIC EVALUATION      Del Cerda is a 74 y.o. male seen today, 3/24/2025 , for an initial audiologic evaluation.      AUDIOLOGIC AND OTHER PERTINENT MEDICAL HISTORY:      Del Cerda reports a known hearing loss and currently wears Love Abdulkadir RICs with custom cShells + canal lock. He also has a set of Phonak [place] RICs which he received from a family member.     Patient has a history of loud noise exposure. He expressed interest in new hearing aid technology.     He denied otalgia, aural fullness, otorrhea, tinnitus, and history of head trauma    Date: 3/24/2025     IMPRESSIONS:      Today's results revealed a bilateral sensorineural hearing loss. Fair/Poor speech understanding when in quiet. Tympanometry indicates normal middle ear function.     Discussed test results and implications with patient. Discussed possible benefits of amplification.  Discussed scheduling a HAE. Hearing aids recommended at this time. Scheduled for Hearing Aid Evaluation on 4/7/25 with Milady Renee.     Follow medical recommendations of Albert Lopez DO .     ASSESSMENT AND FINDINGS:     Otoscopy unremarkable.    RIGHT EAR:  Hearing Sensitivity: Moderate sloping to Severe Sensorineural hearing loss  Speech Recognition Threshold: 65 dB HL  Word Recognition: Poor 64%, based on NU-6 25-word list at 105 dBHL with 75 dB HL masking noise using recorded speech stimuli.    Tympanometry: Normal peak pressure and compliance, Type A tympanogram, consistent with normal middle ear function.       LEFT EAR:  Hearing Sensitivity: Moderately-Severe  Sensorineural hearing loss  Speech Recognition Threshold: 60 dB HL  Word Recognition: Fair 72%, based on NU-6 25-word list

## 2025-03-24 ENCOUNTER — PROCEDURE VISIT (OUTPATIENT)
Dept: AUDIOLOGY | Age: 75
End: 2025-03-24
Payer: MEDICARE

## 2025-03-24 DIAGNOSIS — H90.3 SENSORINEURAL HEARING LOSS (SNHL) OF BOTH EARS: Primary | ICD-10-CM

## 2025-03-24 PROCEDURE — 92557 COMPREHENSIVE HEARING TEST: CPT

## 2025-03-24 PROCEDURE — 92567 TYMPANOMETRY: CPT

## 2025-04-07 ENCOUNTER — PROCEDURE VISIT (OUTPATIENT)
Dept: AUDIOLOGY | Age: 75
End: 2025-04-07

## 2025-04-07 DIAGNOSIS — H90.3 SENSORINEURAL HEARING LOSS (SNHL) OF BOTH EARS: Primary | ICD-10-CM

## 2025-04-07 NOTE — PROGRESS NOTES
Del Cerda   1950, 74 y.o. male  [id number]       HEARING AID EVALUATION    Del Cerda was seen today, 4/7/2025 for a Hearing Aid Evaluation.  Patient has prior history of hearing aid use. His son was called during today's appointment.    INSURANCE:  His insurance was contacted and he does not have a direct benefit for hearing aids, only through [employer] (please see scanned insurance verification worksheet in Media tab).      LIFESTYLE EVALUATION:       He currently wears [employer] devices with molds, fit around 4 years ago at [facility].    Underwent cochlear implant evaluation testing a little over a year ago at an outside office and noted he does not qualify.  Reviewed current audiogram and discussed CI Evaluation is not advised with his current results, however, difficulty with hearing is expected given his fair to poor word recognition scores.    He has not used assistive technology like accessories.    DEVICE SELECTION:       After a discussion of the various styles, technology levels, and features of available in hearing aid technology in relation to his lifestyle needs, Del Cerda has decided to look into third party benefit for amplification.    He was provided with pricing for hearing aids with earmolds for all technology levels; discussed value pricing options as well.    Reviewed options of remote microphone and West technology as considerations to assist with signal to noise ratio and conversation, especially in background noise.    PATIENT EDUCATION:         - Verbally reviewed various styles, technology levels, and features of available hearing aid technology and realistic expectations with amplification.    RECOMMENDATIONS:        - Contact Audiology when a decision has been made to pursue hearing aids.        No charge for today's appointment.      Milady Peña  Audiologist

## 2025-04-14 ENCOUNTER — OFFICE VISIT (OUTPATIENT)
Dept: INTERNAL MEDICINE CLINIC | Age: 75
End: 2025-04-14
Payer: MEDICARE

## 2025-04-14 VITALS
BODY MASS INDEX: 26.07 KG/M2 | WEIGHT: 176 LBS | HEART RATE: 82 BPM | SYSTOLIC BLOOD PRESSURE: 128 MMHG | TEMPERATURE: 98.1 F | HEIGHT: 69 IN | DIASTOLIC BLOOD PRESSURE: 66 MMHG | OXYGEN SATURATION: 97 %

## 2025-04-14 DIAGNOSIS — E78.5 HYPERLIPIDEMIA, UNSPECIFIED HYPERLIPIDEMIA TYPE: ICD-10-CM

## 2025-04-14 DIAGNOSIS — Z13.1 SCREENING FOR DIABETES MELLITUS: ICD-10-CM

## 2025-04-14 DIAGNOSIS — H90.3 SENSORINEURAL HEARING LOSS (SNHL) OF BOTH EARS: ICD-10-CM

## 2025-04-14 DIAGNOSIS — Z12.5 SCREENING FOR PROSTATE CANCER: ICD-10-CM

## 2025-04-14 DIAGNOSIS — Z12.11 COLON CANCER SCREENING: ICD-10-CM

## 2025-04-14 DIAGNOSIS — D12.6 ADENOMATOUS POLYP OF COLON, UNSPECIFIED PART OF COLON: ICD-10-CM

## 2025-04-14 DIAGNOSIS — R45.89 LONELINESS: ICD-10-CM

## 2025-04-14 DIAGNOSIS — Z00.00 MEDICARE ANNUAL WELLNESS VISIT, SUBSEQUENT: Primary | ICD-10-CM

## 2025-04-14 DIAGNOSIS — Z29.11 NEED FOR RSV IMMUNIZATION: ICD-10-CM

## 2025-04-14 DIAGNOSIS — Z95.2 S/P AORTIC VALVE REPLACEMENT: ICD-10-CM

## 2025-04-14 DIAGNOSIS — H90.3 BILATERAL SENSORINEURAL HEARING LOSS: ICD-10-CM

## 2025-04-14 DIAGNOSIS — Z00.00 MEDICARE ANNUAL WELLNESS VISIT, SUBSEQUENT: ICD-10-CM

## 2025-04-14 DIAGNOSIS — I10 BENIGN ESSENTIAL HTN: ICD-10-CM

## 2025-04-14 DIAGNOSIS — D68.00 VON WILLEBRAND'S DISEASE (HCC): ICD-10-CM

## 2025-04-14 DIAGNOSIS — R73.01 IMPAIRED FASTING GLUCOSE: ICD-10-CM

## 2025-04-14 DIAGNOSIS — I50.22 CHRONIC SYSTOLIC (CONGESTIVE) HEART FAILURE: ICD-10-CM

## 2025-04-14 PROCEDURE — 1123F ACP DISCUSS/DSCN MKR DOCD: CPT | Performed by: INTERNAL MEDICINE

## 2025-04-14 PROCEDURE — 3078F DIAST BP <80 MM HG: CPT | Performed by: INTERNAL MEDICINE

## 2025-04-14 PROCEDURE — G0439 PPPS, SUBSEQ VISIT: HCPCS | Performed by: INTERNAL MEDICINE

## 2025-04-14 PROCEDURE — 1159F MED LIST DOCD IN RCRD: CPT | Performed by: INTERNAL MEDICINE

## 2025-04-14 PROCEDURE — 3074F SYST BP LT 130 MM HG: CPT | Performed by: INTERNAL MEDICINE

## 2025-04-14 RX ORDER — RESPIRATORY SYNCYTIAL VISUS VACCINE RECOMBINANT, ADJUVANTED 120MCG/0.5
0.5 KIT INTRAMUSCULAR ONCE
Qty: 0.5 ML | Refills: 0 | Status: SHIPPED | OUTPATIENT
Start: 2025-04-14 | End: 2025-04-14

## 2025-04-14 ASSESSMENT — PATIENT HEALTH QUESTIONNAIRE - PHQ9
7. TROUBLE CONCENTRATING ON THINGS, SUCH AS READING THE NEWSPAPER OR WATCHING TELEVISION: NOT AT ALL
9. THOUGHTS THAT YOU WOULD BE BETTER OFF DEAD, OR OF HURTING YOURSELF: NOT AT ALL
10. IF YOU CHECKED OFF ANY PROBLEMS, HOW DIFFICULT HAVE THESE PROBLEMS MADE IT FOR YOU TO DO YOUR WORK, TAKE CARE OF THINGS AT HOME, OR GET ALONG WITH OTHER PEOPLE: NOT DIFFICULT AT ALL
8. MOVING OR SPEAKING SO SLOWLY THAT OTHER PEOPLE COULD HAVE NOTICED. OR THE OPPOSITE, BEING SO FIGETY OR RESTLESS THAT YOU HAVE BEEN MOVING AROUND A LOT MORE THAN USUAL: NOT AT ALL
6. FEELING BAD ABOUT YOURSELF - OR THAT YOU ARE A FAILURE OR HAVE LET YOURSELF OR YOUR FAMILY DOWN: SEVERAL DAYS
3. TROUBLE FALLING OR STAYING ASLEEP: NOT AT ALL
SUM OF ALL RESPONSES TO PHQ QUESTIONS 1-9: 2
SUM OF ALL RESPONSES TO PHQ QUESTIONS 1-9: 2
5. POOR APPETITE OR OVEREATING: NOT AT ALL
2. FEELING DOWN, DEPRESSED OR HOPELESS: SEVERAL DAYS
SUM OF ALL RESPONSES TO PHQ QUESTIONS 1-9: 2
4. FEELING TIRED OR HAVING LITTLE ENERGY: NOT AT ALL
1. LITTLE INTEREST OR PLEASURE IN DOING THINGS: NOT AT ALL
SUM OF ALL RESPONSES TO PHQ QUESTIONS 1-9: 2

## 2025-04-14 NOTE — PROGRESS NOTES
[facility] Phys[facility]  Internal Medicine  Patient Encounter  Albert Lopez D.O., [facility]       Medicare Annual Wellness Visit  Del Cerda  4/14/2025    Subjective Del Cerda is here for Medicare AWV      Medical/Surgical Histories     Past Medical History:   Diagnosis Date    Acute ischemic stroke (HCC) 10/24/2014    Right    Anemia     Aortic insufficiency     Arthritis     rt knee    Bilateral sensorineural hearing loss 05/09/2019    CAD (coronary artery disease) 05/29/2014    LAD, RCA, Diag - cariothoracic surgery    Cataracts, bilateral 07/2020    Chronic systolic (congestive) heart failure 2/15/2023    Colon polyps 02/03/2020    Tubular adenoma, Dr. Almeida    Depression     [place] (hard of hearing)     hermes-lateral hearing aids    HTN (hypertension)     Hyperlipidemia     Hypokalemia     Jaw dislocation     Von Willebrand disease (HCC)     Walking pneumonia           Past Surgical History:   Procedure Laterality Date    AORTIC VALVE REPLACEMENT  05/19/2023    COLONOSCOPY  02/03/2020    Dr. Almeida    COLONOSCOPY N/A 02/03/2020    COLONOSCOPY POLYPECTOMY SNARE/COLD BIOPSY performed by Tre Almeida MD at [facility] ENDOSCOPY    CORONARY ARTERY BYPASS GRAFT  2014 6/2/2014 by Dr. August Freeman    HERNIA REPAIR Right 06/10/2022    RIGHT INGUINAL HERNIA REPAIR WITH MESH performed by Skip Huston MD at [facility] OR    INTRACAPSULAR CATARACT EXTRACTION Right 07/31/2020    Phacoemulsification with intraocular lens implant performed by Med Ro MD at [facility]    INTRACAPSULAR CATARACT EXTRACTION Left 08/06/2020    Phacoemulsification with intraocular lens implant performed by Med Ro MD at [facility]    JOINT REPLACEMENT Right 10/21/2014    right total knee replacement    TONSILLECTOMY AND ADENOIDECTOMY  1955    TOTAL KNEE ARTHROPLASTY Left 01/28/2025    Dr. Mason           Medications/Allergies     Current Outpatient Medications   Medication Sig Dispense

## 2025-04-14 NOTE — PATIENT INSTRUCTIONS

## 2025-04-15 ENCOUNTER — RESULTS FOLLOW-UP (OUTPATIENT)
Dept: INTERNAL MEDICINE CLINIC | Age: 75
End: 2025-04-15

## 2025-04-15 LAB
ALBUMIN SERPL-MCNC: 4.2 G/DL (ref 3.4–5)
ALBUMIN/GLOB SERPL: 1.7 {RATIO} (ref 1.1–2.2)
ALP SERPL-CCNC: 95 U/L (ref 40–129)
ALT SERPL-CCNC: 15 U/L (ref 10–40)
ANION GAP SERPL CALCULATED.3IONS-SCNC: 9 MMOL/L (ref 3–16)
AST SERPL-CCNC: 17 U/L (ref 15–37)
BASOPHILS # BLD: 0.1 K/UL (ref 0–0.2)
BASOPHILS NFR BLD: 1 %
BILIRUB SERPL-MCNC: 0.5 MG/DL (ref 0–1)
BUN SERPL-MCNC: 8 MG/DL (ref 7–20)
CALCIUM SERPL-MCNC: 9.6 MG/DL (ref 8.3–10.6)
CHLORIDE SERPL-SCNC: 106 MMOL/L (ref 99–110)
CHOLEST SERPL-MCNC: 119 MG/DL (ref 0–199)
CO2 SERPL-SCNC: 27 MMOL/L (ref 21–32)
CREAT SERPL-MCNC: 0.7 MG/DL (ref 0.8–1.3)
DEPRECATED RDW RBC AUTO: 14.4 % (ref 12.4–15.4)
EOSINOPHIL # BLD: 0.1 K/UL (ref 0–0.6)
EOSINOPHIL NFR BLD: 1 %
EST. AVERAGE GLUCOSE BLD GHB EST-MCNC: 93.9 MG/DL
GFR SERPLBLD CREATININE-BSD FMLA CKD-EPI: >90 ML/MIN/{1.73_M2}
GLUCOSE SERPL-MCNC: 103 MG/DL (ref 70–99)
HBA1C MFR BLD: 4.9 %
HCT VFR BLD AUTO: 41.2 % (ref 40.5–52.5)
HDLC SERPL-MCNC: 51 MG/DL (ref 40–60)
HGB BLD-MCNC: 14 G/DL (ref 13.5–17.5)
LDLC SERPL CALC-MCNC: 57 MG/DL
LYMPHOCYTES # BLD: 2 K/UL (ref 1–5.1)
LYMPHOCYTES NFR BLD: 26 %
MCH RBC QN AUTO: 32.8 PG (ref 26–34)
MCHC RBC AUTO-ENTMCNC: 34 G/DL (ref 31–36)
MCV RBC AUTO: 96.4 FL (ref 80–100)
MONOCYTES # BLD: 0.6 K/UL (ref 0–1.3)
MONOCYTES NFR BLD: 8 %
NEUTROPHILS # BLD: 4.9 K/UL (ref 1.7–7.7)
NEUTROPHILS NFR BLD: 59 %
NEUTS BAND NFR BLD MANUAL: 5 % (ref 0–7)
PLATELET # BLD AUTO: 250 K/UL (ref 135–450)
PMV BLD AUTO: 7.9 FL (ref 5–10.5)
POTASSIUM SERPL-SCNC: 4.6 MMOL/L (ref 3.5–5.1)
PROT SERPL-MCNC: 6.7 G/DL (ref 6.4–8.2)
PSA SERPL DL<=0.01 NG/ML-MCNC: 1.76 NG/ML (ref 0–4)
RBC # BLD AUTO: 4.27 M/UL (ref 4.2–5.9)
RBC MORPH BLD: NORMAL
SODIUM SERPL-SCNC: 142 MMOL/L (ref 136–145)
TRIGL SERPL-MCNC: 54 MG/DL (ref 0–150)
TSH SERPL DL<=0.005 MIU/L-ACNC: 1.33 UIU/ML (ref 0.27–4.2)
VLDLC SERPL CALC-MCNC: 11 MG/DL
WBC # BLD AUTO: 7.6 K/UL (ref 4–11)

## 2025-08-18 DIAGNOSIS — E78.5 HYPERLIPIDEMIA, UNSPECIFIED HYPERLIPIDEMIA TYPE: ICD-10-CM

## 2025-08-18 RX ORDER — METOPROLOL SUCCINATE 50 MG/1
50 TABLET, EXTENDED RELEASE ORAL DAILY
Qty: 90 TABLET | Refills: 1 | Status: SHIPPED | OUTPATIENT
Start: 2025-08-18

## 2025-08-18 RX ORDER — ATORVASTATIN CALCIUM 80 MG/1
80 TABLET, FILM COATED ORAL DAILY
Qty: 90 TABLET | Refills: 1 | Status: SHIPPED | OUTPATIENT
Start: 2025-08-18

## (undated) DEVICE — SYRINGE MED 30ML STD CLR PLAS LUERLOCK TIP N CTRL DISP

## (undated) DEVICE — SUTURE PERMA-HAND SZ 2-0 L30IN NONABSORBABLE BLK L26MM SH K833H

## (undated) DEVICE — SOLUTION IRRIG BSS ST 500ML

## (undated) DEVICE — SOLUTION IV IRRIG WATER 500ML POUR BRL ST 2F7113

## (undated) DEVICE — GLOVE,SURG,TRIUMPH MICRO,LTX,PF,7.5: Brand: MEDLINE

## (undated) DEVICE — SYRINGE TB 1ML NDL 25GA L0.625IN PLAS SLIP TIP CONVENTIONAL

## (undated) DEVICE — SUTURE PDS + SZ 2 0 L27IN ABSRB VLT L26MM CT 2 1 2 CIR PDP333H

## (undated) DEVICE — SURGICAL PROC PACK SHT WEST V4

## (undated) DEVICE — CLEANER,CAUTERY TIP,2X2",STERILE: Brand: MEDLINE

## (undated) DEVICE — PENROSE DRAIN 18 X .5" SILICONE: Brand: MEDLINE

## (undated) DEVICE — Device

## (undated) DEVICE — SUTURE VCRL + SZ 3-0 L27IN ABSRB UD L26MM SH 1/2 CIR VCP416H

## (undated) DEVICE — SNARE ENDOSCP L240CM OD24MM LOOP W10MM RND INSUL STIFF BRAID

## (undated) DEVICE — ADHESIVE SKIN CLOSURE TOP 36 CC HI VISC DERMBND MINI

## (undated) DEVICE — TRAP POLYP ETRAP

## (undated) DEVICE — MINOR SET UP: Brand: MEDLINE INDUSTRIES, INC.

## (undated) DEVICE — CANISTER, RIGID, 1200CC: Brand: MEDLINE INDUSTRIES, INC.

## (undated) DEVICE — ENDOSCOPY KIT: Brand: MEDLINE INDUSTRIES, INC.

## (undated) DEVICE — SUTURE VCRL + SZ 0 L27IN ABSRB UD CT-1 L36MM 1/2 CIR TAPR VCP260H

## (undated) DEVICE — SUTURE VCRL + SZ 4-0 L18IN ABSRB UD L19MM PS-2 3/8 CIR PRIM VCP496H

## (undated) DEVICE — Device: Brand: MEDEX

## (undated) DEVICE — GOWN SIRUS NONREIN LG W/TWL: Brand: MEDLINE INDUSTRIES, INC.

## (undated) DEVICE — DRAPE,MINOR PROC,6X6 FEN, STER: Brand: MEDLINE

## (undated) DEVICE — FORCEPS BX 240CM 2.4MM L NDL RAD JAW 4 M00513334

## (undated) DEVICE — SOLUTION IV IRRIG POUR BRL 0.9% SODIUM CHL 2F7124

## (undated) DEVICE — CONTAINER SPEC 480ML CLR POLYSTYR 10% NEUT BUFF FRMLN ZN

## (undated) DEVICE — TUBING, SUCTION, 1/4" X 12', STRAIGHT: Brand: MEDLINE

## (undated) DEVICE — GLOVE ORANGE PI 7   MSG9070

## (undated) DEVICE — GLOVE ORANGE PI 7 1/2   MSG9075

## (undated) DEVICE — SYRINGE MED 3ML CLR PLAS STD N CTRL LUERLOCK TIP DISP